# Patient Record
Sex: MALE | Race: WHITE | NOT HISPANIC OR LATINO | Employment: OTHER | URBAN - METROPOLITAN AREA
[De-identification: names, ages, dates, MRNs, and addresses within clinical notes are randomized per-mention and may not be internally consistent; named-entity substitution may affect disease eponyms.]

---

## 2021-08-09 ENCOUNTER — HOSPITAL ENCOUNTER (EMERGENCY)
Facility: HOSPITAL | Age: 61
Discharge: HOME | End: 2021-08-09
Attending: EMERGENCY MEDICINE | Admitting: EMERGENCY MEDICINE

## 2021-08-09 ENCOUNTER — APPOINTMENT (OUTPATIENT)
Dept: GENERAL RADIOLOGY | Facility: HOSPITAL | Age: 61
End: 2021-08-09

## 2021-08-09 VITALS
OXYGEN SATURATION: 95 % | TEMPERATURE: 97.7 F | WEIGHT: 164.9 LBS | SYSTOLIC BLOOD PRESSURE: 104 MMHG | HEIGHT: 69 IN | RESPIRATION RATE: 20 BRPM | BODY MASS INDEX: 24.42 KG/M2 | DIASTOLIC BLOOD PRESSURE: 77 MMHG | HEART RATE: 93 BPM

## 2021-08-09 DIAGNOSIS — E83.42 HYPOMAGNESEMIA: ICD-10-CM

## 2021-08-09 DIAGNOSIS — K70.10 ALCOHOLIC HEPATITIS WITHOUT ASCITES: Primary | ICD-10-CM

## 2021-08-09 LAB
ALBUMIN SERPL-MCNC: 3.9 G/DL (ref 3.5–5.2)
ALBUMIN/GLOB SERPL: 1.7 G/DL
ALP SERPL-CCNC: 182 U/L (ref 39–117)
ALT SERPL W P-5'-P-CCNC: 84 U/L (ref 1–41)
ANION GAP SERPL CALCULATED.3IONS-SCNC: 18.5 MMOL/L (ref 5–15)
AST SERPL-CCNC: 299 U/L (ref 1–40)
BILIRUB SERPL-MCNC: 1.4 MG/DL (ref 0–1.2)
BUN SERPL-MCNC: 13 MG/DL (ref 8–23)
BUN/CREAT SERPL: 26.5 (ref 7–25)
CALCIUM SPEC-SCNC: 8 MG/DL (ref 8.6–10.5)
CHLORIDE SERPL-SCNC: 96 MMOL/L (ref 98–107)
CK MB SERPL-CCNC: <1 NG/ML
CK SERPL-CCNC: 31 U/L (ref 20–200)
CO2 SERPL-SCNC: 20.5 MMOL/L (ref 22–29)
CREAT SERPL-MCNC: 0.49 MG/DL (ref 0.76–1.27)
DEPRECATED RDW RBC AUTO: 42.5 FL (ref 37–54)
ERYTHROCYTE [DISTWIDTH] IN BLOOD BY AUTOMATED COUNT: 12.9 % (ref 12.3–15.4)
GFR SERPL CREATININE-BSD FRML MDRD: >150 ML/MIN/1.73
GLOBULIN UR ELPH-MCNC: 2.3 GM/DL
GLUCOSE SERPL-MCNC: 95 MG/DL (ref 65–99)
HCT VFR BLD AUTO: 35.3 % (ref 37.5–51)
HGB BLD-MCNC: 13.2 G/DL (ref 13–17.7)
HOLD SPECIMEN: NORMAL
LARGE PLATELETS: NORMAL
LIPASE SERPL-CCNC: 88 U/L (ref 13–60)
MAGNESIUM SERPL-MCNC: 1.2 MG/DL (ref 1.6–2.4)
MCH RBC QN AUTO: 33.7 PG (ref 26.6–33)
MCHC RBC AUTO-ENTMCNC: 37.4 G/DL (ref 31.5–35.7)
MCV RBC AUTO: 90.1 FL (ref 79–97)
NRBC BLD AUTO-RTO: 0 /100 WBC (ref 0–0.2)
NT-PROBNP SERPL-MCNC: 51.1 PG/ML (ref 0–900)
PLATELET # BLD AUTO: 68 10*3/MM3 (ref 140–450)
PMV BLD AUTO: 11.7 FL (ref 6–12)
POTASSIUM SERPL-SCNC: 3.4 MMOL/L (ref 3.5–5.2)
PROT SERPL-MCNC: 6.2 G/DL (ref 6–8.5)
RBC # BLD AUTO: 3.92 10*6/MM3 (ref 4.14–5.8)
RBC MORPH BLD: NORMAL
SMALL PLATELETS BLD QL SMEAR: NORMAL
SODIUM SERPL-SCNC: 135 MMOL/L (ref 136–145)
TROPONIN I SERPL-MCNC: 0.01 NG/ML (ref 0–0.6)
TROPONIN I SERPL-MCNC: 0.02 NG/ML (ref 0–0.6)
WBC # BLD AUTO: 5.23 10*3/MM3 (ref 3.4–10.8)
WBC MORPH BLD: NORMAL
WHOLE BLOOD HOLD SPECIMEN: NORMAL

## 2021-08-09 PROCEDURE — 82550 ASSAY OF CK (CPK): CPT | Performed by: EMERGENCY MEDICINE

## 2021-08-09 PROCEDURE — 93005 ELECTROCARDIOGRAM TRACING: CPT | Performed by: EMERGENCY MEDICINE

## 2021-08-09 PROCEDURE — 82553 CREATINE MB FRACTION: CPT | Performed by: EMERGENCY MEDICINE

## 2021-08-09 PROCEDURE — 96375 TX/PRO/DX INJ NEW DRUG ADDON: CPT

## 2021-08-09 PROCEDURE — 83880 ASSAY OF NATRIURETIC PEPTIDE: CPT | Performed by: EMERGENCY MEDICINE

## 2021-08-09 PROCEDURE — 25010000002 MAGNESIUM SULFATE IN D5W 1G/100ML (PREMIX) 1-5 GM/100ML-% SOLUTION: Performed by: EMERGENCY MEDICINE

## 2021-08-09 PROCEDURE — 99284 EMERGENCY DEPT VISIT MOD MDM: CPT

## 2021-08-09 PROCEDURE — 93005 ELECTROCARDIOGRAM TRACING: CPT

## 2021-08-09 PROCEDURE — 96366 THER/PROPH/DIAG IV INF ADDON: CPT

## 2021-08-09 PROCEDURE — 84484 ASSAY OF TROPONIN QUANT: CPT

## 2021-08-09 PROCEDURE — 85025 COMPLETE CBC W/AUTO DIFF WBC: CPT | Performed by: EMERGENCY MEDICINE

## 2021-08-09 PROCEDURE — 83690 ASSAY OF LIPASE: CPT | Performed by: EMERGENCY MEDICINE

## 2021-08-09 PROCEDURE — 80053 COMPREHEN METABOLIC PANEL: CPT | Performed by: EMERGENCY MEDICINE

## 2021-08-09 PROCEDURE — 85007 BL SMEAR W/DIFF WBC COUNT: CPT | Performed by: EMERGENCY MEDICINE

## 2021-08-09 PROCEDURE — 83735 ASSAY OF MAGNESIUM: CPT | Performed by: EMERGENCY MEDICINE

## 2021-08-09 PROCEDURE — 71045 X-RAY EXAM CHEST 1 VIEW: CPT

## 2021-08-09 PROCEDURE — 96365 THER/PROPH/DIAG IV INF INIT: CPT

## 2021-08-09 RX ORDER — PANTOPRAZOLE SODIUM 40 MG/10ML
40 INJECTION, POWDER, LYOPHILIZED, FOR SOLUTION INTRAVENOUS ONCE
Status: COMPLETED | OUTPATIENT
Start: 2021-08-09 | End: 2021-08-09

## 2021-08-09 RX ORDER — MAGNESIUM OXIDE 400 MG/1
400 TABLET ORAL DAILY
Qty: 30 TABLET | Refills: 0 | Status: SHIPPED | OUTPATIENT
Start: 2021-08-09

## 2021-08-09 RX ORDER — MAGNESIUM SULFATE 1 G/100ML
INJECTION INTRAVENOUS
Status: DISCONTINUED
Start: 2021-08-09 | End: 2021-08-09 | Stop reason: HOSPADM

## 2021-08-09 RX ORDER — SODIUM CHLORIDE 0.9 % (FLUSH) 0.9 %
10 SYRINGE (ML) INJECTION AS NEEDED
Status: DISCONTINUED | OUTPATIENT
Start: 2021-08-09 | End: 2021-08-09 | Stop reason: HOSPADM

## 2021-08-09 RX ORDER — MULTIVITAMIN
1 CAPSULE ORAL DAILY
Qty: 30 CAPSULE | Refills: 0 | Status: SHIPPED | OUTPATIENT
Start: 2021-08-09 | End: 2022-08-09

## 2021-08-09 RX ORDER — POTASSIUM CHLORIDE 750 MG/1
40 CAPSULE, EXTENDED RELEASE ORAL ONCE
Status: COMPLETED | OUTPATIENT
Start: 2021-08-09 | End: 2021-08-09

## 2021-08-09 RX ORDER — MAGNESIUM SULFATE 1 G/100ML
1 INJECTION INTRAVENOUS
Status: COMPLETED | OUTPATIENT
Start: 2021-08-09 | End: 2021-08-09

## 2021-08-09 RX ORDER — ASPIRIN 81 MG/1
324 TABLET, CHEWABLE ORAL ONCE
Status: DISCONTINUED | OUTPATIENT
Start: 2021-08-09 | End: 2021-08-09 | Stop reason: HOSPADM

## 2021-08-09 RX ADMIN — POTASSIUM CHLORIDE 40 MEQ: 10 CAPSULE, COATED, EXTENDED RELEASE ORAL at 10:41

## 2021-08-09 RX ADMIN — MAGNESIUM SULFATE HEPTAHYDRATE 1 G: 1 INJECTION, SOLUTION INTRAVENOUS at 13:45

## 2021-08-09 RX ADMIN — MAGNESIUM SULFATE HEPTAHYDRATE 1 G: 1 INJECTION, SOLUTION INTRAVENOUS at 10:45

## 2021-08-09 RX ADMIN — PANTOPRAZOLE SODIUM 40 MG: 40 INJECTION, POWDER, FOR SOLUTION INTRAVENOUS at 10:43

## 2021-08-09 NOTE — ED PROVIDER NOTES
Subjective   Pain.  Chest.  Retrosternal.  Aching.  Mild.  Woke up feeling that way this morning.  Has had these issues on and off over the past several months.  Nothing seems to make better or worse.  Denies fevers or chills.  Denies cough.  Denies vomiting or diarrhea.  Denies abdominal pain.  Denies alcohol consumption today.          Review of Systems   All other systems reviewed and are negative.      History reviewed. No pertinent past medical history.    No Known Allergies    Past Surgical History:   Procedure Laterality Date   • ABDOMINAL SURGERY         History reviewed. No pertinent family history.    Social History     Socioeconomic History   • Marital status:      Spouse name: Not on file   • Number of children: Not on file   • Years of education: Not on file   • Highest education level: Not on file   Tobacco Use   • Smoking status: Current Every Day Smoker     Packs/day: 1.00   Vaping Use   • Vaping Use: Never used   Substance and Sexual Activity   • Alcohol use: Yes   • Drug use: Yes     Types: Marijuana   • Sexual activity: Defer           Objective   Physical Exam  Vitals and nursing note reviewed.   Constitutional:       Comments: Chronically ill-appearing   HENT:      Head: Normocephalic and atraumatic.   Eyes:      Comments: No scleral icterus   Neck:      Vascular: No JVD.   Cardiovascular:      Rate and Rhythm: Normal rate and regular rhythm.      Pulses:           Radial pulses are 2+ on the right side and 2+ on the left side.   Pulmonary:      Effort: Pulmonary effort is normal.      Breath sounds: Normal breath sounds.   Chest:      Chest wall: No tenderness.   Abdominal:      Palpations: Abdomen is soft.      Tenderness: There is abdominal tenderness.   Musculoskeletal:      Right lower leg: No tenderness.      Left lower leg: No tenderness.   Skin:     General: Skin is warm and dry.   Neurological:      Mental Status: He is alert and oriented to person, place, and time.          Procedures           ED Course                                           MDM    61-year-old male presents for evaluation of chest discomfort.  He is fairly vague and nondescript about his symptoms.  His work-up indicates a mild transaminitis with an AST predominance.  Given his overall appearance I would suspect a history of alcoholism though he is not forthcoming about this when asked.  For unknown reasons the patient removed his catheter from his left forearm resulting in some minor bleeding and a bandage was placed by myself on this area.  Will obtain screening work-up for cardiopulmonary causes of his chest discomfort however I suspect alcoholic hepatitis as a likely issue for him.  The patient is also hypokalemic and hypomagnesemic.  Replacement has been ordered in the emergency department.  Additionally the differential diagnosis includes acute coronary syndrome, pulmonary embolism, pancreatitis, and pneumonia among others.      EKG interpretation: Interpreted by myself.  Sinus tachycardia.  Rate 103.  Normal P waves and NV interval.  Normal QRS and axis.  Normal QTC.  ST segments are nonspecific.  No significant changes from old EKG.  Final diagnoses:   Alcoholic hepatitis without ascites   Hypomagnesemia       ED Disposition  ED Disposition     ED Disposition Condition Comment    Discharge Stable           [facility] EMERGENCY ROOM  [address] [place] [address] [address]  [facility] [id number] [phone]    As needed, If symptoms worsen    Sudhir Webber MD  [address] [facility] [id number]  [phone]    Call today  for hepatitis evaluation         Medication List      New Prescriptions    magnesium oxide 400 MG tablet  Commonly known as: MAG-OX  Take 1 tablet by mouth Daily.     multivitamin capsule  Take 1 capsule by mouth Daily.           Where to Get Your Medications      These medications were sent to [facility] - [address] [place] [address]     Hours: Mon-Fri 9:00AM-7:30PM Saturday 9:00AM-2:00PM Phone: [phone]   · magnesium oxide 400 MG tablet  · multivitamin capsule          Conrado Mazariegos DO  08/09/21 1442

## 2021-08-10 LAB — QT INTERVAL: 340 MS

## 2021-09-04 ENCOUNTER — APPOINTMENT (OUTPATIENT)
Dept: GENERAL RADIOLOGY | Facility: HOSPITAL | Age: 61
End: 2021-09-04

## 2021-09-04 ENCOUNTER — HOSPITAL ENCOUNTER (INPATIENT)
Facility: HOSPITAL | Age: 61
LOS: 11 days | Discharge: HOME | End: 2021-09-16
Attending: EMERGENCY MEDICINE | Admitting: INTERNAL MEDICINE

## 2021-09-04 DIAGNOSIS — E83.42 HYPOMAGNESEMIA: ICD-10-CM

## 2021-09-04 DIAGNOSIS — F10.29 ALCOHOL DEPENDENCE WITH UNSPECIFIED ALCOHOL-INDUCED DISORDER (HCC): ICD-10-CM

## 2021-09-04 DIAGNOSIS — J18.9 PNEUMONIA DUE TO INFECTIOUS ORGANISM, UNSPECIFIED LATERALITY, UNSPECIFIED PART OF LUNG: ICD-10-CM

## 2021-09-04 DIAGNOSIS — J96.01 ACUTE RESPIRATORY FAILURE WITH HYPOXIA (HCC): ICD-10-CM

## 2021-09-04 DIAGNOSIS — Z78.9 DECREASED ACTIVITIES OF DAILY LIVING (ADL): ICD-10-CM

## 2021-09-04 DIAGNOSIS — R26.2 DIFFICULTY IN WALKING: ICD-10-CM

## 2021-09-04 DIAGNOSIS — Z20.822 SUSPECTED COVID-19 VIRUS INFECTION: Primary | ICD-10-CM

## 2021-09-04 LAB
ALBUMIN SERPL-MCNC: 3.2 G/DL (ref 3.5–5.2)
ALBUMIN/GLOB SERPL: 1.2 G/DL
ALP SERPL-CCNC: 197 U/L (ref 39–117)
ALT SERPL W P-5'-P-CCNC: 51 U/L (ref 1–41)
ANION GAP SERPL CALCULATED.3IONS-SCNC: 16.2 MMOL/L (ref 5–15)
AST SERPL-CCNC: 180 U/L (ref 1–40)
BILIRUB SERPL-MCNC: 1.5 MG/DL (ref 0–1.2)
BUN SERPL-MCNC: 29 MG/DL (ref 8–23)
BUN/CREAT SERPL: 30.9 (ref 7–25)
CALCIUM SPEC-SCNC: 8.1 MG/DL (ref 8.6–10.5)
CHLORIDE SERPL-SCNC: 92 MMOL/L (ref 98–107)
CO2 SERPL-SCNC: 22.8 MMOL/L (ref 22–29)
CREAT SERPL-MCNC: 0.94 MG/DL (ref 0.76–1.27)
D-LACTATE SERPL-SCNC: 3.6 MMOL/L (ref 0.5–2)
DEPRECATED RDW RBC AUTO: 48.2 FL (ref 37–54)
ERYTHROCYTE [DISTWIDTH] IN BLOOD BY AUTOMATED COUNT: 14.3 % (ref 12.3–15.4)
ETHANOL BLD-MCNC: 30 MG/DL (ref 0–10)
ETHANOL UR QL: 0.03 %
GFR SERPL CREATININE-BSD FRML MDRD: 82 ML/MIN/1.73
GLOBULIN UR ELPH-MCNC: 2.7 GM/DL
GLUCOSE SERPL-MCNC: 147 MG/DL (ref 65–99)
HCT VFR BLD AUTO: 32.6 % (ref 37.5–51)
HGB BLD-MCNC: 11.9 G/DL (ref 13–17.7)
HOLD SPECIMEN: NORMAL
HOLD SPECIMEN: NORMAL
LYMPHOCYTES # BLD MANUAL: 0.44 10*3/MM3 (ref 0.7–3.1)
LYMPHOCYTES NFR BLD MANUAL: 10 % (ref 5–12)
LYMPHOCYTES NFR BLD MANUAL: 5 % (ref 19.6–45.3)
MAGNESIUM SERPL-MCNC: 0.7 MG/DL (ref 1.6–2.4)
MCH RBC QN AUTO: 33.7 PG (ref 26.6–33)
MCHC RBC AUTO-ENTMCNC: 36.5 G/DL (ref 31.5–35.7)
MCV RBC AUTO: 92.4 FL (ref 79–97)
MONOCYTES # BLD AUTO: 0.89 10*3/MM3 (ref 0.1–0.9)
NEUTROPHILS # BLD AUTO: 7.55 10*3/MM3 (ref 1.7–7)
NEUTROPHILS NFR BLD MANUAL: 77 % (ref 42.7–76)
NEUTS BAND NFR BLD MANUAL: 8 % (ref 0–5)
NRBC SPEC MANUAL: 1 /100 WBC (ref 0–0.2)
PLATELET # BLD AUTO: 68 10*3/MM3 (ref 140–450)
PMV BLD AUTO: 13.4 FL (ref 6–12)
POTASSIUM SERPL-SCNC: 3 MMOL/L (ref 3.5–5.2)
PROT SERPL-MCNC: 5.9 G/DL (ref 6–8.5)
RBC # BLD AUTO: 3.53 10*6/MM3 (ref 4.14–5.8)
RBC MORPH BLD: NORMAL
SCAN SLIDE: NORMAL
SMALL PLATELETS BLD QL SMEAR: ABNORMAL
SODIUM SERPL-SCNC: 131 MMOL/L (ref 136–145)
TROPONIN T SERPL-MCNC: <0.01 NG/ML (ref 0–0.03)
WBC # BLD AUTO: 8.88 10*3/MM3 (ref 3.4–10.8)
WBC MORPH BLD: NORMAL
WHOLE BLOOD HOLD SPECIMEN: NORMAL
WHOLE BLOOD HOLD SPECIMEN: NORMAL

## 2021-09-04 PROCEDURE — U0003 INFECTIOUS AGENT DETECTION BY NUCLEIC ACID (DNA OR RNA); SEVERE ACUTE RESPIRATORY SYNDROME CORONAVIRUS 2 (SARS-COV-2) (CORONAVIRUS DISEASE [COVID-19]), AMPLIFIED PROBE TECHNIQUE, MAKING USE OF HIGH THROUGHPUT TECHNOLOGIES AS DESCRIBED BY CMS-2020-01-R: HCPCS | Performed by: EMERGENCY MEDICINE

## 2021-09-04 PROCEDURE — 80053 COMPREHEN METABOLIC PANEL: CPT

## 2021-09-04 PROCEDURE — 87040 BLOOD CULTURE FOR BACTERIA: CPT

## 2021-09-04 PROCEDURE — 82077 ASSAY SPEC XCP UR&BREATH IA: CPT | Performed by: NURSE PRACTITIONER

## 2021-09-04 PROCEDURE — 93005 ELECTROCARDIOGRAM TRACING: CPT

## 2021-09-04 PROCEDURE — 99291 CRITICAL CARE FIRST HOUR: CPT

## 2021-09-04 PROCEDURE — 84484 ASSAY OF TROPONIN QUANT: CPT

## 2021-09-04 PROCEDURE — 85007 BL SMEAR W/DIFF WBC COUNT: CPT

## 2021-09-04 PROCEDURE — 85025 COMPLETE CBC W/AUTO DIFF WBC: CPT

## 2021-09-04 PROCEDURE — 93005 ELECTROCARDIOGRAM TRACING: CPT | Performed by: EMERGENCY MEDICINE

## 2021-09-04 PROCEDURE — 93010 ELECTROCARDIOGRAM REPORT: CPT | Performed by: INTERNAL MEDICINE

## 2021-09-04 PROCEDURE — 25010000002 MAGNESIUM SULFATE IN D5W 1G/100ML (PREMIX) 1-5 GM/100ML-% SOLUTION: Performed by: EMERGENCY MEDICINE

## 2021-09-04 PROCEDURE — 83735 ASSAY OF MAGNESIUM: CPT

## 2021-09-04 PROCEDURE — 83605 ASSAY OF LACTIC ACID: CPT | Performed by: EMERGENCY MEDICINE

## 2021-09-04 PROCEDURE — 71045 X-RAY EXAM CHEST 1 VIEW: CPT

## 2021-09-04 RX ORDER — MAGNESIUM SULFATE 1 G/100ML
1 INJECTION INTRAVENOUS
Status: COMPLETED | OUTPATIENT
Start: 2021-09-04 | End: 2021-09-05

## 2021-09-04 RX ORDER — SODIUM CHLORIDE 0.9 % (FLUSH) 0.9 %
10 SYRINGE (ML) INJECTION AS NEEDED
Status: DISCONTINUED | OUTPATIENT
Start: 2021-09-04 | End: 2021-09-16 | Stop reason: HOSPADM

## 2021-09-04 RX ADMIN — MAGNESIUM SULFATE 1 G: 1 INJECTION INTRAVENOUS at 23:20

## 2021-09-04 RX ADMIN — SODIUM CHLORIDE, POTASSIUM CHLORIDE, SODIUM LACTATE AND CALCIUM CHLORIDE 2253 ML: 600; 310; 30; 20 INJECTION, SOLUTION INTRAVENOUS at 23:48

## 2021-09-05 ENCOUNTER — APPOINTMENT (OUTPATIENT)
Dept: GENERAL RADIOLOGY | Facility: HOSPITAL | Age: 61
End: 2021-09-05

## 2021-09-05 ENCOUNTER — APPOINTMENT (OUTPATIENT)
Dept: CT IMAGING | Facility: HOSPITAL | Age: 61
End: 2021-09-05

## 2021-09-05 PROBLEM — Z20.822 SUSPECTED COVID-19 VIRUS INFECTION: Status: ACTIVE | Noted: 2021-09-05

## 2021-09-05 LAB
ALBUMIN SERPL-MCNC: 2.9 G/DL (ref 3.5–5.2)
ALBUMIN/GLOB SERPL: 0.9 G/DL
ALP SERPL-CCNC: 210 U/L (ref 39–117)
ALT SERPL W P-5'-P-CCNC: 51 U/L (ref 1–41)
ANION GAP SERPL CALCULATED.3IONS-SCNC: 10 MMOL/L (ref 5–15)
ARTERIAL PATENCY WRIST A: POSITIVE
AST SERPL-CCNC: 173 U/L (ref 1–40)
BASE EXCESS BLDA CALC-SCNC: -1.3 MMOL/L (ref -2–2)
BASOPHILS # BLD MANUAL: 0.13 10*3/MM3 (ref 0–0.2)
BASOPHILS NFR BLD AUTO: 1 % (ref 0–1.5)
BDY SITE: ABNORMAL
BILIRUB SERPL-MCNC: 1.8 MG/DL (ref 0–1.2)
BUN SERPL-MCNC: 25 MG/DL (ref 8–23)
BUN/CREAT SERPL: 28.7 (ref 7–25)
CALCIUM SPEC-SCNC: 8.3 MG/DL (ref 8.6–10.5)
CHLORIDE SERPL-SCNC: 100 MMOL/L (ref 98–107)
CO2 SERPL-SCNC: 14 MMOL/L (ref 22–29)
COHGB MFR BLD: 0.4 % (ref 0–1.5)
CREAT SERPL-MCNC: 0.87 MG/DL (ref 0.76–1.27)
D-LACTATE SERPL-SCNC: 2 MMOL/L (ref 0.5–2)
DEPRECATED RDW RBC AUTO: 51.5 FL (ref 37–54)
ERYTHROCYTE [DISTWIDTH] IN BLOOD BY AUTOMATED COUNT: 14.7 % (ref 12.3–15.4)
FHHB: 6.7 % (ref 0–5)
GFR SERPL CREATININE-BSD FRML MDRD: 89 ML/MIN/1.73
GLOBULIN UR ELPH-MCNC: 3.4 GM/DL
GLUCOSE BLDC GLUCOMTR-MCNC: 152 MG/DL (ref 70–99)
GLUCOSE SERPL-MCNC: 197 MG/DL (ref 65–99)
HCO3 BLDA-SCNC: 24.6 MMOL/L (ref 22–26)
HCT VFR BLD AUTO: 37 % (ref 37.5–51)
HGB BLD-MCNC: 12.9 G/DL (ref 13–17.7)
HGB BLDA-MCNC: 13.4 G/DL (ref 13.8–16.4)
INHALED O2 CONCENTRATION: 100 %
LYMPHOCYTES # BLD MANUAL: 0.89 10*3/MM3 (ref 0.7–3.1)
LYMPHOCYTES NFR BLD MANUAL: 5 % (ref 5–12)
LYMPHOCYTES NFR BLD MANUAL: 6 % (ref 19.6–45.3)
MACROCYTES BLD QL SMEAR: ABNORMAL
MAGNESIUM SERPL-MCNC: 1.6 MG/DL (ref 1.6–2.4)
MCH RBC QN AUTO: 33.4 PG (ref 26.6–33)
MCHC RBC AUTO-ENTMCNC: 34.9 G/DL (ref 31.5–35.7)
MCV RBC AUTO: 95.9 FL (ref 79–97)
METHGB BLD QL: 0.3 % (ref 0–1.5)
MODALITY: ABNORMAL
MONOCYTES # BLD AUTO: 0.64 10*3/MM3 (ref 0.1–0.9)
MRSA DNA SPEC QL NAA+PROBE: NORMAL
NEUTROPHILS # BLD AUTO: 11.1 10*3/MM3 (ref 1.7–7)
NEUTROPHILS NFR BLD MANUAL: 84 % (ref 42.7–76)
NEUTS BAND NFR BLD MANUAL: 3 % (ref 0–5)
NOTE: ABNORMAL
NRBC SPEC MANUAL: 1 /100 WBC (ref 0–0.2)
NT-PROBNP SERPL-MCNC: 3971 PG/ML (ref 0–900)
OXYHGB MFR BLDV: 92.6 % (ref 94–99)
PCO2 BLDA: 45.4 MM HG (ref 35–45)
PH BLDA: 7.35 PH UNITS (ref 7.35–7.45)
PHOSPHATE SERPL-MCNC: 3 MG/DL (ref 2.5–4.5)
PLATELET # BLD AUTO: 83 10*3/MM3 (ref 140–450)
PMV BLD AUTO: 14.2 FL (ref 6–12)
PO2 BLD: 73 MM[HG] (ref 0–500)
PO2 BLDA: 73.2 MM HG (ref 80–100)
POLYCHROMASIA BLD QL SMEAR: ABNORMAL
POTASSIUM SERPL-SCNC: 3.5 MMOL/L (ref 3.5–5.2)
PROCALCITONIN SERPL-MCNC: 1.37 NG/ML (ref 0–0.25)
PROT SERPL-MCNC: 6.3 G/DL (ref 6–8.5)
QT INTERVAL: 299 MS
RBC # BLD AUTO: 3.86 10*6/MM3 (ref 4.14–5.8)
SAO2 % BLDCOA: 93.3 % (ref 95–99)
SARS-COV-2 RNA RESP QL NAA+PROBE: DETECTED
SCAN SLIDE: NORMAL
SMALL PLATELETS BLD QL SMEAR: ABNORMAL
SODIUM SERPL-SCNC: 124 MMOL/L (ref 136–145)
VARIANT LYMPHS NFR BLD MANUAL: 1 % (ref 0–5)
WBC # BLD AUTO: 12.76 10*3/MM3 (ref 3.4–10.8)
WBC MORPH BLD: NORMAL

## 2021-09-05 PROCEDURE — 94799 UNLISTED PULMONARY SVC/PX: CPT

## 2021-09-05 PROCEDURE — 94660 CPAP INITIATION&MGMT: CPT

## 2021-09-05 PROCEDURE — 82805 BLOOD GASES W/O2 SATURATION: CPT | Performed by: INTERNAL MEDICINE

## 2021-09-05 PROCEDURE — 25010000002 DIAZEPAM PER 5 MG: Performed by: EMERGENCY MEDICINE

## 2021-09-05 PROCEDURE — 85025 COMPLETE CBC W/AUTO DIFF WBC: CPT | Performed by: PHYSICIAN ASSISTANT

## 2021-09-05 PROCEDURE — 99223 1ST HOSP IP/OBS HIGH 75: CPT | Performed by: INTERNAL MEDICINE

## 2021-09-05 PROCEDURE — 25010000002 THIAMINE PER 100 MG: Performed by: GENERAL PRACTICE

## 2021-09-05 PROCEDURE — 25010000003 AMPICILLIN-SULBACTAM PER 1.5 G: Performed by: EMERGENCY MEDICINE

## 2021-09-05 PROCEDURE — 84100 ASSAY OF PHOSPHORUS: CPT | Performed by: INTERNAL MEDICINE

## 2021-09-05 PROCEDURE — 25010000002 CEFEPIME PER 500 MG: Performed by: INTERNAL MEDICINE

## 2021-09-05 PROCEDURE — 25010000002 MAGNESIUM SULFATE IN D5W 1G/100ML (PREMIX) 1-5 GM/100ML-% SOLUTION: Performed by: PHYSICIAN ASSISTANT

## 2021-09-05 PROCEDURE — 85007 BL SMEAR W/DIFF WBC COUNT: CPT | Performed by: PHYSICIAN ASSISTANT

## 2021-09-05 PROCEDURE — 5A09457 ASSISTANCE WITH RESPIRATORY VENTILATION, 24-96 CONSECUTIVE HOURS, CONTINUOUS POSITIVE AIRWAY PRESSURE: ICD-10-PCS | Performed by: INTERNAL MEDICINE

## 2021-09-05 PROCEDURE — 25010000003 POTASSIUM CHLORIDE 10 MEQ/100ML SOLUTION: Performed by: PHYSICIAN ASSISTANT

## 2021-09-05 PROCEDURE — 25010000002 ENOXAPARIN PER 10 MG: Performed by: INTERNAL MEDICINE

## 2021-09-05 PROCEDURE — XW033E5 INTRODUCTION OF REMDESIVIR ANTI-INFECTIVE INTO PERIPHERAL VEIN, PERCUTANEOUS APPROACH, NEW TECHNOLOGY GROUP 5: ICD-10-PCS | Performed by: INTERNAL MEDICINE

## 2021-09-05 PROCEDURE — 83605 ASSAY OF LACTIC ACID: CPT | Performed by: EMERGENCY MEDICINE

## 2021-09-05 PROCEDURE — 36415 COLL VENOUS BLD VENIPUNCTURE: CPT | Performed by: PHYSICIAN ASSISTANT

## 2021-09-05 PROCEDURE — 83735 ASSAY OF MAGNESIUM: CPT | Performed by: INTERNAL MEDICINE

## 2021-09-05 PROCEDURE — 25010000002 VANCOMYCIN 5 G RECONSTITUTED SOLUTION: Performed by: INTERNAL MEDICINE

## 2021-09-05 PROCEDURE — 84145 PROCALCITONIN (PCT): CPT | Performed by: PHYSICIAN ASSISTANT

## 2021-09-05 PROCEDURE — 82375 ASSAY CARBOXYHB QUANT: CPT | Performed by: INTERNAL MEDICINE

## 2021-09-05 PROCEDURE — 83050 HGB METHEMOGLOBIN QUAN: CPT | Performed by: INTERNAL MEDICINE

## 2021-09-05 PROCEDURE — 87641 MR-STAPH DNA AMP PROBE: CPT | Performed by: PHYSICIAN ASSISTANT

## 2021-09-05 PROCEDURE — 83880 ASSAY OF NATRIURETIC PEPTIDE: CPT | Performed by: INTERNAL MEDICINE

## 2021-09-05 PROCEDURE — 25010000002 ONDANSETRON PER 1 MG: Performed by: GENERAL PRACTICE

## 2021-09-05 PROCEDURE — 25010000002 DEXAMETHASONE PER 1 MG: Performed by: INTERNAL MEDICINE

## 2021-09-05 PROCEDURE — 36600 WITHDRAWAL OF ARTERIAL BLOOD: CPT | Performed by: INTERNAL MEDICINE

## 2021-09-05 PROCEDURE — 80053 COMPREHEN METABOLIC PANEL: CPT | Performed by: INTERNAL MEDICINE

## 2021-09-05 PROCEDURE — 87040 BLOOD CULTURE FOR BACTERIA: CPT | Performed by: PHYSICIAN ASSISTANT

## 2021-09-05 PROCEDURE — 94640 AIRWAY INHALATION TREATMENT: CPT

## 2021-09-05 PROCEDURE — 25010000002 MAGNESIUM SULFATE IN D5W 1G/100ML (PREMIX) 1-5 GM/100ML-% SOLUTION: Performed by: EMERGENCY MEDICINE

## 2021-09-05 PROCEDURE — 82962 GLUCOSE BLOOD TEST: CPT

## 2021-09-05 PROCEDURE — 70450 CT HEAD/BRAIN W/O DYE: CPT

## 2021-09-05 PROCEDURE — 71045 X-RAY EXAM CHEST 1 VIEW: CPT

## 2021-09-05 PROCEDURE — 25010000002 LORAZEPAM PER 2 MG: Performed by: GENERAL PRACTICE

## 2021-09-05 PROCEDURE — 25010000002 MAGNESIUM SULFATE 2 GM/50ML SOLUTION: Performed by: INTERNAL MEDICINE

## 2021-09-05 RX ORDER — LORAZEPAM 2 MG/ML
2 INJECTION INTRAMUSCULAR
Status: DISPENSED | OUTPATIENT
Start: 2021-09-05 | End: 2021-09-12

## 2021-09-05 RX ORDER — METHION/INOS/CHOL BT/B COM/LIV 110MG-86MG
100 CAPSULE ORAL DAILY
Status: COMPLETED | OUTPATIENT
Start: 2021-09-06 | End: 2021-09-08

## 2021-09-05 RX ORDER — MAGNESIUM SULFATE HEPTAHYDRATE 40 MG/ML
2 INJECTION, SOLUTION INTRAVENOUS ONCE
Status: COMPLETED | OUTPATIENT
Start: 2021-09-05 | End: 2021-09-05

## 2021-09-05 RX ORDER — THIAMINE HYDROCHLORIDE 100 MG/ML
100 INJECTION, SOLUTION INTRAMUSCULAR; INTRAVENOUS ONCE
Status: COMPLETED | OUTPATIENT
Start: 2021-09-05 | End: 2021-09-05

## 2021-09-05 RX ORDER — FOLIC ACID 1 MG/1
1 TABLET ORAL DAILY
Status: COMPLETED | OUTPATIENT
Start: 2021-09-06 | End: 2021-09-08

## 2021-09-05 RX ORDER — LORAZEPAM 2 MG/ML
2 INJECTION INTRAMUSCULAR
Status: ACTIVE | OUTPATIENT
Start: 2021-09-05 | End: 2021-09-12

## 2021-09-05 RX ORDER — DIAZEPAM 5 MG/ML
5 INJECTION, SOLUTION INTRAMUSCULAR; INTRAVENOUS ONCE
Status: COMPLETED | OUTPATIENT
Start: 2021-09-05 | End: 2021-09-05

## 2021-09-05 RX ORDER — LORAZEPAM 2 MG/ML
1 INJECTION INTRAMUSCULAR EVERY 8 HOURS
Status: DISCONTINUED | OUTPATIENT
Start: 2021-09-06 | End: 2021-09-05

## 2021-09-05 RX ORDER — BUDESONIDE 0.5 MG/2ML
0.5 INHALANT ORAL
Status: DISCONTINUED | OUTPATIENT
Start: 2021-09-05 | End: 2021-09-16 | Stop reason: HOSPADM

## 2021-09-05 RX ORDER — ONDANSETRON 4 MG/1
4 TABLET, FILM COATED ORAL EVERY 6 HOURS PRN
Status: DISCONTINUED | OUTPATIENT
Start: 2021-09-05 | End: 2021-09-16 | Stop reason: HOSPADM

## 2021-09-05 RX ORDER — ALBUTEROL SULFATE 2.5 MG/3ML
2.5 SOLUTION RESPIRATORY (INHALATION) EVERY 4 HOURS PRN
Status: DISCONTINUED | OUTPATIENT
Start: 2021-09-05 | End: 2021-09-16 | Stop reason: HOSPADM

## 2021-09-05 RX ORDER — POTASSIUM CHLORIDE 7.45 MG/ML
10 INJECTION INTRAVENOUS
Status: COMPLETED | OUTPATIENT
Start: 2021-09-05 | End: 2021-09-05

## 2021-09-05 RX ORDER — LORAZEPAM 2 MG/ML
1 INJECTION INTRAMUSCULAR EVERY 6 HOURS
Status: DISCONTINUED | OUTPATIENT
Start: 2021-09-05 | End: 2021-09-05

## 2021-09-05 RX ORDER — DEXAMETHASONE SODIUM PHOSPHATE 10 MG/ML
10 INJECTION INTRAMUSCULAR; INTRAVENOUS 2 TIMES DAILY
Status: DISCONTINUED | OUTPATIENT
Start: 2021-09-05 | End: 2021-09-07

## 2021-09-05 RX ORDER — POTASSIUM CHLORIDE 750 MG/1
40 CAPSULE, EXTENDED RELEASE ORAL ONCE
Status: DISCONTINUED | OUTPATIENT
Start: 2021-09-05 | End: 2021-09-08

## 2021-09-05 RX ORDER — MULTIPLE VITAMINS W/ MINERALS TAB 9MG-400MCG
1 TAB ORAL DAILY
Status: DISPENSED | OUTPATIENT
Start: 2021-09-06 | End: 2021-09-09

## 2021-09-05 RX ORDER — LORAZEPAM 2 MG/ML
1 INJECTION INTRAMUSCULAR
Status: ACTIVE | OUTPATIENT
Start: 2021-09-05 | End: 2021-09-12

## 2021-09-05 RX ORDER — ONDANSETRON 2 MG/ML
4 INJECTION INTRAMUSCULAR; INTRAVENOUS EVERY 6 HOURS PRN
Status: DISCONTINUED | OUTPATIENT
Start: 2021-09-05 | End: 2021-09-16 | Stop reason: HOSPADM

## 2021-09-05 RX ORDER — IPRATROPIUM BROMIDE AND ALBUTEROL SULFATE 2.5; .5 MG/3ML; MG/3ML
3 SOLUTION RESPIRATORY (INHALATION)
Status: DISCONTINUED | OUTPATIENT
Start: 2021-09-05 | End: 2021-09-08

## 2021-09-05 RX ORDER — MAGNESIUM SULFATE 1 G/100ML
1 INJECTION INTRAVENOUS
Status: COMPLETED | OUTPATIENT
Start: 2021-09-05 | End: 2021-09-05

## 2021-09-05 RX ORDER — POTASSIUM CHLORIDE 7.45 MG/ML
10 INJECTION INTRAVENOUS
Status: DISCONTINUED | OUTPATIENT
Start: 2021-09-05 | End: 2021-09-05 | Stop reason: SDUPTHER

## 2021-09-05 RX ORDER — LORAZEPAM 0.5 MG/1
1 TABLET ORAL
Status: DISPENSED | OUTPATIENT
Start: 2021-09-05 | End: 2021-09-12

## 2021-09-05 RX ORDER — ARFORMOTEROL TARTRATE 15 UG/2ML
15 SOLUTION RESPIRATORY (INHALATION)
Status: DISCONTINUED | OUTPATIENT
Start: 2021-09-05 | End: 2021-09-16 | Stop reason: HOSPADM

## 2021-09-05 RX ORDER — MAGNESIUM SULFATE HEPTAHYDRATE 40 MG/ML
4 INJECTION, SOLUTION INTRAVENOUS ONCE
Status: DISCONTINUED | OUTPATIENT
Start: 2021-09-05 | End: 2021-09-05

## 2021-09-05 RX ORDER — LORAZEPAM 0.5 MG/1
2 TABLET ORAL
Status: DISPENSED | OUTPATIENT
Start: 2021-09-05 | End: 2021-09-12

## 2021-09-05 RX ADMIN — IPRATROPIUM BROMIDE AND ALBUTEROL SULFATE 3 ML: .5; 2.5 SOLUTION RESPIRATORY (INHALATION) at 19:43

## 2021-09-05 RX ADMIN — BUDESONIDE 0.5 MG: 0.5 INHALANT ORAL at 09:26

## 2021-09-05 RX ADMIN — AMPICILLIN SODIUM AND SULBACTAM SODIUM 3 G: 2; 1 INJECTION, POWDER, FOR SOLUTION INTRAMUSCULAR; INTRAVENOUS at 02:43

## 2021-09-05 RX ADMIN — CEFEPIME HYDROCHLORIDE 2 G: 2 INJECTION, POWDER, FOR SOLUTION INTRAVENOUS at 18:08

## 2021-09-05 RX ADMIN — POTASSIUM CHLORIDE 10 MEQ: 7.46 INJECTION, SOLUTION INTRAVENOUS at 06:09

## 2021-09-05 RX ADMIN — MAGNESIUM SULFATE 1 G: 1 INJECTION INTRAVENOUS at 04:16

## 2021-09-05 RX ADMIN — VANCOMYCIN HYDROCHLORIDE 1500 MG: 5 INJECTION, POWDER, LYOPHILIZED, FOR SOLUTION INTRAVENOUS at 10:41

## 2021-09-05 RX ADMIN — POTASSIUM CHLORIDE 10 MEQ: 7.46 INJECTION, SOLUTION INTRAVENOUS at 08:45

## 2021-09-05 RX ADMIN — MAGNESIUM SULFATE 2 G: 2 INJECTION INTRAVENOUS at 13:11

## 2021-09-05 RX ADMIN — POTASSIUM CHLORIDE 10 MEQ: 7.46 INJECTION, SOLUTION INTRAVENOUS at 07:40

## 2021-09-05 RX ADMIN — LORAZEPAM 1 MG: 2 INJECTION INTRAMUSCULAR; INTRAVENOUS at 03:18

## 2021-09-05 RX ADMIN — IPRATROPIUM BROMIDE AND ALBUTEROL SULFATE 3 ML: .5; 2.5 SOLUTION RESPIRATORY (INHALATION) at 12:44

## 2021-09-05 RX ADMIN — MAGNESIUM SULFATE 1 G: 1 INJECTION INTRAVENOUS at 04:09

## 2021-09-05 RX ADMIN — BUDESONIDE 0.5 MG: 0.5 INHALANT ORAL at 19:43

## 2021-09-05 RX ADMIN — MAGNESIUM SULFATE 1 G: 1 INJECTION INTRAVENOUS at 00:18

## 2021-09-05 RX ADMIN — ENOXAPARIN SODIUM 40 MG: 40 INJECTION SUBCUTANEOUS at 10:41

## 2021-09-05 RX ADMIN — DEXAMETHASONE SODIUM PHOSPHATE 10 MG: 10 INJECTION INTRAMUSCULAR; INTRAVENOUS at 10:41

## 2021-09-05 RX ADMIN — ONDANSETRON 4 MG: 2 INJECTION INTRAMUSCULAR; INTRAVENOUS at 04:09

## 2021-09-05 RX ADMIN — IPRATROPIUM BROMIDE AND ALBUTEROL SULFATE 3 ML: .5; 2.5 SOLUTION RESPIRATORY (INHALATION) at 09:29

## 2021-09-05 RX ADMIN — ARFORMOTEROL TARTRATE 15 MCG: 15 SOLUTION RESPIRATORY (INHALATION) at 09:26

## 2021-09-05 RX ADMIN — ARFORMOTEROL TARTRATE 15 MCG: 15 SOLUTION RESPIRATORY (INHALATION) at 19:43

## 2021-09-05 RX ADMIN — DEXAMETHASONE SODIUM PHOSPHATE 10 MG: 10 INJECTION INTRAMUSCULAR; INTRAVENOUS at 21:41

## 2021-09-05 RX ADMIN — REMDESIVIR 200 MG: 100 INJECTION, POWDER, LYOPHILIZED, FOR SOLUTION INTRAVENOUS at 16:27

## 2021-09-05 RX ADMIN — DIAZEPAM 5 MG: 5 INJECTION, SOLUTION INTRAMUSCULAR; INTRAVENOUS at 00:14

## 2021-09-05 RX ADMIN — Medication 2 G: at 12:32

## 2021-09-05 RX ADMIN — POTASSIUM CHLORIDE 10 MEQ: 7.46 INJECTION, SOLUTION INTRAVENOUS at 05:12

## 2021-09-05 RX ADMIN — THIAMINE HYDROCHLORIDE 100 MG: 100 INJECTION, SOLUTION INTRAMUSCULAR; INTRAVENOUS at 03:22

## 2021-09-05 NOTE — PLAN OF CARE
Goal Outcome Evaluation:  Plan of Care Reviewed With: patient        Progress: no change  Outcome Summary: admitted for PNA, covid swab pending, mag and potassium replaced, VSS, will continue to monitor

## 2021-09-05 NOTE — H&P
[facility]   HOSPITALIST HISTORY AND PHYSICAL  Date: 2021   Patient Name: Conrad Guerrier  : 1960  MRN: [id number]  Primary Care Physician:  Provider, No Known  Date of admission: 2021    Subjective   Subjective     Chief Complaint: Altered mental status    HPI:    Conrad Guerrier is a 61 y.o. male with PMH of abuse who presents to the ED with altered mental status.  He is a poor historian.  States his brother called EMS because he was having trouble falls due to his neuropathy.  States he fell and hit his nose and had nosebleeding.  Denies loss of consciousness.  Patient drinks vodka and beer daily.  Patient has been coughing but unable to determine if this is different from his chronic cough secondary to his smoking.  He was requiring 2 L of oxygen in the emergency department to maintain oxygen saturations greater than 90%.  Chest x-ray obtained in the ED showed bilateral infiltrates consistent with multifocal pneumonia.  He states that he has had one of his Covid vaccinations.  He was swabbed for COVID-19 in the ED.  Additionally, he was found to have a magnesium of 0.7 and a potassium of 3.0.  He was given 2 g of magnesium in the ED along with Unasyn for suspected aspiration pneumonia.  Head CT was  obtained and did not show any acute findings.  The hospitalist service was contacted for admission.      Past Medical History:  History reviewed. No pertinent past medical history.    Past Surgical History:  Past Surgical History:   Procedure Laterality Date   • ABDOMINAL SURGERY         Family History:   History reviewed. No pertinent family history.      Social History:   Social History     Tobacco Use   • Smoking status: Current Every Day Smoker     Packs/day: 1.00   Vaping Use   • Vaping Use: Never used   Substance Use Topics   • Alcohol use: Yes   • Drug use: Yes     Types: Marijuana       Home Medications:  magnesium oxide and multivitamin    Allergies:  No Known Allergies    Review of  Systems  Constitutional: No fever, unintentional weight loss, malaise  HEENT: No vision changes, loss of vision, double vision, difficulty swallowing, painful swallowing, or tinnitus  Respiratory: No cough, shortness of breath, sputum production, or hemoptysis  Cardiovascular: No chest pain, palpitations, orthopnea, or paroxysmal nocturnal dyspnea  Gastrointestinal: No nausea, vomiting, diarrhea, hematochezia, bright red blood per rectum, dark tarry stools, bowel incontinence or constipation  Genitourinary: No dysuria, hematuria, bladder incontinence, frequency, nocturia, or hesitancy  Musculoskeletal: No arthritis, joint swelling, deformities or joint pain  Endocrine: No fatigue, heat or cold intolerance  Hematologic: No excessive bruising or bleeding  Psychiatric: No Anxiety or depression  Neurologic: No Confusion, numbness, or weakness  Skin: No rash or open wounds    Objective   Objective     Vitals:   Temp:  [99.1 °F (37.3 °C)-99.7 °F (37.6 °C)] 99.1 °F (37.3 °C)  Heart Rate:  [124-135] 129  Resp:  [22] 22  BP: ()/() 145/80    Physical Exam    Constitutional: Awake, alert, no acute distress   Eyes: Ecchymosis surrounding left eye pupils equal, sclerae anicteric, no conjunctival injection   HENT: NCAT, mucous membranes moist   Neck: Supple, no thyromegaly, no lymphadenopathy, trachea midline   Respiratory: Clear to auscultation bilaterally, nonlabored respirations    Cardiovascular: tachycardiac, no murmurs, rubs, or gallops, palpable pedal pulses bilaterally   Gastrointestinal:soft, nontender, nondistended   Musculoskeletal: No bilateral ankle edema, no clubbing or cyanosis to extremities   Psychiatric: Appropriate affect, cooperative   Neurologic: Oriented x 3, strength symmetric in all extremities,speech clear   Skin: No rashes           Assessment/Plan   Assessment / Plan     Assessment:  Multifocal pneumonia  Rule out COVID-19  Hypomagnesemia  Hypokalemia  Hypoxemia  Alcohol withdrawal  Alcohol  abuse  Mechanical fall      Plan:   -Admit patient to the hospitalist service  -Patient was swabbed for COVID-19 in the ED, will remain in enhanced airborne isolation  -Pending results of COVID-19 test will start dexamethasone/remdesivir  -Continue IV antibiotics azithromycin/ceftriaxone  -[facility] protocol  -Replace potassium and magnesium, continue to monitor replete as necessary   -Obtain procalcitonin, strep pneumo and Legionella urine antigens,sputum/blood cultures  -Continue supplemental oxygen to maintain oxygen saturations greater than 90%        DVT prophylaxis:  No DVT prophylaxis order currently exists.    CODE STATUS:         Admission Status:  I believe this patient meets inpatient status.    Electronically signed by ROMEO Alvarez, 09/05/21, 4:25 AM EDT.        Attending note:  61-year-old male PMH alcohol abuse who presents to the ED with altered mental status.  On arrival to the ED he was hypoxic requiring nasal cannula.  Concern for Covid on chest x-ray.  He was admitted for further care.  Found to be COVID-19+.  Had worsening hypoxia since admission, necessitating NIPPV.  Patient remains confused unable to answer questions appropriately.  He is admitted for further care.  Surgical, family, and social history were attempted to be obtained but cannot as the patient was acutely altered    Vital signs reviewed  Gen: Respiratory distress, BiPAP in place,   ENT: PERRL, EOMI, orbital ecchymosis noted  CV: Tachycardic but regular no MRG  Pulm: Rhonchorous breath sounds noted throughout, BiPAP in place, tachypneic  GI: Abd soft, NTND, +bs  Neuro: Moving all extremities spontaneously, CN II-XII grossly intact   Psych: A&O*0, unable to assess mood and affect  Skin: No lesions or rashes noted   Telemetry reviewed showing sinus tachycardia  Chest x-ray personally reviewed    A/P:  Acute hypoxic respiratory failure requiring NIPPV  COVID-19 pneumonia  Viral pneumonia  Sinus tachycardia  Alcohol  abuse  Hypomagnesemia  Hypokalemia  Alcohol withdrawal  Alcohol abuse  Mechanical fall    Admit to the hospital on telemetry for management of the above  Consult pulmonology, appreciate assistance  Have worsening hypoxia, will start BiPAP 18/6, wean O2 to keep sats greater than 90%  Pharmacy to dose remdesivir  Increase dexamethasone to 10 mg twice daily  Start broad-spectrum vancomycin and cefepime, wean based on cultures  Start scheduled breathing treatments, Pulmicort and Brovana twice daily, albuterol as needed  Lovenox for DVT prophylaxis  Replace magnesium and potassium  Continue thiamine, multivitamin, folic acid  Trend renal function and electrolytes with a.m. BMP  Trend Hgb and WBC with a.m. CBC    Discussed case with: ED physician, bedside RN, pulmonology

## 2021-09-05 NOTE — ED PROVIDER NOTES
"Time: 11:08 PM EDT  Arrived by: ambulance  Chief Complaint: altered mental status  History provided by: patient  History is limited by: N/A    History of Present Illness:  Patient is a 61 y.o. year old male that presents to the emergency department with altered mental status. Pt drank vodka and beer today. Pt's brother called EMS. Pt states he was \"feeling bad\" and hasn't eaten for a couple days. Pt has been dry heaving, but he denies vomiting. Pt has a chronic cough that is unchanged secondary to smoking history.     Pt got his first COVID-19 shot.      Altered Mental Status  Severity:  Mild  Most recent episode:  Today  Episode history:  Continuous  Timing:  Unable to specify  Progression:  Unable to specify  Chronicity:  New  Context: alcohol use    Associated symptoms: nausea    Associated symptoms: no fever, no rash, no seizures and no vomiting            Similar Symptoms Previously: no  Recently seen: yes      Patient Care Team  Primary Care Provider: Provider, No Known      Past Medical History:     No Known Allergies  History reviewed. No pertinent past medical history.  Past Surgical History:   Procedure Laterality Date   • ABDOMINAL SURGERY       History reviewed. No pertinent family history.    Home Medications:  Prior to Admission medications    Medication Sig Start Date End Date Taking? Authorizing Provider   magnesium oxide (MAG-OX) 400 MG tablet Take 1 tablet by mouth Daily. 8/9/21   Conrado Mazariegos DO   Multiple Vitamin (multivitamin) capsule Take 1 capsule by mouth Daily. 8/9/21 8/9/22  Conrado Mazariegos DO        Social History:   PT  reports that he has been smoking. He has been smoking about 1.00 pack per day. He does not have any smokeless tobacco history on file. He reports current alcohol use. He reports current drug use. Drug: Marijuana.    Record Review:  I have reviewed the patient's records in [employer].     Review of Systems  Review of Systems   Constitutional: Negative for chills and fever.   HENT: " "Negative for nosebleeds.    Eyes: Negative for redness.   Respiratory: Positive for cough (chronic) and shortness of breath.    Cardiovascular: Negative for chest pain.   Gastrointestinal: Positive for nausea. Negative for diarrhea and vomiting.   Genitourinary: Negative for dysuria and frequency.   Musculoskeletal: Negative for back pain and neck pain.   Skin: Negative for rash.   Neurological: Negative for seizures.        Physical Exam  /80 (BP Location: Right arm, Patient Position: Lying)   Pulse (!) 129   Temp 99.1 °F (37.3 °C) (Oral)   Resp 22   Ht 177.8 cm (70\")   Wt 75.1 kg (165 lb 9.1 oz)   SpO2 97%   BMI 23.76 kg/m²     Physical Exam  Vitals and nursing note reviewed.   Constitutional:       General: He is not in acute distress.     Appearance: Normal appearance. He is not toxic-appearing.      Comments: Frequent cough.    HENT:      Head: Normocephalic and atraumatic.      Nose: Nose normal.      Mouth/Throat:      Mouth: Mucous membranes are moist.   Eyes:      General: Lids are normal. No scleral icterus.     Conjunctiva/sclera: Conjunctivae normal.      Comments: Subacute periorbital ecchymosis to left eye.    Cardiovascular:      Rate and Rhythm: Regular rhythm. Tachycardia present.      Pulses: Normal pulses.      Heart sounds: Normal heart sounds. No murmur heard.     Pulmonary:      Effort: Pulmonary effort is normal. No respiratory distress.      Breath sounds: Normal air entry. Examination of the right-upper field reveals rales. Examination of the left-upper field reveals rales. Examination of the right-middle field reveals rales. Examination of the left-middle field reveals rales. Examination of the right-lower field reveals rales. Examination of the left-lower field reveals rales. Rales present.   Chest:      Chest wall: No tenderness.   Abdominal:      Palpations: Abdomen is soft.      Tenderness: There is no abdominal tenderness. There is no guarding or rebound. " "  Musculoskeletal:         General: No tenderness. Normal range of motion.      Cervical back: Normal range of motion and neck supple. No tenderness.      Right lower leg: No edema.      Left lower leg: No edema.   Skin:     General: Skin is warm and dry.      Findings: No rash.   Neurological:      General: No focal deficit present.      Mental Status: He is alert and oriented to person, place, and time.      Sensory: Sensation is intact. No sensory deficit.      Motor: Motor function is intact. No weakness.   Psychiatric:         Behavior: Behavior normal.                  ED Course  /80 (BP Location: Right arm, Patient Position: Lying)   Pulse (!) 129   Temp 99.1 °F (37.3 °C) (Oral)   Resp 22   Ht 177.8 cm (70\")   Wt 75.1 kg (165 lb 9.1 oz)   SpO2 97%   BMI 23.76 kg/m²   Results for orders placed or performed during the hospital encounter of 09/04/21   Comprehensive Metabolic Panel    Specimen: Blood   Result Value Ref Range    Glucose 147 (H) 65 - 99 mg/dL    BUN 29 (H) 8 - 23 mg/dL    Creatinine 0.94 0.76 - 1.27 mg/dL    Sodium 131 (L) 136 - 145 mmol/L    Potassium 3.0 (L) 3.5 - 5.2 mmol/L    Chloride 92 (L) 98 - 107 mmol/L    CO2 22.8 22.0 - 29.0 mmol/L    Calcium 8.1 (L) 8.6 - 10.5 mg/dL    Total Protein 5.9 (L) 6.0 - 8.5 g/dL    Albumin 3.20 (L) 3.50 - 5.20 g/dL    ALT (SGPT) 51 (H) 1 - 41 U/L    AST (SGOT) 180 (H) 1 - 40 U/L    Alkaline Phosphatase 197 (H) 39 - 117 U/L    Total Bilirubin 1.5 (H) 0.0 - 1.2 mg/dL    eGFR Non African Amer 82 >60 mL/min/1.73    Globulin 2.7 gm/dL    A/G Ratio 1.2 g/dL    BUN/Creatinine Ratio 30.9 (H) 7.0 - 25.0    Anion Gap 16.2 (H) 5.0 - 15.0 mmol/L   Magnesium    Specimen: Blood   Result Value Ref Range    Magnesium 0.7 (C) 1.6 - 2.4 mg/dL   Troponin    Specimen: Blood   Result Value Ref Range    Troponin T <0.010 0.000 - 0.030 ng/mL   CBC Auto Differential    Specimen: Blood   Result Value Ref Range    WBC 8.88 3.40 - 10.80 10*3/mm3    RBC 3.53 (L) 4.14 - 5.80 " 10*6/mm3    Hemoglobin 11.9 (L) 13.0 - 17.7 g/dL    Hematocrit 32.6 (L) 37.5 - 51.0 %    MCV 92.4 79.0 - 97.0 fL    MCH 33.7 (H) 26.6 - 33.0 pg    MCHC 36.5 (H) 31.5 - 35.7 g/dL    RDW 14.3 12.3 - 15.4 %    RDW-SD 48.2 37.0 - 54.0 fl    MPV 13.4 (H) 6.0 - 12.0 fL    Platelets 68 (L) 140 - 450 10*3/mm3   Ethanol    Specimen: Blood   Result Value Ref Range    Ethanol 30 (H) 0 - 10 mg/dL    Ethanol % 0.030 %   Lactic Acid, Plasma    Specimen: Blood   Result Value Ref Range    Lactate 3.6 (C) 0.5 - 2.0 mmol/L   Scan Slide    Specimen: Blood   Result Value Ref Range    Scan Slide     Manual Differential    Specimen: Blood   Result Value Ref Range    Neutrophil % 77.0 (H) 42.7 - 76.0 %    Lymphocyte % 5.0 (L) 19.6 - 45.3 %    Monocyte % 10.0 5.0 - 12.0 %    Bands %  8.0 (H) 0.0 - 5.0 %    Neutrophils Absolute 7.55 (H) 1.70 - 7.00 10*3/mm3    Lymphocytes Absolute 0.44 (L) 0.70 - 3.10 10*3/mm3    Monocytes Absolute 0.89 0.10 - 0.90 10*3/mm3    nRBC 1.0 (H) 0.0 - 0.2 /100 WBC    RBC Morphology Normal Normal    WBC Morphology Normal Normal    Platelet Estimate Decreased Normal   STAT Lactic Acid, Reflex    Specimen: Blood   Result Value Ref Range    Lactate 2.0 0.5 - 2.0 mmol/L   CBC Auto Differential    Specimen: Blood   Result Value Ref Range    WBC 12.76 (H) 3.40 - 10.80 10*3/mm3    RBC 3.86 (L) 4.14 - 5.80 10*6/mm3    Hemoglobin 12.9 (L) 13.0 - 17.7 g/dL    Hematocrit 37.0 (L) 37.5 - 51.0 %    MCV 95.9 79.0 - 97.0 fL    MCH 33.4 (H) 26.6 - 33.0 pg    MCHC 34.9 31.5 - 35.7 g/dL    RDW 14.7 12.3 - 15.4 %    RDW-SD 51.5 37.0 - 54.0 fl    MPV 14.2 (H) 6.0 - 12.0 fL    Platelets 83 (L) 140 - 450 10*3/mm3   Procalcitonin    Specimen: Blood   Result Value Ref Range    Procalcitonin 1.37 (H) 0.00 - 0.25 ng/mL   Scan Slide    Specimen: Blood   Result Value Ref Range    Scan Slide     Manual Differential    Specimen: Blood   Result Value Ref Range    Neutrophil % 84.0 (H) 42.7 - 76.0 %    Lymphocyte % 6.0 (L) 19.6 - 45.3 %     Monocyte % 5.0 5.0 - 12.0 %    Basophil % 1.0 0.0 - 1.5 %    Bands %  3.0 0.0 - 5.0 %    Atypical Lymphocyte % 1.0 0.0 - 5.0 %    Neutrophils Absolute 11.10 (H) 1.70 - 7.00 10*3/mm3    Lymphocytes Absolute 0.89 0.70 - 3.10 10*3/mm3    Monocytes Absolute 0.64 0.10 - 0.90 10*3/mm3    Basophils Absolute 0.13 0.00 - 0.20 10*3/mm3    nRBC 1.0 (H) 0.0 - 0.2 /100 WBC    Macrocytes Slight/1+ None Seen    Polychromasia Slight/1+ None Seen    WBC Morphology Normal Normal    Platelet Estimate Decreased Normal   ECG 12 Lead   Result Value Ref Range    QT Interval 299 ms   Green Top (Gel)   Result Value Ref Range    Extra Tube Hold for add-ons.    Lavender Top   Result Value Ref Range    Extra Tube hold for add-on    Gold Top - SST   Result Value Ref Range    Extra Tube Hold for add-ons.    Light Blue Top   Result Value Ref Range    Extra Tube hold for add-on      Medications   sodium chloride 0.9 % flush 10 mL (has no administration in time range)   LORazepam (ATIVAN) injection 1 mg (0 mg Intravenous Hold 9/5/21 0635)     Followed by   LORazepam (ATIVAN) injection 1 mg (has no administration in time range)   ondansetron (ZOFRAN) tablet 4 mg ( Oral Not Given:  See Alt 9/5/21 0409)     Or   ondansetron (ZOFRAN) injection 4 mg (4 mg Intravenous Given 9/5/21 0409)   thiamine (VITAMIN B-1) tablet 100 mg (100 mg Oral Not Given 9/5/21 0322)   thiamine (VITAMIN B-1) tablet 100 mg (has no administration in time range)     And   multivitamin with minerals 1 tablet (has no administration in time range)     And   folic acid (FOLVITE) tablet 1 mg (has no administration in time range)   cefTRIAXone (ROCEPHIN) 1 g/100 mL 0.9% NS (MBP) (has no administration in time range)     And   azithromycin (ZITHROMAX) 500 mg/250 mL NS (has no administration in time range)   potassium chloride (MICRO-K) CR capsule 40 mEq (40 mEq Oral Not Given 9/5/21 2017)   potassium chloride 10 mEq in 100 mL IVPB (10 mEq Intravenous New Bag 9/5/21 0428)   magnesium  sulfate in D5W 1g/100mL (PREMIX) (1 g Intravenous New Bag 9/5/21 0018)   lactated ringers bolus 2,253 mL (2,253 mL Intravenous New Bag 9/4/21 0528)   ampicillin-sulbactam 3 g/100 mL 0.9% NS (MBP) (3 g Intravenous New Bag 9/5/21 0243)   diazePAM (VALIUM) injection 5 mg (5 mg Intravenous Given 9/5/21 0014)   thiamine (B-1) injection 100 mg (100 mg Intravenous Given 9/5/21 0322)   magnesium sulfate in D5W 1g/100mL (PREMIX) (1 g Intravenous New Bag 9/5/21 0416)     CT Head Without Contrast    Result Date: 9/5/2021  Narrative: PROCEDURE: CT HEAD WO CONTRAST  COMPARISON: 12/20/2019.  INDICATIONS: BRUISING TO LEFT EYE AFTER FALL TODAY.  PROTOCOL:   Standard imaging protocol performed    RADIATION:   MA and/or KV was adjusted to minimize radiation dose.    TECHNIQUE: After obtaining the patient's consent, 218 CT images were obtained without non-ionic intravenous contrast material.  FINDINGS: Considerable motion artifact obscures detail, despite repeating several images.  There are acute bilateral nasal bone fractures.  These fractures are comminuted and displaced with overlying acute contusion and subcutaneous emphysema.  An acute displaced fracture involves the anterior bony nasal septum.  Acute maxillary (antral) fractures may be present.  Air-fluid levels are seen in the bilateral maxillary antra.  In the differential diagnosis would be acute sinusitis.  There are opacified bilateral ethmoid air cells.  Air-fluid levels and mucosal thickening involve the bilateral sphenoid paranasal sinuses.  An air-fluid level involves the left frontal paranasal sinus.  There is opacification of the left middle ear cleft including the left middle ear cavity in the left mastoid air cells, which may represent age-indeterminate congestive and/or inflammatory change.  No gross nonenhanced CT evidence of an acute fracture in this region.  There is an incidental 5 mm osteoma in the anterior left ethmoid air cells, seen previously, and  unchanged.  The optic globes appear intact.  No acute retrobulbar hemorrhage is seen.   Grossly, no acute intracranial hemorrhage is suggested.  Grossly, no acute infarct is seen.  No definite midline shift or acute intracranial herniation syndrome.  The ventricular size and configuration are probably within normal limits for the patient's age, similar to the prior study.  Mild diffuse prominence of the extra-axial spaces is seen and suggests central atrophy.  There may be mild chronic small vessel ischemia/infarction.  Arterial calcifications are present.  Acute left-sided periorbital contusion is suggested.   CONCLUSION:  1. The study is very limited due to patient motion artifact.  2. Grossly, no acute brain abnormality is appreciated.  No definite acute intracranial hemorrhage.  No definite acute infarct.  No definite acute skull fracture.  3. There are acute bilateral displaced nasal bone fractures.  There is an acute anterior nasal septal fracture.  4. Air-fluid levels involve the paranasal sinuses without a definite acute fracture appreciated. 5. There is acute left periorbital contusion.  6. Consider repeating this head CT study and performing a maxillofacial CT examination when the patient is able to remain motionless for the study.  Consider conscious sedation if clinically indicated.  7. Please see above comments for further detail.     FELICIA GHOSH JR, MD       Electronically Signed and Approved By: FELICIA GHOSH JR, MD on 9/05/2021 at 2:47             XR Chest 1 View    Result Date: 9/4/2021  Narrative: PROCEDURE: XR CHEST 1 VW  COMPARISON: [facility], , XR CHEST 1 VW, 8/09/2021, 8:44.  INDICATIONS: HYPOXIA.  FINDINGS: A single AP upright portable chest radiograph is provided for review.  New bilateral infiltrates are seen, greater on the left.  The findings may represent infectious multifocal pneumonia.  The patient is rotated to the left.  There is suspected chronic asymmetric  elevation of the right diaphragm.  Probably no cardiac enlargement is present.  The thoracic aorta is atherosclerotic and ectatic.  There is an old healed proximal left humeral fracture.  No pneumothorax or pneumomediastinum.  There are postoperative changes of the upper to mid left thorax, as before.  CONCLUSION: New bilateral infiltrates are seen.  The findings suggest infectious multifocal pneumonia.      FELICIA GHOSH JR, MD       Electronically Signed and Approved By: FELICIA GHOSH JR, MD on 9/04/2021 at 23:44             XR Chest 1 View    Result Date: 8/9/2021  Narrative: PROCEDURE: XR CHEST 1 VW  COMPARISON: [facility], , CHEST AP/PA 1 VIEW, 12/16/2019, 12:43.  INDICATIONS: CHEST PAIN  FINDINGS:  Heart size within normal limits.  Elevated right hemidiaphragm is similar.  Emphysema.  Postoperative change the left apex.  No dense consolidation, visible pleural fluid or pneumothorax.  CONCLUSION: No acute change from 12/16/2019       ALBA NUNEZ MD       Electronically Signed and Approved By: ALBA NUNEZ MD on 8/09/2021 at 8:53               Procedures/EKGs:  Procedures            Medical Decision Making:                     MDM   Sepsis criteria was met in the emergency department and the Sepsis protocol (including antibiotic administration) was initiated.      SIRS criteria considered:   1.  Temperature > 100.4 or <98.6    2.  Heart Rate > 90    3.  Respiratory Rate > 22    4.  WBC > 12K or <4K.             Severe Sepsis:     Respiratory: Mechanical Ventilation or Bipap  Hypotension: SBP > 90 or MAP < 65  Renal: Creatinine > 2  Metabolic: Lactic Acid > 2  Hematologic: Platelets < 100K or INR > 1.5  Hepatic: BILI  >  2  CNS: Sudden AMS     Septic Shock:     Severe Sepsis + Persistent hypotension or Lactic Acid > 4     Normal saline bolus, Antibiotics, and final disposition was based on these definitions.        Sepsis was recognized at 2337.    Antibiotics were ordered.       30 cc/kg bolus  was indicated.       Total Critical Care time of 35 minutes. Total critical care time documented does not include time spent on separately billed procedures for services of nurses or physician assistants. I personally saw and examined the patient. I have reviewed all diagnostic interpretations and treatment plans as written. I was present for the key portions of any procedures performed and the inclusive time noted in any critical care statement. Critical care time includes patient management by me, time spent at the patients bedside,  time to review lab and imaging results, discussing patient care, documentation in the medical record, and time spent with family or caregiver.    Final diagnoses:   Suspected COVID-19 virus infection   Pneumonia due to infectious organism, unspecified laterality, unspecified part of lung   Acute respiratory failure with hypoxia (CMS/HCC)   Alcohol dependence with unspecified alcohol-induced disorder (CMS/HCC)   Hypomagnesemia        Disposition:  ED Disposition     ED Disposition Condition Comment    Decision to Admit  Level of Care: Telemetry [5]   Diagnosis: Suspected COVID-19 virus infection [[id number]]   Admitting Physician: ANNABELLE MONROY [[id number]]   Attending Physician: ANNABELLE MONROY [[id number]]   Isolate for COVID?: Yes [1]   Certification: I Certify That Inpatient Hospital Services Are Medically Necessary For Greater Than 2 Midnights            Documentation assistance provided by Jadyn Hollingsworth acting as scribe for Connor Hernandez MD. Information recorded by the scribe was done at my direction and has been verified and validated by me.        Jadyn Hollingsworth  09/04/21 3561       Jadyn Hollingsworth  09/04/21 2331       Connor Hernandez MD  09/05/21 0828

## 2021-09-05 NOTE — PLAN OF CARE
Goal Outcome Evaluation:   Pt found tachypneic and hypoxic this morning. RRT was called. Was placed on bipap where O2 saturations became stable in the 90s. Still on bipap and very lethargic, responding only to physical stimulation. COVID swab resulted positive. Has not been pulling/trying to remove bipap. Currently no sitter at bedside.

## 2021-09-05 NOTE — CONSULTS
Pulmonary / Critical Care Consult Note      Patient Name: Conrad Guerrier  : 1960  MRN: [id number]  Primary Care Physician:  Provider, No Known  Referring Physician: Niyah Francis MD  Date of admission: 2021    Subjective   Subjective     Reason for Consult/ Chief Complaint: acute hypoxemic respiratory failure    HPI:  Conrad Guerrier is a 61 y.o. male with medical history notable for alcohol use, active tobacco use and neuropathy.  He presented to the ER via EMS after patient's brother called 911 due to AMS and fall.   Patient per ER report was a poor historian.  By ER documentation, patient fell at home and hit his nose and was with nose bleed afterwards.  Patient was also altered.  Patient's brother then called 911 for assistance.  When patient arrived to the ER chest x-ray was obtained showing bilateral infiltrates concerning for multifocal pneumonia.  He by report had had 1 of 2 Covid vaccinations.  He was swabbed for Covid-19 in the ER and was positive.  Lab work revealed a potassium of 3.0 and a magnesium of 0.7.  Patient also required O2 in the ER secondary to being hypoxic.  Patient was placed on 2 LNC to maintain SPO2 90% or greater.  Patient was admitted to the hospital secondary to altered mental status with multiple electrolyte abnormalities and recent fall as well as hypoxic respiratory failure for further evaluation and treatment.    This morning patient was found to be hypoxic on nasal cannula and was placed on BiPAP to maintain SPO2 greater than 90%.  Because of worsening in respiratory status critical care was consulted for further evaluation and treatment.  At this time evaluation patient was already on BiPAP, did grimace with response to pain with trap squeeze, otherwise was not verbally responsive.     Review of Systems  Unable to obtain from patient as he is with AMS and on BiPAP      Personal History     History reviewed. No pertinent past medical history.    Past Surgical History:    Procedure Laterality Date   • ABDOMINAL SURGERY         Family History: family history is not on file. Otherwise pertinent FHx was reviewed and not pertinent to current issue.    Social History:  reports that he has been smoking. He has been smoking about 1.00 pack per day. He does not have any smokeless tobacco history on file. He reports current alcohol use. He reports current drug use. Drug: Marijuana.    Home Medications:  magnesium oxide and multivitamin    Allergies:  No Known Allergies    Objective    Objective     Vitals:   Temp:  [99.1 °F (37.3 °C)-100.2 °F (37.9 °C)] 100.2 °F (37.9 °C)  Heart Rate:  [124-140] 127  Resp:  [22-42] 29  BP: ()/() 87/64  Flow (L/min):  [4-6] 5    Physical Exam:  Vital Signs Reviewed   WDWN, NAD, BiPAP mask in place, not verbally responding, slight grimace to trap squeeze   HEENT:  PERRL, EOMI.  OP, nares clear, MMM; BiPAP mask in place  Neck:  Supple, no JVD, no thyromegaly  Lymph: no axillary, cervical, supraclavicular lymphadenopathy noted bilaterally  Chest:  good aeration, scattered crackles throughout, tachpneic on BiPAP, tympanic to percussion bilaterally, improvement with work of breathing noted  CV: Tachycardic, no MGR, pulses 2+, equal.  Abd:  Soft, NT, ND, + BS, no HSM  EXT:  no clubbing, no cyanosis, no edema, no joint tenderness  Neuro:  A&Ox1, with slight grimace to pain, not verbally responsive at time of evaluation   Skin: Multiple ecchymosis on face, arms    Result Review    Result Review:  I have personally reviewed the results from the time of this admission to 9/5/2021 14:23 EDT and agree with these findings:  [x]  Laboratory  [x]  Microbiology  [x]  Radiology  []  EKG/Telemetry   []  Cardiology/Vascular   []  Pathology  []  Old records  []  Other:  Most notable findings include: Covid-19 detected  9/4 9/5: Chest x-ray obtained with multifocal bilateral infiltrates consistent with Covid-19 pneumonia  CT on admission limited secondary to  patient's motion but no acute abnormality noted    BNP: 3971      CBC: 12.7  Procal: 1.37      COVID LABS:  Results From Last 14 Days   Lab Units 09/05/21  0530 09/05/21  0136 09/04/21  2221 09/04/21  2214   LACTATE mmol/L  --  2.0 3.6*  --    PROCALCITONIN ng/mL 1.37*  --   --   --    TROPONIN T ng/mL  --   --   --  <0.010       Assessment/Plan   Assessment / Plan     Active Hospital Problems:  Active Hospital Problems    Diagnosis    • Suspected COVID-19 virus infection          Impression:  Acute hypoxemic respiratory failure  Covid-19 pneumonia  Altered mental status  Alcohol use with concern for alcohol withdrawal  Fall outside of hospital        Plan:  Obtain chest xray in a.m.  Continue BiPAP current settings  Titrate O2 to maintain SPO2 greater than 88%  Given AMS and needing BiPAP will need safety sitter in room  Continuous pulse oximeter in room  Continue Brovana, Pulmicort and DuoNeb  Continue Decadron 10 mg IV daily for 10 days  Continue Remdesivir daily for 5 days  Trend inflammatory markers  Bronchopulmonary hygiene protocol in place  Bronchodilator protocol in place  Once mental status has improved encourage incentive spirometry throughout the day  On empiric coverage with Cefepime and Vancomycin.  MRSA PCR negative.  D/C Vancomycin.   If cultures negative can d/c Cefepime.  Continue CIWA protocol  Trend renal function and electrolytes.  Replaced today by primary      DVT prophylaxis:  Medical DVT prophylaxis orders are present.       Labs, radiology, microbiology and provider notes were personally reviewed  Patient's case was discussed with the primary service as well as the bedside RN  Thank you for allowing us to participate in the care of your patient, we will follow him closely with you      I, Sharonda Ferguson PA-C, am scribing for & in the presence of Dr. Dye on date 09/05/2021  Portions of this note were scribed in my presence today by my physician assistant,  DUNIA Ferguson PA-C, who was present on  rounds with me. I personally reviewed the entirety of the documentation & made changes where appropriate. The documentation, as is signed by myself, accurately reflects my involvement in the patients care today.       Part of this note may be an electronic transcription/translation of spoken language to printed  text using the Dragon Dictation System.       Electronically signed by Andres Dye MD, 9/5/2021, 14:23 EDT.

## 2021-09-06 ENCOUNTER — APPOINTMENT (OUTPATIENT)
Dept: GENERAL RADIOLOGY | Facility: HOSPITAL | Age: 61
End: 2021-09-06

## 2021-09-06 LAB
ALBUMIN SERPL-MCNC: 3 G/DL (ref 3.5–5.2)
ALBUMIN/GLOB SERPL: 1.1 G/DL
ALP SERPL-CCNC: 180 U/L (ref 39–117)
ALT SERPL W P-5'-P-CCNC: 42 U/L (ref 1–41)
ANION GAP SERPL CALCULATED.3IONS-SCNC: 12.6 MMOL/L (ref 5–15)
ANION GAP SERPL CALCULATED.3IONS-SCNC: 16.3 MMOL/L (ref 5–15)
AST SERPL-CCNC: 139 U/L (ref 1–40)
BILIRUB SERPL-MCNC: 1.3 MG/DL (ref 0–1.2)
BUN SERPL-MCNC: 29 MG/DL (ref 8–23)
BUN SERPL-MCNC: 30 MG/DL (ref 8–23)
BUN/CREAT SERPL: 35.8 (ref 7–25)
BUN/CREAT SERPL: 42.3 (ref 7–25)
CALCIUM SPEC-SCNC: 7.6 MG/DL (ref 8.6–10.5)
CALCIUM SPEC-SCNC: 8.1 MG/DL (ref 8.6–10.5)
CHLORIDE SERPL-SCNC: 95 MMOL/L (ref 98–107)
CHLORIDE SERPL-SCNC: 97 MMOL/L (ref 98–107)
CO2 SERPL-SCNC: 21.7 MMOL/L (ref 22–29)
CO2 SERPL-SCNC: 23.4 MMOL/L (ref 22–29)
CREAT SERPL-MCNC: 0.71 MG/DL (ref 0.76–1.27)
CREAT SERPL-MCNC: 0.81 MG/DL (ref 0.76–1.27)
CRP SERPL-MCNC: 16.5 MG/DL (ref 0–0.5)
D DIMER PPP FEU-MCNC: 1.36 MG/L (FEU) (ref 0–0.59)
DEPRECATED RDW RBC AUTO: 51.7 FL (ref 37–54)
EOSINOPHIL # BLD MANUAL: 0.37 10*3/MM3 (ref 0–0.4)
EOSINOPHIL NFR BLD MANUAL: 4 % (ref 0.3–6.2)
ERYTHROCYTE [DISTWIDTH] IN BLOOD BY AUTOMATED COUNT: 14.9 % (ref 12.3–15.4)
FERRITIN SERPL-MCNC: 6926 NG/ML (ref 30–400)
GFR SERPL CREATININE-BSD FRML MDRD: 113 ML/MIN/1.73
GFR SERPL CREATININE-BSD FRML MDRD: 97 ML/MIN/1.73
GLOBULIN UR ELPH-MCNC: 2.8 GM/DL
GLUCOSE SERPL-MCNC: 157 MG/DL (ref 65–99)
GLUCOSE SERPL-MCNC: 251 MG/DL (ref 65–99)
HBA1C MFR BLD: 4.8 % (ref 4.8–5.6)
HCT VFR BLD AUTO: 30.8 % (ref 37.5–51)
HGB BLD-MCNC: 11 G/DL (ref 13–17.7)
LYMPHOCYTES # BLD MANUAL: 1.01 10*3/MM3 (ref 0.7–3.1)
LYMPHOCYTES NFR BLD MANUAL: 11 % (ref 19.6–45.3)
LYMPHOCYTES NFR BLD MANUAL: 5 % (ref 5–12)
MACROCYTES BLD QL SMEAR: NORMAL
MAGNESIUM SERPL-MCNC: 1.8 MG/DL (ref 1.6–2.4)
MCH RBC QN AUTO: 33.7 PG (ref 26.6–33)
MCHC RBC AUTO-ENTMCNC: 35.7 G/DL (ref 31.5–35.7)
MCV RBC AUTO: 94.5 FL (ref 79–97)
MONOCYTES # BLD AUTO: 0.46 10*3/MM3 (ref 0.1–0.9)
NEUTROPHILS # BLD AUTO: 7.37 10*3/MM3 (ref 1.7–7)
NEUTROPHILS NFR BLD MANUAL: 62 % (ref 42.7–76)
NEUTS BAND NFR BLD MANUAL: 18 % (ref 0–5)
PHOSPHATE SERPL-MCNC: 1.5 MG/DL (ref 2.5–4.5)
PLAT MORPH BLD: NORMAL
PLATELET # BLD AUTO: 86 10*3/MM3 (ref 140–450)
PMV BLD AUTO: 14.5 FL (ref 6–12)
POTASSIUM SERPL-SCNC: 3.8 MMOL/L (ref 3.5–5.2)
POTASSIUM SERPL-SCNC: 3.9 MMOL/L (ref 3.5–5.2)
PROT SERPL-MCNC: 5.8 G/DL (ref 6–8.5)
RBC # BLD AUTO: 3.26 10*6/MM3 (ref 4.14–5.8)
RBC MORPH BLD: NORMAL
SCAN SLIDE: NORMAL
SMALL PLATELETS BLD QL SMEAR: NORMAL
SODIUM SERPL-SCNC: 131 MMOL/L (ref 136–145)
SODIUM SERPL-SCNC: 135 MMOL/L (ref 136–145)
VANCOMYCIN TROUGH SERPL-MCNC: 13.29 MCG/ML (ref 5–20)
WBC # BLD AUTO: 9.21 10*3/MM3 (ref 3.4–10.8)
WBC MORPH BLD: NORMAL

## 2021-09-06 PROCEDURE — 80202 ASSAY OF VANCOMYCIN: CPT | Performed by: INTERNAL MEDICINE

## 2021-09-06 PROCEDURE — 94799 UNLISTED PULMONARY SVC/PX: CPT

## 2021-09-06 PROCEDURE — 83735 ASSAY OF MAGNESIUM: CPT | Performed by: INTERNAL MEDICINE

## 2021-09-06 PROCEDURE — 99233 SBSQ HOSP IP/OBS HIGH 50: CPT | Performed by: INTERNAL MEDICINE

## 2021-09-06 PROCEDURE — 83036 HEMOGLOBIN GLYCOSYLATED A1C: CPT | Performed by: INTERNAL MEDICINE

## 2021-09-06 PROCEDURE — 85007 BL SMEAR W/DIFF WBC COUNT: CPT | Performed by: INTERNAL MEDICINE

## 2021-09-06 PROCEDURE — 25010000002 ENOXAPARIN PER 10 MG: Performed by: INTERNAL MEDICINE

## 2021-09-06 PROCEDURE — 85025 COMPLETE CBC W/AUTO DIFF WBC: CPT | Performed by: INTERNAL MEDICINE

## 2021-09-06 PROCEDURE — 25010000002 VANCOMYCIN 5 G RECONSTITUTED SOLUTION: Performed by: INTERNAL MEDICINE

## 2021-09-06 PROCEDURE — 80048 BASIC METABOLIC PNL TOTAL CA: CPT | Performed by: INTERNAL MEDICINE

## 2021-09-06 PROCEDURE — 25010000002 CEFEPIME PER 500 MG: Performed by: INTERNAL MEDICINE

## 2021-09-06 PROCEDURE — 82728 ASSAY OF FERRITIN: CPT | Performed by: PHYSICIAN ASSISTANT

## 2021-09-06 PROCEDURE — 86140 C-REACTIVE PROTEIN: CPT | Performed by: PHYSICIAN ASSISTANT

## 2021-09-06 PROCEDURE — 25010000002 DEXAMETHASONE PER 1 MG: Performed by: INTERNAL MEDICINE

## 2021-09-06 PROCEDURE — 71045 X-RAY EXAM CHEST 1 VIEW: CPT

## 2021-09-06 PROCEDURE — 25010000002 FUROSEMIDE PER 20 MG: Performed by: INTERNAL MEDICINE

## 2021-09-06 PROCEDURE — 85379 FIBRIN DEGRADATION QUANT: CPT | Performed by: PHYSICIAN ASSISTANT

## 2021-09-06 PROCEDURE — 84100 ASSAY OF PHOSPHORUS: CPT | Performed by: INTERNAL MEDICINE

## 2021-09-06 PROCEDURE — 94660 CPAP INITIATION&MGMT: CPT

## 2021-09-06 RX ORDER — FENTANYL/ROPIVACAINE/NS/PF 2-625MCG/1
15 PLASTIC BAG, INJECTION (ML) EPIDURAL
Status: COMPLETED | OUTPATIENT
Start: 2021-09-06 | End: 2021-09-06

## 2021-09-06 RX ORDER — FUROSEMIDE 10 MG/ML
40 INJECTION INTRAMUSCULAR; INTRAVENOUS ONCE
Status: COMPLETED | OUTPATIENT
Start: 2021-09-06 | End: 2021-09-06

## 2021-09-06 RX ADMIN — MULTIPLE VITAMINS W/ MINERALS TAB 1 TABLET: TAB at 09:37

## 2021-09-06 RX ADMIN — DEXAMETHASONE SODIUM PHOSPHATE 10 MG: 10 INJECTION INTRAMUSCULAR; INTRAVENOUS at 09:36

## 2021-09-06 RX ADMIN — POTASSIUM PHOSPHATE, MONOBASIC AND POTASSIUM PHOSPHATE, DIBASIC 15 MMOL: 224; 236 INJECTION, SOLUTION, CONCENTRATE INTRAVENOUS at 09:36

## 2021-09-06 RX ADMIN — IPRATROPIUM BROMIDE AND ALBUTEROL SULFATE 3 ML: .5; 2.5 SOLUTION RESPIRATORY (INHALATION) at 06:44

## 2021-09-06 RX ADMIN — CEFEPIME HYDROCHLORIDE 2 G: 2 INJECTION, POWDER, FOR SOLUTION INTRAVENOUS at 02:56

## 2021-09-06 RX ADMIN — REMDESIVIR 100 MG: 100 INJECTION, POWDER, LYOPHILIZED, FOR SOLUTION INTRAVENOUS at 14:02

## 2021-09-06 RX ADMIN — ARFORMOTEROL TARTRATE 15 MCG: 15 SOLUTION RESPIRATORY (INHALATION) at 20:03

## 2021-09-06 RX ADMIN — CEFEPIME HYDROCHLORIDE 2 G: 2 INJECTION, POWDER, FOR SOLUTION INTRAVENOUS at 15:29

## 2021-09-06 RX ADMIN — IPRATROPIUM BROMIDE AND ALBUTEROL SULFATE 3 ML: .5; 2.5 SOLUTION RESPIRATORY (INHALATION) at 14:14

## 2021-09-06 RX ADMIN — POTASSIUM PHOSPHATE, MONOBASIC AND POTASSIUM PHOSPHATE, DIBASIC 15 MMOL: 224; 236 INJECTION, SOLUTION, CONCENTRATE INTRAVENOUS at 12:20

## 2021-09-06 RX ADMIN — IPRATROPIUM BROMIDE AND ALBUTEROL SULFATE 3 ML: .5; 2.5 SOLUTION RESPIRATORY (INHALATION) at 01:19

## 2021-09-06 RX ADMIN — FUROSEMIDE 40 MG: 10 INJECTION, SOLUTION INTRAMUSCULAR; INTRAVENOUS at 09:36

## 2021-09-06 RX ADMIN — ARFORMOTEROL TARTRATE 15 MCG: 15 SOLUTION RESPIRATORY (INHALATION) at 06:44

## 2021-09-06 RX ADMIN — DEXAMETHASONE SODIUM PHOSPHATE 10 MG: 10 INJECTION INTRAMUSCULAR; INTRAVENOUS at 21:04

## 2021-09-06 RX ADMIN — FOLIC ACID 1 MG: 1 TABLET ORAL at 09:37

## 2021-09-06 RX ADMIN — VANCOMYCIN HYDROCHLORIDE 1000 MG: 5 INJECTION, POWDER, LYOPHILIZED, FOR SOLUTION INTRAVENOUS at 00:27

## 2021-09-06 RX ADMIN — Medication 100 MG: at 09:44

## 2021-09-06 RX ADMIN — IPRATROPIUM BROMIDE AND ALBUTEROL SULFATE 3 ML: .5; 2.5 SOLUTION RESPIRATORY (INHALATION) at 20:03

## 2021-09-06 RX ADMIN — MAGNESIUM OXIDE TAB 400 MG (241.3 MG ELEMENTAL MG) 400 MG: 400 (241.3 MG) TAB at 21:04

## 2021-09-06 RX ADMIN — BUDESONIDE 0.5 MG: 0.5 INHALANT ORAL at 06:44

## 2021-09-06 RX ADMIN — MAGNESIUM OXIDE TAB 400 MG (241.3 MG ELEMENTAL MG) 400 MG: 400 (241.3 MG) TAB at 09:37

## 2021-09-06 RX ADMIN — BUDESONIDE 0.5 MG: 0.5 INHALANT ORAL at 20:03

## 2021-09-06 RX ADMIN — ENOXAPARIN SODIUM 40 MG: 40 INJECTION SUBCUTANEOUS at 09:44

## 2021-09-06 NOTE — PROGRESS NOTES
"Pharmacy to Dose Vancomycin Day: 2    Conrad Guerrier is a 61 y.o.male admitted with pneumonia. Pharmacy has been consulted to dose IV Vancomycin     Consulting Provider: Maurice  Clinical Indication: PNA  Pertinent Past Medical History: *No pertinent past medical history per MD note  Goal -600 mg/L.hr  Duration of therapy: TBD    177.8 cm (70\")       09/04/21 2211      Weight: 75.1 kg (165 lb 9.1 oz)         Estimated Creatinine Clearance: 116.1 mL/min (A) (by C-G formula based on SCr of 0.71 mg/dL (L)).  Results from last 7 days  Lab  Units  09/06/21  0629  09/05/21  1454  09/05/21 0530 09/04/21  2214  BUN  mg/dL  30*  29*  25*  29*  CREATININE  mg/dL  0.71*  0.81  0.87  0.94      HD/PD/CRRT?: no    Lab Results   Component Value Date    WBC 9.21 09/06/2021      Temperature    09/05/21 2313 09/06/21 0246 09/06/21 0833   Temp: 97.9 °F (36.6 °C) 97.5 °F (36.4 °C) 98.1 °F (36.7 °C)        Contrast Administered: no    Relevant Micro:   Microbiology Results (last 10 days)       Procedure Component Value - Date/Time    MRSA Screen, PCR (Inpatient) - Swab, Nares [[id number]]  (Normal) Collected: 09/05/21 0608    Lab Status: Final result Specimen: Swab from Nares Updated: 09/05/21 0816     MRSA PCR No MRSA Detected    Blood Culture - Blood, Arm, Left [[id number]] Collected: 09/05/21 0530    Lab Status: Preliminary result Specimen: Blood from Arm, Left Updated: 09/06/21 0547     Blood Culture No growth at 24 hours    COVID-19,CEPHEID/CHIARA/BDMAX,COR/MARJORIE/PAD/MANUEL IN-HOUSE(OR EMERGENT/ADD-ON),NP SWAB IN TRANSPORT MEDIA 3-4 HR TAT, RT-PCR - Swab, Nasopharynx [[id number]]  (Abnormal) Collected: 09/04/21 2352    Lab Status: Final result Specimen: Swab from Nasopharynx Updated: 09/05/21 1449     COVID19 Detected    Narrative:      Fact sheet for providers: https://www.fda.gov/media/520532/download     Fact sheet for patients: https://www.fda.gov/media/997381/download  Fact sheet for providers: " https://www.fda.gov/media/219808/download     Fact sheet for patients: https://www.fda.gov/media/018327/download  Fact sheet for providers: https://www.fda.gov/media/043560/download     Fact sheet for patients: https://www.fda.gov/media/700707/download    Blood Culture - Blood, Arm, Left [[id number]] Collected: 09/04/21 2221    Lab Status: Preliminary result Specimen: Blood from Arm, Left Updated: 09/05/21 2301     Blood Culture No growth at 24 hours    Blood Culture - Blood, Arm, Left [[id number]] Collected: 09/04/21 2221    Lab Status: Preliminary result Specimen: Blood from Arm, Left Updated: 09/05/21 2301     Blood Culture No growth at 24 hours             Relevant Radiology:   9/4 CHEST XRAY: bilateral infiltrates consistent with multifocal pneumonia.    Other Antimicrobial Therapy: Cefepime    Assessment/Plan  Loading dose: vancomycin 1,500 mg IV on 9/5  Regimen: vancomycin 1,000 mg IV every 12 hours.    Exposure target: AUC24 (range)400-600 mg/L.hr     Note: MRSA PCR not detected, pt mildly febrile (tmax 99.7) w/ leukocytosis. COVID positive.      Vancomycin trough on 9/6 at 13.29 mcg/ml, correlates with appropriate AUC:HIRAM and trough at steady state.   Continue current therapy. Continue to follow cultures for deescalation.  Consider follow up level if therapy continued.

## 2021-09-06 NOTE — CONSULTS
"Nutrition Services    Patient Name: Conrad Guerrier  YOB: 1960  MRN: [id number]  Admission date: 9/4/2021      CLINICAL NUTRITION ASSESSMENT      Reason for Assessment  MST score 2+     H&P:    History reviewed. No pertinent past medical history.     Current Problems:   Active Hospital Problems    Diagnosis    • Suspected COVID-19 virus infection         Nutrition/Diet History         Narrative     Reduced oral intake reported x1 month.  Patient in Covid isolation, unable to contact.  75% of breakfast consumed.       Anthropometrics        Current Height, Weight Height: 177.8 cm (70\")  Weight: 75.1 kg (165 lb 9.1 oz)   Current BMI Body mass index is 23.76 kg/m².       Weight Hx  Wt Readings from Last 30 Encounters:   09/04/21 2211 75.1 kg (165 lb 9.1 oz)   08/09/21 0833 74.8 kg (164 lb 14.5 oz)            Wt Change Observation  weight change x1 month     Estimated/Assessed Needs       Energy Requirements    EST Needs (kcal/day) 2250       Protein Requirements    EST Daily Needs (g/day) 75-90g       Fluid Requirements     Estimated Needs (mL/day) 2250ml     Labs/Medications         Pertinent Labs Reviewed.   Results from last 7 days   Lab Units 09/06/21  0629 09/05/21  1454 09/05/21  0530 09/04/21  2214 09/04/21  2214   SODIUM mmol/L 131* 135* 124*   < > 131*   POTASSIUM mmol/L 3.8 3.9 3.5   < > 3.0*   CHLORIDE mmol/L 95* 97* 100   < > 92*   CO2 mmol/L 23.4 21.7* 14.0*   < > 22.8   BUN mg/dL 30* 29* 25*   < > 29*   CREATININE mg/dL 0.71* 0.81 0.87   < > 0.94   CALCIUM mg/dL 7.6* 8.1* 8.3*   < > 8.1*   BILIRUBIN mg/dL  --  1.3* 1.8*  --  1.5*   ALK PHOS U/L  --  180* 210*  --  197*   ALT (SGPT) U/L  --  42* 51*  --  51*   AST (SGOT) U/L  --  139* 173*  --  180*   GLUCOSE mg/dL 251* 157* 197*   < > 147*    < > = values in this interval not displayed.     Results from last 7 days   Lab Units 09/06/21  0629 09/05/21  0530 09/05/21  0530 09/04/21 2214 09/04/21 2214   MAGNESIUM mg/dL 1.8  --  1.6  --  0.7* "   PHOSPHORUS mg/dL 1.5*   < > 3.0  --   --    HEMOGLOBIN g/dL 11.0*   < > 12.9*   < > 11.9*   HEMATOCRIT % 30.8*   < > 37.0*   < > 32.6*    < > = values in this interval not displayed.     COVID19   Date Value Ref Range Status   09/04/2021 Detected (C) Not Detected - Ref. Range Final     No results found for: HGBA1C      Pertinent Medications Reviewed.     Current Nutrition Orders & Evaluation of Intake       Oral Nutrition     Current PO Diet Diet Regular   Supplement Orders Placed This Encounter      Dietary Nutrition Supplements Ensure Enlive       Nutrition Diagnosis         Nutrition Dx Problem 1 Increased nutrient needs related to increased nutrient needs due to catabolic disease/COVID-19 infection EtOH abuse/CIWA protocol as evidenced by patient report decreased appetite, electrolyte derangement.       Nutrition Intervention         Regular diet.  Ensure Enlive (20g protein 350kcal) 3 times daily.  Recommend phosphorus replacement.     Monitor/Evaluation        Monitor  p.o. intake, supplement intake, pertinent labs.       Nutrition Discharge Plan         To be determined       Electronically signed by:  Catherine Garcia RD  09/06/21 12:29 EDT

## 2021-09-06 NOTE — NURSING NOTE
Patient with bruising all over face and body.  Pt does not recall what happened.  Dry crusted abrasions to bilateral buttocks and sacrum.  Recommend use of calazime TID and off loading area.  Cb,rn,wcc

## 2021-09-06 NOTE — PROGRESS NOTES
[facility]   Hospitalist Progress Note  Date: 2021  Patient Name: Conrad Guerrier  : 1960  MRN: [id number]  Date of admission: 2021      Subjective   Subjective     Chief Complaint: Shortness of breath    Summary: 61 y.o.male with PMH neuropathy, alcohol abuse who presented to the ED with altered mental status.   He has been having falls due to his neuropathy. Patient drinks vodka and beer daily.  Patient has been coughing but unable to determine if this is different from his chronic cough secondary to his smoking.  He was requiring 2 L of oxygen in the emergency department to maintain oxygen saturations greater than 90%.  Chest x-ray obtained in the ED showed bilateral infiltrates consistent with multifocal pneumonia.  He states that he has had one of his Covid vaccinations.    He was admitted for further care.  Covid positive.  Had worsening hypoxia on the morning of  requiring NIPPV.  Pulmonology consulted.  Improved significantly and back down to nasal cannula by .  On Decadron, remdesivir, bronchodilators, cefepime due to concern for superimposed bacterial pneumonia or aspiration.    Interval Followup: No events overnight.  Patient's oxygenation has improved significantly and is now down to 3 L nasal cannula.  States he does not remember much from the last 2 days.  Does member falling and states it was due to his neuropathy.  He is unsure of why he has neuropathy, he has never been told.  States he does have some shortness of breath and coughing, but significantly improved.  Otherwise no new complaints.    Review of Systems   Denies nausea, vomiting, abdominal pain, headache    Objective   Objective     Vitals:   Temp:  [97.5 °F (36.4 °C)-98.8 °F (37.1 °C)] 97.7 °F (36.5 °C)  Heart Rate:  [] 70  Resp:  [20-30] 22  BP: (85-97)/(62-79) 88/66  Flow (L/min):  [5] 5  Physical Exam    Constitutional: Awake, alert, no acute distress   Eyes: Left periorbital ecchymosis, pupils equal,  sclerae anicteric, no conjunctival injection    HENT: NCAT, mucous membranes moist   Neck: Supple, no thyromegaly, no lymphadenopathy, trachea midline   Respiratory: Rhonchorous breath sounds in bilateral bases, otherwise clear, nonlabored respirations    Cardiovascular: RRR, no murmurs, rubs, or gallops, palpable pedal pulses bilaterally   Gastrointestinal: Positive bowel sounds, soft, nontender, nondistended   Musculoskeletal: No bilateral ankle edema, no clubbing or cyanosis to extremities   Psychiatric: Appropriate affect, cooperative   Neurologic: Oriented x 3, strength symmetric in all extremities, Cranial Nerves grossly intact to confrontation, speech clear   Skin: No rashes     Result Review    Result Review:  I have personally reviewed the results from the time of this admission to 9/6/2021 13:37 EDT and agree with these findings:  [x]  Laboratory  [x]  Microbiology  [x]  Radiology  [x]  EKG/Telemetry   []  Cardiology/Vascular   []  Pathology  []  Old records  []  Other:  Telemetry reviewed showing sinus tachycardia  Assessment/Plan   Assessment / Plan     Assessment/Plan:  Acute hypoxic respiratory failure requiring NIPPV  COVID-19 pneumonia  Viral pneumonia  Sinus tachycardia  Alcohol abuse  Hypomagnesemia  Hypokalemia  Alcohol withdrawal  Alcohol abuse  Mechanical fall     Continue to monitor on telemetry for management of the above  Pulmonology following, greatly appreciate assistance  Now weaned off BiPAP to nasal cannula, continue to wean oxygen as tolerated to keep sats greater 90%  Decrease dexamethasone to 10 mg daily, continue remdesivir  Monitor CMP daily while on remdesivir  MRSA nares negative, discontinue vancomycin  Continue cefepime for now, wean based on cultures  Continue scheduled breathing treatments, Pulmicort and Brovana twice daily, albuterol as needed  Lovenox for DVT prophylaxis  Continue thiamine, multivitamin, folic acid, CIWA protocol  Trend renal function and electrolytes with  a.m. BMP  Trend Hgb and WBC with a.m. CBC     Discussed case with: Bedside RN, pulmonology     DVT prophylaxis:  Medical DVT prophylaxis orders are present.    CODE STATUS:   Level Of Support Discussed With: Patient  Code Status: CPR  Medical Interventions (Level of Support Prior to Arrest): Full      Electronically signed by Tang Newsome MD, 09/06/21, 1:37 PM EDT.

## 2021-09-06 NOTE — PLAN OF CARE
Goal Outcome Evaluation:patient wore bipap throughout day and night- saturations are better, rr still high

## 2021-09-06 NOTE — PLAN OF CARE
Goal Outcome Evaluation:      Pt is alert and oriented. Pt maintained 98% oxygen level with Airvo. Pt is no longer incontinent.

## 2021-09-06 NOTE — PROGRESS NOTES
Pulmonary / Critical Care Progress Note      Patient Name: Cornad Guerrier  : 1960  MRN: [id number]  Attending:  Tang Richards MD  Date of admission: 2021    Subjective   Subjective   Follow-up for COVID-19    Over past 24 hours:  On 5 L of oxygen  Dyspnea improved  Diuresing well  Scant dry cough improved  No wheezing, chest pain or hemoptysis  No nausea, fevers or chills      Review of Systems  General: Denied complaints  Cardiovascular:  Denied complaints  Respiratory dyspnea, cough, otherwise: Denied complaints  Gastrointestinal: Denied complaints        Objective   Objective     Vitals:   Temp:  [97.5 °F (36.4 °C)-98.8 °F (37.1 °C)] 97.7 °F (36.5 °C)  Heart Rate:  [] 104  Resp:  [20-30] 20  BP: (85-97)/(62-79) 88/66  Flow (L/min):  [4-5] 5    Physical Exam   Vital Signs Reviewed   WDWN, Alert, NAD.    HEENT:  PERRL, EOMI.  OP, nares clear  Chest:  good aeration, crackles and rhonchi bilaterally, tympanic to percussion bilaterally, no work of breathing noted  CV: RRR, no MGR, pulses 2+, equal.  Abd:  Soft, NT, ND, + BS, no HSM  EXT:  no clubbing, no cyanosis, no edema  Neuro:  A&Ox3, CN grossly intact, no focal deficits.  Skin: No rashes or lesions noted      Result Review    Result Review:  I have personally reviewed the results from the time of this admission to 2021 15:33 EDT and agree with these findings:  [x]  Laboratory  [x]  Microbiology  [x]  Radiology  []  EKG/Telemetry   []  Cardiology/Vascular   []  Pathology  []  Old records  []  Other:      Assessment/Plan   Assessment / Plan     Active Hospital Problems:  Active Hospital Problems    Diagnosis    • Suspected COVID-19 virus infection          Impression:  Acute hypoxemic respiratory failure  Covid-19 pneumonia  Altered mental status  Toxic/metabolic encephalopathy  Therapeutic drug monitoring of remdesivir  Alcohol use with concern for alcohol withdrawal  Fall outside of hospital        Plan:  Ob wean O2 to keep SPO2 greater  than 90%   continue nebulizers and bronchopulmonary hygiene  Continue Decadron 10 mg IV daily for 10 days  Continue Remdesivir daily for 5 days.  Daily CMP for therapeutic drug monitoring  Trend inflammatory markers  Encourage prone position.  Otherwise, get patient out of bed and into chair  Continue CIWA protocol, multivitamin, thiamine, folic acid  Give Lasix 40 mg IV x1.  Monitor renal function and electrolytes    DVT prophylaxis:  Medical DVT prophylaxis orders are present.    CODE STATUS:   Level Of Support Discussed With: Patient  Code Status: CPR  Medical Interventions (Level of Support Prior to Arrest): Full      Labs, imaging, notes, microbiology and medications personally reviewed.  Discussed with primary    Electronically signed by Herbert Oliva MD, 09/06/21, 3:35 PM EDT.

## 2021-09-06 NOTE — PLAN OF CARE
Problem: Adult Inpatient Plan of Care  Goal: Plan of Care Review  Outcome: Ongoing, Progressing  Flowsheets (Taken 9/6/2021 1546)  Outcome Summary: pt recieved potassium phosphate and mutliple antibiotics today. no other significant changes. will continue to monitor patient.  Goal: Patient-Specific Goal (Individualized)  Outcome: Ongoing, Progressing  Goal: Absence of Hospital-Acquired Illness or Injury  Outcome: Ongoing, Progressing  Intervention: Identify and Manage Fall Risk  Recent Flowsheet Documentation  Taken 9/6/2021 1414 by Kim Gonzalez RN  Safety Promotion/Fall Prevention:   assistive device/personal items within reach   clutter free environment maintained   safety round/check completed   room organization consistent  Taken 9/6/2021 1220 by Kim Gonzalez RN  Safety Promotion/Fall Prevention:   assistive device/personal items within reach   clutter free environment maintained   safety round/check completed   room organization consistent  Taken 9/6/2021 0745 by Kim Gonzalez RN  Safety Promotion/Fall Prevention:   safety round/check completed   room organization consistent   assistive device/personal items within reach   clutter free environment maintained  Intervention: Prevent Infection  Recent Flowsheet Documentation  Taken 9/6/2021 1414 by Kim Gonzalez RN  Infection Prevention:   visitors restricted/screened   single patient room provided   rest/sleep promoted   environmental surveillance performed   equipment surfaces disinfected  Taken 9/6/2021 1220 by Kim Gonzalez RN  Infection Prevention:   visitors restricted/screened   single patient room provided   rest/sleep promoted   environmental surveillance performed   equipment surfaces disinfected  Taken 9/6/2021 0745 by Kim Gonzalez RN  Infection Prevention:   environmental surveillance performed   equipment surfaces disinfected   rest/sleep promoted   single patient room provided   visitors restricted/screened  Goal: Optimal Comfort and  Wellbeing  Outcome: Ongoing, Progressing  Intervention: Provide Person-Centered Care  Recent Flowsheet Documentation  Taken 9/6/2021 0745 by Kim Gonzalez RN  Trust Relationship/Rapport:   care explained   choices provided   emotional support provided   empathic listening provided   questions answered   questions encouraged   reassurance provided   thoughts/feelings acknowledged  Goal: Readiness for Transition of Care  Outcome: Ongoing, Progressing     Problem: Fluid Imbalance (Pneumonia)  Goal: Fluid Balance  Outcome: Ongoing, Progressing     Problem: Infection (Pneumonia)  Goal: Resolution of Infection Signs and Symptoms  Outcome: Ongoing, Progressing  Intervention: Prevent Infection Progression  Recent Flowsheet Documentation  Taken 9/6/2021 1414 by Kim Gonzalez RN  Isolation Precautions: airborne precautions maintained  Taken 9/6/2021 1220 by Kim Gonzalez RN  Isolation Precautions: airborne precautions maintained  Taken 9/6/2021 0745 by Kim Gonzalez RN  Isolation Precautions: airborne precautions maintained     Problem: Respiratory Compromise (Pneumonia)  Goal: Effective Oxygenation and Ventilation  Outcome: Ongoing, Progressing  Intervention: Promote Airway Secretion Clearance  Recent Flowsheet Documentation  Taken 9/6/2021 0745 by Kim Gonzalez RN  Cough And Deep Breathing: done independently per patient     Problem: Skin Injury Risk Increased  Goal: Skin Health and Integrity  Outcome: Ongoing, Progressing     Problem: Fall Injury Risk  Goal: Absence of Fall and Fall-Related Injury  Outcome: Ongoing, Progressing  Intervention: Identify and Manage Contributors to Fall Injury Risk  Recent Flowsheet Documentation  Taken 9/6/2021 1414 by Kim Gonzalez RN  Medication Review/Management: medications reviewed  Taken 9/6/2021 1220 by Kim Gonzalez RN  Medication Review/Management: medications reviewed  Taken 9/6/2021 0745 by Kim Gonzalez RN  Medication Review/Management: medications reviewed  Intervention: Promote  Injury-Free Environment  Recent Flowsheet Documentation  Taken 9/6/2021 1414 by Kim Gonzalez, RN  Safety Promotion/Fall Prevention:   assistive device/personal items within reach   clutter free environment maintained   safety round/check completed   room organization consistent  Taken 9/6/2021 1220 by Kim Gonzalez RN  Safety Promotion/Fall Prevention:   assistive device/personal items within reach   clutter free environment maintained   safety round/check completed   room organization consistent  Taken 9/6/2021 0745 by Kim Gonzalez RN  Safety Promotion/Fall Prevention:   safety round/check completed   room organization consistent   assistive device/personal items within reach   clutter free environment maintained     Problem: Noninvasive Ventilation Acute  Goal: Effective Unassisted Ventilation and Oxygenation  Outcome: Ongoing, Progressing   Goal Outcome Evaluation:              Outcome Summary: pt recieved potassium phosphate and mutliple antibiotics today. no other significant changes. will continue to monitor patient.

## 2021-09-07 LAB
ALBUMIN SERPL-MCNC: 2.5 G/DL (ref 3.5–5.2)
ALBUMIN/GLOB SERPL: 0.9 G/DL
ALP SERPL-CCNC: 231 U/L (ref 39–117)
ALT SERPL W P-5'-P-CCNC: 31 U/L (ref 1–41)
ANION GAP SERPL CALCULATED.3IONS-SCNC: 12.5 MMOL/L (ref 5–15)
AST SERPL-CCNC: 81 U/L (ref 1–40)
BASOPHILS # BLD AUTO: 0.02 10*3/MM3 (ref 0–0.2)
BASOPHILS NFR BLD AUTO: 0.2 % (ref 0–1.5)
BILIRUB SERPL-MCNC: 0.9 MG/DL (ref 0–1.2)
BUN SERPL-MCNC: 27 MG/DL (ref 8–23)
BUN/CREAT SERPL: 40.3 (ref 7–25)
CALCIUM SPEC-SCNC: 7.8 MG/DL (ref 8.6–10.5)
CHLORIDE SERPL-SCNC: 94 MMOL/L (ref 98–107)
CO2 SERPL-SCNC: 24.5 MMOL/L (ref 22–29)
CREAT SERPL-MCNC: 0.67 MG/DL (ref 0.76–1.27)
DEPRECATED RDW RBC AUTO: 49.1 FL (ref 37–54)
EOSINOPHIL # BLD AUTO: 0 10*3/MM3 (ref 0–0.4)
EOSINOPHIL NFR BLD AUTO: 0 % (ref 0.3–6.2)
ERYTHROCYTE [DISTWIDTH] IN BLOOD BY AUTOMATED COUNT: 14.7 % (ref 12.3–15.4)
GFR SERPL CREATININE-BSD FRML MDRD: 121 ML/MIN/1.73
GLOBULIN UR ELPH-MCNC: 2.9 GM/DL
GLUCOSE SERPL-MCNC: 231 MG/DL (ref 65–99)
HCT VFR BLD AUTO: 30 % (ref 37.5–51)
HGB BLD-MCNC: 11 G/DL (ref 13–17.7)
IMM GRANULOCYTES # BLD AUTO: 0.11 10*3/MM3 (ref 0–0.05)
IMM GRANULOCYTES NFR BLD AUTO: 1.1 % (ref 0–0.5)
LYMPHOCYTES # BLD AUTO: 0.74 10*3/MM3 (ref 0.7–3.1)
LYMPHOCYTES NFR BLD AUTO: 7.1 % (ref 19.6–45.3)
MAGNESIUM SERPL-MCNC: 1.4 MG/DL (ref 1.6–2.4)
MCH RBC QN AUTO: 33.5 PG (ref 26.6–33)
MCHC RBC AUTO-ENTMCNC: 36.7 G/DL (ref 31.5–35.7)
MCV RBC AUTO: 91.5 FL (ref 79–97)
MONOCYTES # BLD AUTO: 0.95 10*3/MM3 (ref 0.1–0.9)
MONOCYTES NFR BLD AUTO: 9.1 % (ref 5–12)
NEUTROPHILS NFR BLD AUTO: 8.62 10*3/MM3 (ref 1.7–7)
NEUTROPHILS NFR BLD AUTO: 82.5 % (ref 42.7–76)
NRBC BLD AUTO-RTO: 0.3 /100 WBC (ref 0–0.2)
PHOSPHATE SERPL-MCNC: 1.7 MG/DL (ref 2.5–4.5)
PLATELET # BLD AUTO: 114 10*3/MM3 (ref 140–450)
PMV BLD AUTO: ABNORMAL FL
POTASSIUM SERPL-SCNC: 3.7 MMOL/L (ref 3.5–5.2)
PROCALCITONIN SERPL-MCNC: 0.99 NG/ML (ref 0–0.25)
PROT SERPL-MCNC: 5.4 G/DL (ref 6–8.5)
RBC # BLD AUTO: 3.28 10*6/MM3 (ref 4.14–5.8)
RBC MORPH BLD: NORMAL
SMALL PLATELETS BLD QL SMEAR: NORMAL
SODIUM SERPL-SCNC: 131 MMOL/L (ref 136–145)
WBC # BLD AUTO: 10.44 10*3/MM3 (ref 3.4–10.8)
WBC MORPH BLD: NORMAL

## 2021-09-07 PROCEDURE — 94799 UNLISTED PULMONARY SVC/PX: CPT

## 2021-09-07 PROCEDURE — 25010000002 CEFEPIME PER 500 MG: Performed by: INTERNAL MEDICINE

## 2021-09-07 PROCEDURE — 85025 COMPLETE CBC W/AUTO DIFF WBC: CPT | Performed by: INTERNAL MEDICINE

## 2021-09-07 PROCEDURE — 25010000002 ENOXAPARIN PER 10 MG: Performed by: INTERNAL MEDICINE

## 2021-09-07 PROCEDURE — 84145 PROCALCITONIN (PCT): CPT | Performed by: INTERNAL MEDICINE

## 2021-09-07 PROCEDURE — 80053 COMPREHEN METABOLIC PANEL: CPT | Performed by: INTERNAL MEDICINE

## 2021-09-07 PROCEDURE — 94760 N-INVAS EAR/PLS OXIMETRY 1: CPT

## 2021-09-07 PROCEDURE — 84100 ASSAY OF PHOSPHORUS: CPT | Performed by: INTERNAL MEDICINE

## 2021-09-07 PROCEDURE — 99233 SBSQ HOSP IP/OBS HIGH 50: CPT | Performed by: INTERNAL MEDICINE

## 2021-09-07 PROCEDURE — 83735 ASSAY OF MAGNESIUM: CPT | Performed by: INTERNAL MEDICINE

## 2021-09-07 PROCEDURE — 85007 BL SMEAR W/DIFF WBC COUNT: CPT | Performed by: INTERNAL MEDICINE

## 2021-09-07 PROCEDURE — 25010000002 FUROSEMIDE PER 20 MG: Performed by: INTERNAL MEDICINE

## 2021-09-07 PROCEDURE — 25010000003 MAGNESIUM SULFATE 4 GM/100ML SOLUTION: Performed by: INTERNAL MEDICINE

## 2021-09-07 PROCEDURE — 25010000002 DEXAMETHASONE PER 1 MG: Performed by: INTERNAL MEDICINE

## 2021-09-07 RX ORDER — NICOTINE 21 MG/24HR
1 PATCH, TRANSDERMAL 24 HOURS TRANSDERMAL
Status: DISCONTINUED | OUTPATIENT
Start: 2021-09-07 | End: 2021-09-16 | Stop reason: HOSPADM

## 2021-09-07 RX ORDER — SODIUM PHOSPHATE IN D5W 15MMOL/250
15 PLASTIC BAG, INJECTION (ML) INTRAVENOUS EVERY 4 HOURS
Status: COMPLETED | OUTPATIENT
Start: 2021-09-07 | End: 2021-09-07

## 2021-09-07 RX ORDER — FUROSEMIDE 10 MG/ML
40 INJECTION INTRAMUSCULAR; INTRAVENOUS ONCE
Status: COMPLETED | OUTPATIENT
Start: 2021-09-07 | End: 2021-09-07

## 2021-09-07 RX ORDER — MAGNESIUM SULFATE HEPTAHYDRATE 40 MG/ML
4 INJECTION, SOLUTION INTRAVENOUS ONCE
Status: COMPLETED | OUTPATIENT
Start: 2021-09-07 | End: 2021-09-07

## 2021-09-07 RX ORDER — DEXAMETHASONE SODIUM PHOSPHATE 10 MG/ML
10 INJECTION INTRAMUSCULAR; INTRAVENOUS DAILY
Status: DISCONTINUED | OUTPATIENT
Start: 2021-09-08 | End: 2021-09-15

## 2021-09-07 RX ORDER — POLYETHYLENE GLYCOL 3350 17 G
2 POWDER IN PACKET (EA) ORAL
Status: DISCONTINUED | OUTPATIENT
Start: 2021-09-07 | End: 2021-09-16 | Stop reason: HOSPADM

## 2021-09-07 RX ADMIN — SODIUM PHOSPHATE, MONOBASIC, MONOHYDRATE 15 MMOL: 276; 142 INJECTION, SOLUTION INTRAVENOUS at 05:38

## 2021-09-07 RX ADMIN — BUDESONIDE 0.5 MG: 0.5 INHALANT ORAL at 19:36

## 2021-09-07 RX ADMIN — ENOXAPARIN SODIUM 40 MG: 40 INJECTION SUBCUTANEOUS at 08:30

## 2021-09-07 RX ADMIN — MAGNESIUM OXIDE TAB 400 MG (241.3 MG ELEMENTAL MG) 400 MG: 400 (241.3 MG) TAB at 20:45

## 2021-09-07 RX ADMIN — MULTIPLE VITAMINS W/ MINERALS TAB 1 TABLET: TAB at 08:31

## 2021-09-07 RX ADMIN — BUDESONIDE 0.5 MG: 0.5 INHALANT ORAL at 07:22

## 2021-09-07 RX ADMIN — IPRATROPIUM BROMIDE AND ALBUTEROL SULFATE 3 ML: .5; 2.5 SOLUTION RESPIRATORY (INHALATION) at 01:32

## 2021-09-07 RX ADMIN — IPRATROPIUM BROMIDE AND ALBUTEROL SULFATE 3 ML: .5; 2.5 SOLUTION RESPIRATORY (INHALATION) at 07:22

## 2021-09-07 RX ADMIN — ARFORMOTEROL TARTRATE 15 MCG: 15 SOLUTION RESPIRATORY (INHALATION) at 19:35

## 2021-09-07 RX ADMIN — LORAZEPAM 2 MG: 0.5 TABLET ORAL at 19:34

## 2021-09-07 RX ADMIN — REMDESIVIR 100 MG: 100 INJECTION, POWDER, LYOPHILIZED, FOR SOLUTION INTRAVENOUS at 15:33

## 2021-09-07 RX ADMIN — IPRATROPIUM BROMIDE AND ALBUTEROL SULFATE 3 ML: .5; 2.5 SOLUTION RESPIRATORY (INHALATION) at 19:36

## 2021-09-07 RX ADMIN — MAGNESIUM OXIDE TAB 400 MG (241.3 MG ELEMENTAL MG) 400 MG: 400 (241.3 MG) TAB at 08:31

## 2021-09-07 RX ADMIN — DEXAMETHASONE SODIUM PHOSPHATE 10 MG: 10 INJECTION INTRAMUSCULAR; INTRAVENOUS at 08:30

## 2021-09-07 RX ADMIN — LORAZEPAM 2 MG: 0.5 TABLET ORAL at 20:45

## 2021-09-07 RX ADMIN — ARFORMOTEROL TARTRATE 15 MCG: 15 SOLUTION RESPIRATORY (INHALATION) at 07:22

## 2021-09-07 RX ADMIN — FUROSEMIDE 40 MG: 10 INJECTION, SOLUTION INTRAMUSCULAR; INTRAVENOUS at 08:30

## 2021-09-07 RX ADMIN — Medication 100 MG: at 08:31

## 2021-09-07 RX ADMIN — CEFEPIME HYDROCHLORIDE 2 G: 2 INJECTION, POWDER, FOR SOLUTION INTRAVENOUS at 01:37

## 2021-09-07 RX ADMIN — IPRATROPIUM BROMIDE AND ALBUTEROL SULFATE 3 ML: .5; 2.5 SOLUTION RESPIRATORY (INHALATION) at 12:14

## 2021-09-07 RX ADMIN — MAGNESIUM SULFATE 4 G: 4 INJECTION INTRAVENOUS at 14:43

## 2021-09-07 RX ADMIN — FOLIC ACID 1 MG: 1 TABLET ORAL at 08:31

## 2021-09-07 RX ADMIN — SODIUM PHOSPHATE, MONOBASIC, MONOHYDRATE 15 MMOL: 276; 142 INJECTION, SOLUTION INTRAVENOUS at 10:09

## 2021-09-07 RX ADMIN — CEFEPIME HYDROCHLORIDE 2 G: 2 INJECTION, POWDER, FOR SOLUTION INTRAVENOUS at 17:54

## 2021-09-07 NOTE — PLAN OF CARE
Goal Outcome Evaluation:   Pt is tolerating the nasal cannula well. His oxygen levels stayed in the 90%. He is alert and oriented. Les Wolfe RN

## 2021-09-07 NOTE — PROGRESS NOTES
[facility]   Hospitalist Progress Note  Date: 2021  Patient Name: Conrad Guerrier  : 1960  MRN: [id number]  Date of admission: 2021  Consultants:   -Pulmonology/critical care: Dr. Herbert Oliva, Dr. Andres Dye    Subjective   Subjective     Chief Complaint: Shortness of breath    Summary:   Conrad Guerrier is a 61 y.o. male with past medical history significant for neuropathy, alcohol abuse who presented to ED with altered mental status.  Evaluation in the ED significant for patient requiring submental O2 to maintain O2 sats greater than 90%, chest x-ray obtained and demonstrated bilateral infiltrates consistent with multifocal pneumonia.  COVID-19 testing obtained and returned positive.  Patient had worsening hypoxia on morning of 2021 and required NIPPV.  Pulmonology consulted to assist in care.  Patient's respiratory status improved and patient's O2 requirement weaned as tolerated.  Treatment with Decadron, remdesivir, bronchodilators and cefepime continued during admission.    Interval Followup:   No acute events overnight.  Nursing notes that patient repeatedly becomes confused.  Oxygenation continues to improve and supplement O2 requirement weaned.  Patient denied any chest pain, abdominal pain, nausea or vomiting.  Endorse continued cough.  Nursing with no additional acute issues to report.    Antibiotics:   -Cefepime    Review of Systems   10 point review of systems performed and negative unless stated otherwise under subjective.    Objective   Objective     Vitals:   Temp:  [97.5 °F (36.4 °C)-98.1 °F (36.7 °C)] 97.5 °F (36.4 °C)  Heart Rate:  [] 110  Resp:  [18-22] 20  BP: ()/(69-80) 110/78  Flow (L/min):  [2-5] 2  Physical Exam   Gen: No acute distress, Conversant, Pleasant, sitting up in bed watching TV HEENT: MMM, Atraumatic  Neck: Supple, Trachea midline  Resp: Good aeration, CTAB, No w/r/r, No respiratory distress appreciated  Card: Regular rhythm, tachycardic, No  m/r/g  Abd: Soft, Nontender, Nondistended, + bowel sounds  Ext: No cyanosis, No clubbing  Neuro: CN II-XII grossly intact, No focal deficits appreciated  Psych: AAO x 2, Normal mood, Normal affect    Result Review    Result Review:  I have personally reviewed the results from the time of this admission to 9/7/2021 18:12 EDT and agree with these findings:  [x]  Laboratory: Electrolytes and creatinine stable and within normal limits.  WBC and hemoglobin stable.  []  Microbiology:   []  Radiology:   []  EKG/Telemetry:    []  Cardiology/Vascular:    []  Pathology:  []  Old records:  []  Other:    Assessment/Plan   Assessment / Plan     Assessment:  COVID-19 pneumonia  Acute hypoxic respiratory failure requiring NIPPV secondary to above  Viral pneumonia  Toxic/metabolic encephalopathy  Therapeutic drug level monitoring, remdesivir  Sinus tachycardia  Alcohol abuse  Hypomagnesemia  Hypokalemia  Alcohol withdrawal  Mechanical fall    Plan:  -Pulmonology/Critical Care consulted and following, appreciate assistance and recommendations in the care of this patient.  -Continue supplemental O2 to maintain sats greater than 90%, wean as tolerated  -Continue remdesivir with plan to treat for total 5 days  -Continue Decadron IV, plan to treat for total of 10 days  -Trend COVID-19 inflammatory markers  -Discussed care with pulmonology service, Procal downtrending.  Antibiotics will be discontinued.  -Patient to be given IV Lasix 40 mg x 1, strict I's and O's  -Continue nebulizers, bronchopulmonary hygiene protocol, incentive spirometer and flutter valve  -Continue CIWA protocol  -Continue thiamine, multivitamin and folic acid  -Replace magnesium  -Will monitor electrolytes, LFTs and renal function with CMP and magnesium level in the AM  -Will monitor WBC and Hgb with CBC in the AM  -Clinical course will dictate further management     DVT Prophylaxis: Lovenox  Diet: Regular  Dispo: Home when medically appropriate for discharge  Code  Status: Full code     Personally reviewed patients labs and imaging, discussed with patient and nurse at bedside. Discussed case with the following consultants: Pulmonology.     Part of this note may be an electronic transcription/translation of spoken language to printed text using the Dragon dictation system.    DVT prophylaxis:  Medical DVT prophylaxis orders are present.    CODE STATUS:   Level Of Support Discussed With: Patient  Code Status: CPR  Medical Interventions (Level of Support Prior to Arrest): Full        Electronically signed by Odell Curtis MD, 09/07/21, 6:12 PM EDT.

## 2021-09-07 NOTE — PROGRESS NOTES
Pulmonary / Critical Care Progress Note      Patient Name: Conrad Guerrier  : 1960  MRN: [id number]  Attending:  Odell Curtis MD  Date of admission: 2021    Subjective   Subjective   Follow-up for COVID-19    21--> COVID+, Currently receiving Remdesivir.     Over the last 24 hours, continues on Remdesivir, steroids, antibiotics, and nebulizers.     No acute events overnight.     This morning,  On room air with O2 sats 91%  Continues to desat with activity  Non-productive cough  Dyspnea improved  No wheezing  No chest pain  No fevers or chills  Wants to go home    Review of Systems  General: Denied complaints  Cardiovascular:  Denied complaints  Respiratory dyspnea, cough, otherwise: Denied complaints  Gastrointestinal: Denied complaints    Objective   Objective     Vitals:   Temp:  [97.5 °F (36.4 °C)-98.1 °F (36.7 °C)] 97.5 °F (36.4 °C)  Heart Rate:  [] 101  Resp:  [20-22] 22  BP: ()/(66-78) 96/69  Flow (L/min):  [4-5] 5    Physical Exam   Vital Signs Reviewed   General: WDWN male, Awake and Alert, NAD on room air    HEENT:  PERRL, EOMI.  OP, nares clear  Chest:  good aeration, lungs diminished bilaterally, tympanic to percussion bilaterally, no work of breathing noted  CV: RRR-, no MGR, pulses 2+, equal  Abd:  Soft, NT, ND, + BS, no HSM  EXT:  no clubbing, no cyanosis, no edema  Neuro:  A&Ox3, CN grossly intact, no focal deficits  Skin: No rashes or lesions noted    Result Review    Result Review:  I have personally reviewed the results from the time of this admission to 2021 07:32 EDT and agree with these findings:  [x]  Laboratory  [x]  Microbiology  [x]  Radiology  [x]  EKG/Telemetry   []  Cardiology/Vascular   []  Pathology  []  Old records  [x]  Other:    WBC 10.4, Cr 0.67, K+ 3.7, Mg+ 1.4    COVID LABS:  Results From Last 14 Days   Lab Units 21  0357 21  0630 21  0629 21  1454 21  0530 21  0136 21  2221 21  2214   PROBNP  pg/mL  --   --   --  3,971.0*  --   --   --   --    CRP mg/dL  --   --  16.50*  --   --   --   --   --    D DIMER QUANT mg/L (FEU)  --  1.36*  --   --   --   --   --   --    FERRITIN ng/mL  --   --  6,926.00*  --   --   --   --   --    LACTATE mmol/L  --   --   --   --   --  2.0 3.6*  --    PROCALCITONIN ng/mL 0.99*  --   --   --  1.37*  --   --   --    TROPONIN T ng/mL  --   --   --   --   --   --   --  <0.010       Assessment/Plan   Assessment / Plan     Active Hospital Problems:  Active Hospital Problems    Diagnosis    • Suspected COVID-19 virus infection      Impression:  Acute hypoxemic respiratory failure  Covid-19 pneumonia  Altered mental status  Toxic/metabolic encephalopathy  Therapeutic drug monitoring of remdesivir  Alcohol use with concern for alcohol withdrawal  Fall outside of hospital    Plan:  Wean O2 to keep SPO2 greater than 90%  Continue Remdesivir to complete 5 day course.  Continue daily CMP for therapeutic drug monitoring.  Continue to trend inflammatory markers.  Continue Decadron 10 mg IV daily for 10 days.  Procal trending down, 1.37-->0.99.    Discontinue antibiotics  Continue nebulizers and bronchopulmonary hygiene.  Continue IS and flutter valve.  Encourage to prone or lay side to side.  Otherwise, get patient out of bed and into chair.  Continue CIWA protocol, multivitamin, thiamine, folic acid.  Give Lasix 40 mg IV x1.  Monitor renal function and electrolytes.  Magnesium replaced per primary.       DVT prophylaxis:  Medical DVT prophylaxis orders are present.    CODE STATUS:   Level Of Support Discussed With: Patient  Code Status: CPR  Medical Interventions (Level of Support Prior to Arrest): Full    Labs, imaging, notes, microbiology and medications personally reviewed.  Discussed with primary and bedside RN.    Electronically signed by LESIA Gale, 09/07/21, 2:35 PM EDT.  Electronically signed by Herbert Oliva MD, 09/07/21, 5:01 PM EDT.

## 2021-09-07 NOTE — PLAN OF CARE
Problem: Adult Inpatient Plan of Care  Goal: Plan of Care Review  Outcome: Ongoing, Progressing  Flowsheets (Taken 9/7/2021 1341)  Outcome Summary: Potassium phosphate. Pulling of tele leads, and continuous pulse ox. MD aware. He is also disconnecting his own IV.  Goal: Patient-Specific Goal (Individualized)  Outcome: Ongoing, Progressing  Goal: Absence of Hospital-Acquired Illness or Injury  Outcome: Ongoing, Progressing  Goal: Optimal Comfort and Wellbeing  Outcome: Ongoing, Progressing  Goal: Readiness for Transition of Care  Outcome: Ongoing, Progressing     Problem: Fluid Imbalance (Pneumonia)  Goal: Fluid Balance  Outcome: Ongoing, Progressing     Problem: Infection (Pneumonia)  Goal: Resolution of Infection Signs and Symptoms  Outcome: Ongoing, Progressing     Problem: Respiratory Compromise (Pneumonia)  Goal: Effective Oxygenation and Ventilation  Outcome: Ongoing, Progressing     Problem: Skin Injury Risk Increased  Goal: Skin Health and Integrity  Outcome: Ongoing, Progressing     Problem: Fall Injury Risk  Goal: Absence of Fall and Fall-Related Injury  Outcome: Ongoing, Progressing     Problem: Noninvasive Ventilation Acute  Goal: Effective Unassisted Ventilation and Oxygenation  Outcome: Ongoing, Progressing   Goal Outcome Evaluation:              Outcome Summary: Potassium phosphate. Pulling of tele leads, and continuous pulse ox. MD aware. He is also disconnecting his own IV.

## 2021-09-08 ENCOUNTER — APPOINTMENT (OUTPATIENT)
Dept: GENERAL RADIOLOGY | Facility: HOSPITAL | Age: 61
End: 2021-09-08

## 2021-09-08 LAB
ALBUMIN SERPL-MCNC: 2.9 G/DL (ref 3.5–5.2)
ALBUMIN/GLOB SERPL: 1 G/DL
ALP SERPL-CCNC: 323 U/L (ref 39–117)
ALT SERPL W P-5'-P-CCNC: 38 U/L (ref 1–41)
ANION GAP SERPL CALCULATED.3IONS-SCNC: 12 MMOL/L (ref 5–15)
ARTERIAL PATENCY WRIST A: POSITIVE
AST SERPL-CCNC: 105 U/L (ref 1–40)
BASE EXCESS BLDA CALC-SCNC: 1.1 MMOL/L (ref -2–2)
BDY SITE: ABNORMAL
BILIRUB SERPL-MCNC: 0.9 MG/DL (ref 0–1.2)
BUN SERPL-MCNC: 18 MG/DL (ref 8–23)
BUN/CREAT SERPL: 32.7 (ref 7–25)
CALCIUM SPEC-SCNC: 8.2 MG/DL (ref 8.6–10.5)
CHLORIDE SERPL-SCNC: 94 MMOL/L (ref 98–107)
CO2 SERPL-SCNC: 27 MMOL/L (ref 22–29)
COHGB MFR BLD: 0.1 % (ref 0–1.5)
CREAT SERPL-MCNC: 0.55 MG/DL (ref 0.76–1.27)
CRP SERPL-MCNC: 6.02 MG/DL (ref 0–0.5)
D DIMER PPP FEU-MCNC: 1.8 MG/L (FEU) (ref 0–0.59)
DEPRECATED RDW RBC AUTO: 47 FL (ref 37–54)
ERYTHROCYTE [DISTWIDTH] IN BLOOD BY AUTOMATED COUNT: 14.7 % (ref 12.3–15.4)
FERRITIN SERPL-MCNC: 3391 NG/ML (ref 30–400)
FHHB: 4.9 % (ref 0–5)
GFR SERPL CREATININE-BSD FRML MDRD: >150 ML/MIN/1.73
GLOBULIN UR ELPH-MCNC: 2.9 GM/DL
GLUCOSE SERPL-MCNC: 140 MG/DL (ref 65–99)
HCO3 BLDA-SCNC: 28.1 MMOL/L (ref 22–26)
HCT VFR BLD AUTO: 33.2 % (ref 37.5–51)
HGB BLD-MCNC: 12.4 G/DL (ref 13–17.7)
HGB BLDA-MCNC: 14 G/DL (ref 13.8–16.4)
INHALED O2 CONCENTRATION: 60 %
LACTATE BLDA-SCNC: ABNORMAL MMOL/L
MAGNESIUM SERPL-MCNC: 1.7 MG/DL (ref 1.6–2.4)
MCH RBC QN AUTO: 33.2 PG (ref 26.6–33)
MCHC RBC AUTO-ENTMCNC: 37.3 G/DL (ref 31.5–35.7)
MCV RBC AUTO: 88.8 FL (ref 79–97)
METHGB BLD QL: 0.3 % (ref 0–1.5)
MODALITY: ABNORMAL
OXYHGB MFR BLDV: 94.7 % (ref 94–99)
PCO2 BLDA: 54.7 MM HG (ref 35–45)
PEEP RESPIRATORY: 5 CM[H2O]
PH BLDA: 7.33 PH UNITS (ref 7.35–7.45)
PHOSPHATE SERPL-MCNC: 1.4 MG/DL (ref 2.5–4.5)
PLATELET # BLD AUTO: 162 10*3/MM3 (ref 140–450)
PMV BLD AUTO: 14.2 FL (ref 6–12)
PO2 BLD: 151 MM[HG] (ref 0–500)
PO2 BLDA: 90.3 MM HG (ref 80–100)
POTASSIUM SERPL-SCNC: 3.5 MMOL/L (ref 3.5–5.2)
PROCALCITONIN SERPL-MCNC: 0.68 NG/ML (ref 0–0.25)
PROT SERPL-MCNC: 5.8 G/DL (ref 6–8.5)
QT INTERVAL: 302 MS
RBC # BLD AUTO: 3.74 10*6/MM3 (ref 4.14–5.8)
SAO2 % BLDCOA: 95.1 % (ref 95–99)
SET MECH RESP RATE: 22
SODIUM SERPL-SCNC: 133 MMOL/L (ref 136–145)
VENTILATOR MODE: AC
WBC # BLD AUTO: 15.98 10*3/MM3 (ref 3.4–10.8)

## 2021-09-08 PROCEDURE — 80053 COMPREHEN METABOLIC PANEL: CPT | Performed by: INTERNAL MEDICINE

## 2021-09-08 PROCEDURE — 25010000002 PROPOFOL 10 MG/ML EMULSION: Performed by: INTERNAL MEDICINE

## 2021-09-08 PROCEDURE — 02HV33Z INSERTION OF INFUSION DEVICE INTO SUPERIOR VENA CAVA, PERCUTANEOUS APPROACH: ICD-10-PCS | Performed by: INTERNAL MEDICINE

## 2021-09-08 PROCEDURE — 82375 ASSAY CARBOXYHB QUANT: CPT | Performed by: NURSE PRACTITIONER

## 2021-09-08 PROCEDURE — 94799 UNLISTED PULMONARY SVC/PX: CPT

## 2021-09-08 PROCEDURE — 99291 CRITICAL CARE FIRST HOUR: CPT | Performed by: INTERNAL MEDICINE

## 2021-09-08 PROCEDURE — 31500 INSERT EMERGENCY AIRWAY: CPT | Performed by: INTERNAL MEDICINE

## 2021-09-08 PROCEDURE — 36556 INSERT NON-TUNNEL CV CATH: CPT | Performed by: INTERNAL MEDICINE

## 2021-09-08 PROCEDURE — 25010000002 CEFEPIME PER 500 MG: Performed by: INTERNAL MEDICINE

## 2021-09-08 PROCEDURE — 99233 SBSQ HOSP IP/OBS HIGH 50: CPT | Performed by: INTERNAL MEDICINE

## 2021-09-08 PROCEDURE — 82728 ASSAY OF FERRITIN: CPT | Performed by: NURSE PRACTITIONER

## 2021-09-08 PROCEDURE — 86140 C-REACTIVE PROTEIN: CPT | Performed by: NURSE PRACTITIONER

## 2021-09-08 PROCEDURE — 94640 AIRWAY INHALATION TREATMENT: CPT

## 2021-09-08 PROCEDURE — 85025 COMPLETE CBC W/AUTO DIFF WBC: CPT

## 2021-09-08 PROCEDURE — 25010000002 VANCOMYCIN 5 G RECONSTITUTED SOLUTION: Performed by: INTERNAL MEDICINE

## 2021-09-08 PROCEDURE — 84145 PROCALCITONIN (PCT): CPT | Performed by: NURSE PRACTITIONER

## 2021-09-08 PROCEDURE — 83050 HGB METHEMOGLOBIN QUAN: CPT | Performed by: NURSE PRACTITIONER

## 2021-09-08 PROCEDURE — 85027 COMPLETE CBC AUTOMATED: CPT | Performed by: INTERNAL MEDICINE

## 2021-09-08 PROCEDURE — 83735 ASSAY OF MAGNESIUM: CPT

## 2021-09-08 PROCEDURE — 82805 BLOOD GASES W/O2 SATURATION: CPT | Performed by: NURSE PRACTITIONER

## 2021-09-08 PROCEDURE — 25010000002 FUROSEMIDE PER 20 MG: Performed by: INTERNAL MEDICINE

## 2021-09-08 PROCEDURE — 25010000002 DEXAMETHASONE PER 1 MG: Performed by: NURSE PRACTITIONER

## 2021-09-08 PROCEDURE — 84145 PROCALCITONIN (PCT): CPT | Performed by: INTERNAL MEDICINE

## 2021-09-08 PROCEDURE — 71045 X-RAY EXAM CHEST 1 VIEW: CPT

## 2021-09-08 PROCEDURE — 84100 ASSAY OF PHOSPHORUS: CPT

## 2021-09-08 PROCEDURE — 25010000003 MAGNESIUM SULFATE 4 GM/100ML SOLUTION: Performed by: INTERNAL MEDICINE

## 2021-09-08 PROCEDURE — 25010000002 LORAZEPAM PER 2 MG: Performed by: INTERNAL MEDICINE

## 2021-09-08 PROCEDURE — 87641 MR-STAPH DNA AMP PROBE: CPT | Performed by: INTERNAL MEDICINE

## 2021-09-08 PROCEDURE — 84100 ASSAY OF PHOSPHORUS: CPT | Performed by: INTERNAL MEDICINE

## 2021-09-08 PROCEDURE — 85379 FIBRIN DEGRADATION QUANT: CPT | Performed by: NURSE PRACTITIONER

## 2021-09-08 PROCEDURE — 0BH17EZ INSERTION OF ENDOTRACHEAL AIRWAY INTO TRACHEA, VIA NATURAL OR ARTIFICIAL OPENING: ICD-10-PCS | Performed by: INTERNAL MEDICINE

## 2021-09-08 PROCEDURE — 85007 BL SMEAR W/DIFF WBC COUNT: CPT

## 2021-09-08 PROCEDURE — 93005 ELECTROCARDIOGRAM TRACING: CPT | Performed by: INTERNAL MEDICINE

## 2021-09-08 PROCEDURE — 94002 VENT MGMT INPAT INIT DAY: CPT

## 2021-09-08 PROCEDURE — 83880 ASSAY OF NATRIURETIC PEPTIDE: CPT | Performed by: INTERNAL MEDICINE

## 2021-09-08 PROCEDURE — 93010 ELECTROCARDIOGRAM REPORT: CPT | Performed by: SPECIALIST

## 2021-09-08 PROCEDURE — 83735 ASSAY OF MAGNESIUM: CPT | Performed by: INTERNAL MEDICINE

## 2021-09-08 PROCEDURE — 5A1935Z RESPIRATORY VENTILATION, LESS THAN 24 CONSECUTIVE HOURS: ICD-10-PCS | Performed by: INTERNAL MEDICINE

## 2021-09-08 PROCEDURE — 25010000002 ENOXAPARIN PER 10 MG: Performed by: INTERNAL MEDICINE

## 2021-09-08 RX ORDER — MAGNESIUM SULFATE HEPTAHYDRATE 40 MG/ML
4 INJECTION, SOLUTION INTRAVENOUS ONCE
Status: COMPLETED | OUTPATIENT
Start: 2021-09-08 | End: 2021-09-08

## 2021-09-08 RX ORDER — FUROSEMIDE 10 MG/ML
40 INJECTION INTRAMUSCULAR; INTRAVENOUS ONCE
Status: COMPLETED | OUTPATIENT
Start: 2021-09-08 | End: 2021-09-08

## 2021-09-08 RX ORDER — NOREPINEPHRINE BIT/0.9 % NACL 16MG/250ML
.02-.3 INFUSION BOTTLE (ML) INTRAVENOUS
Status: DISCONTINUED | OUTPATIENT
Start: 2021-09-08 | End: 2021-09-11

## 2021-09-08 RX ORDER — FAMOTIDINE 10 MG/ML
20 INJECTION, SOLUTION INTRAVENOUS EVERY 12 HOURS SCHEDULED
Status: DISCONTINUED | OUTPATIENT
Start: 2021-09-08 | End: 2021-09-16 | Stop reason: HOSPADM

## 2021-09-08 RX ORDER — NOREPINEPHRINE BIT/0.9 % NACL 8 MG/250ML
.02-.3 INFUSION BOTTLE (ML) INTRAVENOUS
Status: DISCONTINUED | OUTPATIENT
Start: 2021-09-08 | End: 2021-09-08

## 2021-09-08 RX ORDER — METOPROLOL TARTRATE 5 MG/5ML
INJECTION INTRAVENOUS
Status: DISPENSED
Start: 2021-09-08 | End: 2021-09-08

## 2021-09-08 RX ORDER — FENTANYL/ROPIVACAINE/NS/PF 2-625MCG/1
15 PLASTIC BAG, INJECTION (ML) EPIDURAL
Status: COMPLETED | OUTPATIENT
Start: 2021-09-08 | End: 2021-09-08

## 2021-09-08 RX ORDER — IPRATROPIUM BROMIDE AND ALBUTEROL SULFATE 2.5; .5 MG/3ML; MG/3ML
3 SOLUTION RESPIRATORY (INHALATION) EVERY 4 HOURS PRN
Status: DISCONTINUED | OUTPATIENT
Start: 2021-09-08 | End: 2021-09-16 | Stop reason: HOSPADM

## 2021-09-08 RX ADMIN — LORAZEPAM 2 MG: 2 INJECTION INTRAMUSCULAR; INTRAVENOUS at 09:16

## 2021-09-08 RX ADMIN — DEXAMETHASONE SODIUM PHOSPHATE 10 MG: 10 INJECTION INTRAMUSCULAR; INTRAVENOUS at 09:16

## 2021-09-08 RX ADMIN — VANCOMYCIN HYDROCHLORIDE 1500 MG: 5 INJECTION, POWDER, LYOPHILIZED, FOR SOLUTION INTRAVENOUS at 16:43

## 2021-09-08 RX ADMIN — PROPOFOL 50 MCG/KG/MIN: 10 INJECTION, EMULSION INTRAVENOUS at 15:48

## 2021-09-08 RX ADMIN — ENOXAPARIN SODIUM 40 MG: 40 INJECTION SUBCUTANEOUS at 09:15

## 2021-09-08 RX ADMIN — FUROSEMIDE 40 MG: 10 INJECTION, SOLUTION INTRAMUSCULAR; INTRAVENOUS at 09:15

## 2021-09-08 RX ADMIN — MAGNESIUM OXIDE TAB 400 MG (241.3 MG ELEMENTAL MG) 400 MG: 400 (241.3 MG) TAB at 09:15

## 2021-09-08 RX ADMIN — REMDESIVIR 100 MG: 100 INJECTION, POWDER, LYOPHILIZED, FOR SOLUTION INTRAVENOUS at 21:26

## 2021-09-08 RX ADMIN — POTASSIUM PHOSPHATE, MONOBASIC AND POTASSIUM PHOSPHATE, DIBASIC 15 MMOL: 224; 236 INJECTION, SOLUTION, CONCENTRATE INTRAVENOUS at 09:17

## 2021-09-08 RX ADMIN — Medication 100 MG: at 09:14

## 2021-09-08 RX ADMIN — ARFORMOTEROL TARTRATE 15 MCG: 15 SOLUTION RESPIRATORY (INHALATION) at 19:43

## 2021-09-08 RX ADMIN — FAMOTIDINE 20 MG: 10 INJECTION INTRAVENOUS at 21:18

## 2021-09-08 RX ADMIN — MAGNESIUM SULFATE 4 G: 4 INJECTION INTRAVENOUS at 09:17

## 2021-09-08 RX ADMIN — LORAZEPAM 2 MG: 0.5 TABLET ORAL at 01:50

## 2021-09-08 RX ADMIN — LORAZEPAM 2 MG: 0.5 TABLET ORAL at 00:16

## 2021-09-08 RX ADMIN — SODIUM CHLORIDE 500 ML: 9 INJECTION, SOLUTION INTRAVENOUS at 12:19

## 2021-09-08 RX ADMIN — PROPOFOL 50 MCG/KG/MIN: 10 INJECTION, EMULSION INTRAVENOUS at 19:34

## 2021-09-08 RX ADMIN — POTASSIUM PHOSPHATE, MONOBASIC AND POTASSIUM PHOSPHATE, DIBASIC 15 MMOL: 224; 236 INJECTION, SOLUTION, CONCENTRATE INTRAVENOUS at 11:13

## 2021-09-08 RX ADMIN — IPRATROPIUM BROMIDE AND ALBUTEROL SULFATE 3 ML: .5; 2.5 SOLUTION RESPIRATORY (INHALATION) at 07:17

## 2021-09-08 RX ADMIN — CEFEPIME HYDROCHLORIDE 2 G: 2 INJECTION, POWDER, FOR SOLUTION INTRAVENOUS at 16:42

## 2021-09-08 RX ADMIN — FOLIC ACID 1 MG: 1 TABLET ORAL at 09:15

## 2021-09-08 RX ADMIN — ARFORMOTEROL TARTRATE 15 MCG: 15 SOLUTION RESPIRATORY (INHALATION) at 07:17

## 2021-09-08 RX ADMIN — BUDESONIDE 0.5 MG: 0.5 INHALANT ORAL at 07:17

## 2021-09-08 RX ADMIN — Medication 0.05 MCG/KG/MIN: at 13:57

## 2021-09-08 RX ADMIN — PROPOFOL 15 MCG/KG/MIN: 10 INJECTION, EMULSION INTRAVENOUS at 11:13

## 2021-09-08 RX ADMIN — BUDESONIDE 0.5 MG: 0.5 INHALANT ORAL at 19:42

## 2021-09-08 NOTE — PROCEDURES
"Intubation    Date/Time: 9/8/2021 2:52 PM  Performed by: Laz Uribe DO  Authorized by: Laz Uribe DO   Consent: The procedure was performed in an emergent situation.  Time out: Immediately prior to procedure a \"time out\" was called to verify the correct patient, procedure, equipment, support staff and site/side marked as required.  Indications: respiratory failure  Patient status: paralyzed (RSI)  Sedatives: etomidate  Paralytic: rocuronium  Tube type: cuffed  Number of attempts: 1  Cricoid pressure: no  Cords visualized: yes  Post-procedure assessment: chest rise and ETCO2 monitor  Breath sounds: equal  Cuff inflated: yes  ETT to lip: 24 cm  Tube secured with: ETT turpin  Chest x-ray interpreted by me.  Chest x-ray findings: endotracheal tube in appropriate position  Patient tolerance: patient tolerated the procedure well with no immediate complications        "

## 2021-09-08 NOTE — CONSULTS
Nutrition Services    Patient Name:  Conrad Guerrier  YOB: 1960  MRN: [id number]  Admit Date:  9/4/2021    Patient continues to have poor PO intake. 0-25%. Receiving Ensure Enlive TID. Phosphorus has been low since 9/6. Phosphorus remains very low: 1.4 today. Recommend phosphorus replacement. RD will continue to follow.     Electronically signed by:  Lashaun Escalera RD  09/08/21 07:45 EDT

## 2021-09-08 NOTE — PROCEDURES
"Insert Central Line At Bedside    Date/Time: 9/8/2021 3:36 PM  Performed by: Laz Uribe DO  Authorized by: Laz Uribe DO   Consent: The procedure was performed in an emergent situation.  Patient identity confirmed: arm band  Time out: Immediately prior to procedure a \"time out\" was called to verify the correct patient, procedure, equipment, support staff and site/side marked as required.  Indications: vascular access  Location details: left subclavian  Patient position: flat  Catheter type: triple lumen  Catheter size: 12 Fr  Pre-procedure: landmarks identified  Ultrasound guidance: no  Number of attempts: 1  Successful placement: yes  Post-procedure: line sutured and dressing applied  Assessment: blood return through all ports,  free fluid flow,  placement verified by x-ray and no pneumothorax on x-ray  Patient tolerance: patient tolerated the procedure well with no immediate complications        "

## 2021-09-08 NOTE — PROGRESS NOTES
Pulmonary / Critical Care Progress Note      Patient Name: Conrad Guerrier  : 1960  MRN: [id number]  Attending:  Odell Curtis MD  Date of admission: 2021    Subjective   Subjective   Follow-up for COVID-19    21--> COVID+, Currently receiving Remdesivir.     Over the last 24 hours, continues on Remdesivir, steroids, antibiotics, and nebulizers.     No acute events overnight.     This morning,  Was on 2 L of oxygen this morning and doing well  Continues to desat with activity desaturates with little activity non-productive cough  Dyspnea improved  No wheezing  No chest pain  No fevers or chills  Wants to go home    Review of Systems  General: Denied complaints  Cardiovascular:  Denied complaints  Respiratory dyspnea, cough, otherwise: Denied complaints  Gastrointestinal: Denied complaints    Objective   Objective     Vitals:   Temp:  [97.3 °F (36.3 °C)-98.4 °F (36.9 °C)] 98.4 °F (36.9 °C)  Heart Rate:  [] 129  Resp:  [18-28] 26  BP: ()/(67-85) 115/85  Flow (L/min):  [2-5] 5    Physical Exam   Vital Signs Reviewed   General: WDWN male, Awake and Alert, NAD on room air    HEENT:  PERRL, EOMI.  OP, nares clear  Chest:  good aeration, lungs diminished bilaterally, tympanic to percussion bilaterally, no work of breathing noted  CV: RRR-, no MGR, pulses 2+, equal  Abd:  Soft, NT, ND, + BS, no HSM  EXT:  no clubbing, no cyanosis, no edema  Neuro:  A&Ox3, CN grossly intact, no focal deficits  Skin: No rashes or lesions noted    Result Review    Result Review:  I have personally reviewed the results from the time of this admission to 2021 07:28 EDT and agree with these findings:  [x]  Laboratory  [x]  Microbiology  [x]  Radiology  [x]  EKG/Telemetry   []  Cardiology/Vascular   []  Pathology  []  Old records  [x]  Other:    WBC 10.4, Cr 0.67, K+ 3.7, Mg+ 1.4    COVID LABS:  Results From Last 14 Days   Lab Units 21  0417 21  0357 21  0630 21  0629 21  1454  09/05/21  0530 09/05/21  0136 09/04/21  2221 09/04/21  2214   PROBNP pg/mL  --   --   --   --  3,971.0*  --   --   --   --    CRP mg/dL 6.02*  --   --  16.50*  --   --   --   --   --    D DIMER QUANT mg/L (FEU) 1.80*  --  1.36*  --   --   --   --   --   --    FERRITIN ng/mL  --   --   --  6,926.00*  --   --   --   --   --    LACTATE mmol/L  --   --   --   --   --   --  2.0 3.6*  --    PROCALCITONIN ng/mL 0.68* 0.99*  --   --   --  1.37*  --   --   --    TROPONIN T ng/mL  --   --   --   --   --   --   --   --  <0.010       Assessment/Plan   Assessment / Plan     Active Hospital Problems:  Active Hospital Problems    Diagnosis    • Suspected COVID-19 virus infection      Impression:  Acute hypoxemic respiratory failure  Covid-19 pneumonia  Altered mental status  Toxic/metabolic encephalopathy  Therapeutic drug monitoring of remdesivir  Alcohol use with concern for alcohol withdrawal  Fall outside of hospital  Hypomagnesemia  Hypokalemia  Hypophosphatemia    Plan:  Wean O2 to keep SPO2 greater than 90%  Continue Remdesivir to complete 5 day course.  Continue daily CMP for therapeutic drug monitoring.  Continue to trend inflammatory markers.  Continue Decadron 10 mg IV daily for 10 days.  Continue nebulizers and bronchopulmonary hygiene.  Continue IS and flutter valve.  Encourage to prone or lay side to side.  Otherwise, get patient out of bed and into chair.  Continue CIWA protocol, multivitamin, thiamine, folic acid.  Give Lasix 40 mg IV x1.  Monitor renal function and electrolytes.  Replace magnesium, potassium and phosphorus       DVT prophylaxis:  Medical DVT prophylaxis orders are present.    CODE STATUS:   Level Of Support Discussed With: Patient  Code Status: CPR  Medical Interventions (Level of Support Prior to Arrest): Full    Labs, imaging, notes, microbiology and medications personally reviewed.  Discussed with primary and bedside RN.    Electronically signed by Herbert Oliva MD, 09/08/21, 4:15 PM  EDT.

## 2021-09-08 NOTE — PLAN OF CARE
Goal Outcome Evaluation:      Pt is very agitated and appears to be going through alcohol withdrawal. Pt pulled out his IV and will not allow another to be put in. Pt is getting out of bed, experiencing tactile disturbances, audio, and visual hallucinations. Les Wolfe RN

## 2021-09-08 NOTE — PROGRESS NOTES
[facility]   Hospitalist Progress Note  Date: 2021  Patient Name: Conrad Guerrier  : 1960  MRN: [id number]  Date of admission: 2021  Consultants:   -Pulmonology/critical care: Dr. Herbert Oliva, Dr. Andres Dye    Subjective   Subjective     Chief Complaint: Shortness of breath    Summary:   Conrad Guerrier is a 61 y.o. male with past medical history significant for neuropathy, alcohol abuse who presented to ED with altered mental status.  Evaluation in the ED significant for patient requiring submental O2 to maintain O2 sats greater than 90%, chest x-ray obtained and demonstrated bilateral infiltrates consistent with multifocal pneumonia.  COVID-19 testing obtained and returned positive.  Patient had worsening hypoxia on morning of 2021 and required NIPPV.  Pulmonology consulted to assist in care.  Patient's respiratory status improved and patient's O2 requirement weaned as tolerated.  Treatment with Decadron, remdesivir, bronchodilators and cefepime continued during admission.  Patient went into SVT with heart rates in the 190s and started agonal breathing.  RRT was called and the patient was intubated and moved to the ICU on .    Interval Followup:   --Patient had SVT with heart rates in the 190s today  --At bedside patient was agonal he breathing RRT was called and patient was intubated by pulmonary/critical care  --She was moved to ICU overflow  --Intubated and sedated currently  --Is requiring Levophed for hypotension  --Restart antibiotics as they were discontinued yesterday   --Cefepime and vancomycin and send MRSA swab  --Send proBNP to see if if he is having worsening pulmonary edema possible  --Blood cell count increased from 10 up to 15.  --Calcitonin was at 0.99 to 0.68    Review of Systems  Cannot review symptoms of sedated and intubated    Objective   Objective     Vitals:   Temp:  [95.7 °F (35.4 °C)-98.7 °F (37.1 °C)] 95.7 °F (35.4 °C)  Heart Rate:  [] 101  Resp:   [18-28] 24  BP: ()/() 97/76  Flow (L/min):  [5] 5  FiO2 (%):  [40 %-100 %] 40 %  Physical Exam   Gen: Cute respiratory distress.  Agonal breathing.  Patient was intubated at bedside.  HEENT: MMM, Atraumatic  Neck: Supple, Trachea midline  Resp: Good aeration, CTAB, No w/r/r, respiratory distress before being intubated  Card: Tachycardic regular., No m/r/g  Abd: Soft, Nontender, Nondistended, + bowel sounds  Ext: No cyanosis, No clubbing  Neuro: CN II-XII grossly intact, No focal deficits appreciated  Psych: AAO x 2, Normal mood, Normal affect    Result Review    Result Review:  I have personally reviewed the results from the time of this admission to 9/8/2021 15:30 EDT and agree with these findings:  Viewed imaging and labs    Assessment/Plan   Assessment / Plan     Assessment:  COVID-19 pneumonia  Acute hypoxic respiratory failure requiring intubation on 9/8  Viral pneumonia  Toxic/metabolic encephalopathy  Therapeutic drug level monitoring, remdesivir  Sinus tachycardia  Alcohol abuse  Hypomagnesemia  Hypokalemia  Alcohol withdrawal  Mechanical fall    Plan:  -Pulmonology/Critical Care consulted and following, appreciate assistance and recommendations in the care of this patient.  -Continue supplemental O2 to maintain sats greater than 90%, wean as tolerated  -Continue remdesivir with plan to treat for total 5 days  -Continue Decadron IV, plan to treat for total of 10 days  -Trend COVID-19 inflammatory markers  -Restart vancomycin and cefepime  -Procalcitonin in AM  -Continue Levophed for maps less than 65  -Follow urine output  -Status post Lasix yesterday we will send a BNP see if he may need some more diuresis  -Continue nebulizers, bronchopulmonary hygiene protocol, incentive spirometer and flutter valve  -Continue CIWA protocol  -Continue thiamine, multivitamin and folic acid  -Will monitor electrolytes, LFTs and renal function with CMP and magnesium level in the AM  -Will monitor WBC and Hgb with  CBC in the AM  -Clinical course will dictate further management     DVT Prophylaxis: Lovenox  Diet: Regular  Dispo: Home when medically appropriate for discharge  Code Status: Full code     Personally reviewed patients labs and imaging, discussed with patient and nurse at bedside. Discussed case with the following consultants: Pulmonology.     Part of this note may be an electronic transcription/translation of spoken language to printed text using the Dragon dictation system.    DVT prophylaxis:  Lovenox     CODE STATUS:   Level Of Support Discussed With: Patient  Code Status: CPR  Medical Interventions (Level of Support Prior to Arrest): Full      Electronically signed by Abdirahman Rivas MD, 09/08/21, 3:30 PM EDT.

## 2021-09-08 NOTE — PROGRESS NOTES
Pulmonary / Critical Care Progress Note      Patient Name: Conrad Guerrier  : 1960  MRN: [id number]  Attending:  Abdirahman Rivas MD  Date of admission: 2021    Subjective   Subjective   Patient critically ill with respiratory failure  Rapid response called today  I walked in the room patient agonal he breathing  Unresponsive to deep painful stimulation  Patient was emergently intubated at the bedside      Review of Systems  Unable to obtain intubated    Objective   Objective     Vitals:   Temp:  [95.7 °F (35.4 °C)-98.7 °F (37.1 °C)] 95.7 °F (35.4 °C)  Heart Rate:  [] 101  Resp:  [18-28] 24  BP: ()/() 97/76  Flow (L/min):  [5] 5  FiO2 (%):  [40 %-100 %] 40 %    Physical Exam   GEN-patient is intubated, sedated and on mechanical ventilation  Eyes-PERRL,   there is no  scleral icterus  Nasal-both nares are patent and appear to be free of occlusion  Mouth  endotracheal tube in place  Neck-there are no palpable supraclavicular or cervical adenopathy,  Respiratory-patient is intubated sedated on mechanical ventilation, is synchronous with the ventilator, no wheezes rales or rhonchi heard, chest normal to percussion  Cardiovascular-the heart rate is normal and regular S1 and S2 present with no murmur or extra heart sounds, there is no JVD or pedal edema present  GI-the abdomen is normal in appearance, bowel sounds present and normal in all quadrants no hepatosplenomegaly or masses felt  Extremities-no clubbing is present, pulses present in all extremities, capillary refill time is normal  Skin-skin is normal in appearance it is warm and dry, no rashes present, no evidence of cyanosis  Neurological-the patient is intubated and sedated    Result Review    Result Review:  I have personally reviewed the results from the time of this admission to 2021 15:27 EDT and agree with these findings:  [x]  Laboratory  [x]  Microbiology  [x]  Radiology  [x]  EKG/Telemetry   []  Cardiology/Vascular   []   Pathology  []  Old records  [x]  Other:    WBC 10.4, Cr 0.67, K+ 3.7, Mg+ 1.4    COVID LABS:  Results From Last 14 Days   Lab Units 09/08/21  0417 09/07/21  0357 09/06/21  0630 09/06/21  0629 09/05/21  1454 09/05/21  0530 09/05/21  0136 09/04/21  2221 09/04/21  2214   PROBNP pg/mL  --   --   --   --  3,971.0*  --   --   --   --    CRP mg/dL 6.02*  --   --  16.50*  --   --   --   --   --    D DIMER QUANT mg/L (FEU) 1.80*  --  1.36*  --   --   --   --   --   --    FERRITIN ng/mL 3,391.00*  --   --  6,926.00*  --   --   --   --   --    LACTATE mmol/L  --   --   --   --   --   --  2.0 3.6*  --    PROCALCITONIN ng/mL 0.68* 0.99*  --   --   --  1.37*  --   --   --    TROPONIN T ng/mL  --   --   --   --   --   --   --   --  <0.010       Assessment/Plan   Assessment / Plan     Active Hospital Problems:  Active Hospital Problems    Diagnosis    • Suspected COVID-19 virus infection      Impression:  Acute hypoxemic respiratory failure  Covid-19 pneumonia  Altered mental status  Toxic/metabolic encephalopathy  Therapeutic drug monitoring of remdesivir  Alcohol use with concern for alcohol withdrawal  Fall outside of hospital    Plan:  Respiratory: Patient intubated at the bedside emergently due to agonal breathing  Place patient on assist control tidal volume 450 respiratory rate of 20 and a PEEP of 10      Cardiac: Patient is hypotensive at this time avoid fluid due to worsening respiratory failure and oxygen requirement, will use Levophed      Gi: Does have mild transaminitis we will continue to follow daily LFTs, will place NG tube and start tube feeding today      Renal: Renal function stable will replace potassium and phosphorus with sodium phosphate to the IV      Neuro: We will start propofol and fentanyl for sedation      Endo: Sugars controlled we will start sliding scale insulin      Heme: Counts stable continue to monitor      ID: Continue remdesivir with daily LFTs and renal panel for toxic drug monitoring,  continue IV Decadron      FEN: Start tube feedings, replace magnesium with 4 g of IV mag, replace potassium phosphate use with sodium phosphate      PPX: Lovenox for DVT prophylaxis and Pepcid         DVT prophylaxis:  Medical DVT prophylaxis orders are present.    CODE STATUS:   Level Of Support Discussed With: Patient  Code Status: CPR  Medical Interventions (Level of Support Prior to Arrest): Full  Patient is critically ill in ICU with  respiratory failure and shock due to Covid pneumonia  I spent 31 minutes of critical care time excluding any procedure notes, spent in review, analysis, obtaining history and physical, formulating care plan, and I lead multi-disciplinary critical care rounds with bedside nurse, respiratory therapist, clinical pharmacist and other allied services. I have discussed the base with primary service and other consultants as well    Electronically signed by Laz Uribe DO, 09/08/21, 3:27 PM EDT.

## 2021-09-09 ENCOUNTER — APPOINTMENT (OUTPATIENT)
Dept: GENERAL RADIOLOGY | Facility: HOSPITAL | Age: 61
End: 2021-09-09

## 2021-09-09 LAB
ALBUMIN SERPL-MCNC: 2.3 G/DL (ref 3.5–5.2)
ALBUMIN SERPL-MCNC: 2.3 G/DL (ref 3.5–5.2)
ALBUMIN/GLOB SERPL: 0.7 G/DL
ALBUMIN/GLOB SERPL: 0.7 G/DL
ALP SERPL-CCNC: 340 U/L (ref 39–117)
ALP SERPL-CCNC: 383 U/L (ref 39–117)
ALT SERPL W P-5'-P-CCNC: 39 U/L (ref 1–41)
ALT SERPL W P-5'-P-CCNC: 42 U/L (ref 1–41)
ANION GAP SERPL CALCULATED.3IONS-SCNC: 10.2 MMOL/L (ref 5–15)
ANION GAP SERPL CALCULATED.3IONS-SCNC: 10.6 MMOL/L (ref 5–15)
ANISOCYTOSIS BLD QL: ABNORMAL
ANISOCYTOSIS BLD QL: NORMAL
ARTERIAL PATENCY WRIST A: POSITIVE
AST SERPL-CCNC: 101 U/L (ref 1–40)
AST SERPL-CCNC: 114 U/L (ref 1–40)
BACTERIA SPEC AEROBE CULT: NORMAL
BACTERIA SPEC AEROBE CULT: NORMAL
BASE EXCESS BLDA CALC-SCNC: 0.5 MMOL/L (ref -2–2)
BASOPHILS # BLD AUTO: 0.14 10*3/MM3 (ref 0–0.2)
BASOPHILS NFR BLD AUTO: 0.7 % (ref 0–1.5)
BDY SITE: ABNORMAL
BILIRUB SERPL-MCNC: 1.2 MG/DL (ref 0–1.2)
BILIRUB SERPL-MCNC: 1.2 MG/DL (ref 0–1.2)
BUN SERPL-MCNC: 14 MG/DL (ref 8–23)
BUN SERPL-MCNC: 14 MG/DL (ref 8–23)
BUN/CREAT SERPL: 23 (ref 7–25)
BUN/CREAT SERPL: 30.4 (ref 7–25)
CALCIUM SPEC-SCNC: 7.7 MG/DL (ref 8.6–10.5)
CALCIUM SPEC-SCNC: 7.7 MG/DL (ref 8.6–10.5)
CHLORIDE SERPL-SCNC: 100 MMOL/L (ref 98–107)
CHLORIDE SERPL-SCNC: 102 MMOL/L (ref 98–107)
CO2 SERPL-SCNC: 24.8 MMOL/L (ref 22–29)
CO2 SERPL-SCNC: 25.4 MMOL/L (ref 22–29)
COHGB MFR BLD: 0.2 % (ref 0–1.5)
CREAT SERPL-MCNC: 0.46 MG/DL (ref 0.76–1.27)
CREAT SERPL-MCNC: 0.61 MG/DL (ref 0.76–1.27)
DEPRECATED RDW RBC AUTO: 55.9 FL (ref 37–54)
DEPRECATED RDW RBC AUTO: 57.4 FL (ref 37–54)
EOSINOPHIL # BLD AUTO: 0.02 10*3/MM3 (ref 0–0.4)
EOSINOPHIL NFR BLD AUTO: 0.1 % (ref 0.3–6.2)
ERYTHROCYTE [DISTWIDTH] IN BLOOD BY AUTOMATED COUNT: 16.7 % (ref 12.3–15.4)
ERYTHROCYTE [DISTWIDTH] IN BLOOD BY AUTOMATED COUNT: 16.7 % (ref 12.3–15.4)
FHHB: 6.6 % (ref 0–5)
GAS FLOW AIRWAY: 0 LPM
GFR SERPL CREATININE-BSD FRML MDRD: 134 ML/MIN/1.73
GFR SERPL CREATININE-BSD FRML MDRD: >150 ML/MIN/1.73
GLOBULIN UR ELPH-MCNC: 3.1 GM/DL
GLOBULIN UR ELPH-MCNC: 3.3 GM/DL
GLUCOSE SERPL-MCNC: 132 MG/DL (ref 65–99)
GLUCOSE SERPL-MCNC: 138 MG/DL (ref 65–99)
HCO3 BLDA-SCNC: 23.3 MMOL/L (ref 22–26)
HCT VFR BLD AUTO: 35.4 % (ref 37.5–51)
HCT VFR BLD AUTO: 37.9 % (ref 37.5–51)
HGB BLD-MCNC: 12.8 G/DL (ref 13–17.7)
HGB BLD-MCNC: 13.3 G/DL (ref 13–17.7)
HGB BLDA-MCNC: 14.3 G/DL (ref 13.8–16.4)
IMM GRANULOCYTES # BLD AUTO: 0.76 10*3/MM3 (ref 0–0.05)
IMM GRANULOCYTES NFR BLD AUTO: 3.9 % (ref 0–0.5)
INHALED O2 CONCENTRATION: 35 %
LARGE PLATELETS: ABNORMAL
LYMPHOCYTES # BLD AUTO: 2.68 10*3/MM3 (ref 0.7–3.1)
LYMPHOCYTES # BLD MANUAL: 1.41 10*3/MM3 (ref 0.7–3.1)
LYMPHOCYTES NFR BLD AUTO: 13.8 % (ref 19.6–45.3)
LYMPHOCYTES NFR BLD MANUAL: 10 % (ref 19.6–45.3)
LYMPHOCYTES NFR BLD MANUAL: 5 % (ref 5–12)
MACROCYTES BLD QL SMEAR: ABNORMAL
MAGNESIUM SERPL-MCNC: 1.5 MG/DL (ref 1.6–2.4)
MAGNESIUM SERPL-MCNC: 2 MG/DL (ref 1.6–2.4)
MCH RBC QN AUTO: 33.3 PG (ref 26.6–33)
MCH RBC QN AUTO: 34.2 PG (ref 26.6–33)
MCHC RBC AUTO-ENTMCNC: 35.1 G/DL (ref 31.5–35.7)
MCHC RBC AUTO-ENTMCNC: 36.2 G/DL (ref 31.5–35.7)
MCV RBC AUTO: 94.7 FL (ref 79–97)
MCV RBC AUTO: 94.8 FL (ref 79–97)
METHGB BLD QL: 0.2 % (ref 0–1.5)
MODALITY: ABNORMAL
MONOCYTES # BLD AUTO: 0.71 10*3/MM3 (ref 0.1–0.9)
MONOCYTES # BLD AUTO: 1.54 10*3/MM3 (ref 0.1–0.9)
MONOCYTES NFR BLD AUTO: 7.9 % (ref 5–12)
MRSA DNA SPEC QL NAA+PROBE: NORMAL
NEUTROPHILS # BLD AUTO: 11.99 10*3/MM3 (ref 1.7–7)
NEUTROPHILS NFR BLD AUTO: 14.28 10*3/MM3 (ref 1.7–7)
NEUTROPHILS NFR BLD AUTO: 73.6 % (ref 42.7–76)
NEUTROPHILS NFR BLD MANUAL: 85 % (ref 42.7–76)
NOTE: ABNORMAL
NRBC BLD AUTO-RTO: 0.3 /100 WBC (ref 0–0.2)
NRBC SPEC MANUAL: 2 /100 WBC (ref 0–0.2)
NT-PROBNP SERPL-MCNC: ABNORMAL PG/ML (ref 0–900)
OXYHGB MFR BLDV: 93 % (ref 94–99)
PCO2 BLDA: 32.3 MM HG (ref 35–45)
PEEP RESPIRATORY: 5 CM[H2O]
PH BLDA: 7.48 PH UNITS (ref 7.35–7.45)
PHOSPHATE SERPL-MCNC: 2.4 MG/DL (ref 2.5–4.5)
PHOSPHATE SERPL-MCNC: 3.1 MG/DL (ref 2.5–4.5)
PLATELET # BLD AUTO: 173 10*3/MM3 (ref 140–450)
PLATELET # BLD AUTO: 210 10*3/MM3 (ref 140–450)
PMV BLD AUTO: 13.7 FL (ref 6–12)
PO2 BLD: 196 MM[HG] (ref 0–500)
PO2 BLDA: 68.5 MM HG (ref 80–100)
POLYCHROMASIA BLD QL SMEAR: ABNORMAL
POLYCHROMASIA BLD QL SMEAR: NORMAL
POTASSIUM SERPL-SCNC: 3.7 MMOL/L (ref 3.5–5.2)
POTASSIUM SERPL-SCNC: 4.4 MMOL/L (ref 3.5–5.2)
PROCALCITONIN SERPL-MCNC: 0.59 NG/ML (ref 0–0.25)
PROT SERPL-MCNC: 5.4 G/DL (ref 6–8.5)
PROT SERPL-MCNC: 5.6 G/DL (ref 6–8.5)
RBC # BLD AUTO: 3.74 10*6/MM3 (ref 4.14–5.8)
RBC # BLD AUTO: 4 10*6/MM3 (ref 4.14–5.8)
SAO2 % BLDCOA: 93.4 % (ref 95–99)
SMALL PLATELETS BLD QL SMEAR: ADEQUATE
SMALL PLATELETS BLD QL SMEAR: ADEQUATE
SODIUM SERPL-SCNC: 135 MMOL/L (ref 136–145)
SODIUM SERPL-SCNC: 138 MMOL/L (ref 136–145)
TARGETS BLD QL SMEAR: ABNORMAL
VENTILATOR MODE: ABNORMAL
WBC # BLD AUTO: 14.1 10*3/MM3 (ref 3.4–10.8)
WBC # BLD AUTO: 19.42 10*3/MM3 (ref 3.4–10.8)
WBC MORPH BLD: NORMAL
WBC MORPH BLD: NORMAL

## 2021-09-09 PROCEDURE — 94003 VENT MGMT INPAT SUBQ DAY: CPT

## 2021-09-09 PROCEDURE — 83050 HGB METHEMOGLOBIN QUAN: CPT

## 2021-09-09 PROCEDURE — 94799 UNLISTED PULMONARY SVC/PX: CPT

## 2021-09-09 PROCEDURE — 99233 SBSQ HOSP IP/OBS HIGH 50: CPT | Performed by: INTERNAL MEDICINE

## 2021-09-09 PROCEDURE — 85025 COMPLETE CBC W/AUTO DIFF WBC: CPT

## 2021-09-09 PROCEDURE — 25010000002 VANCOMYCIN 5 G RECONSTITUTED SOLUTION: Performed by: INTERNAL MEDICINE

## 2021-09-09 PROCEDURE — 25010000002 FUROSEMIDE PER 20 MG: Performed by: INTERNAL MEDICINE

## 2021-09-09 PROCEDURE — 80053 COMPREHEN METABOLIC PANEL: CPT

## 2021-09-09 PROCEDURE — 99291 CRITICAL CARE FIRST HOUR: CPT | Performed by: INTERNAL MEDICINE

## 2021-09-09 PROCEDURE — 71045 X-RAY EXAM CHEST 1 VIEW: CPT

## 2021-09-09 PROCEDURE — 25010000002 DEXAMETHASONE PER 1 MG: Performed by: NURSE PRACTITIONER

## 2021-09-09 PROCEDURE — 25010000002 ENOXAPARIN PER 10 MG: Performed by: INTERNAL MEDICINE

## 2021-09-09 PROCEDURE — 25010000002 CEFEPIME PER 500 MG: Performed by: INTERNAL MEDICINE

## 2021-09-09 PROCEDURE — 25010000003 MAGNESIUM SULFATE 4 GM/100ML SOLUTION: Performed by: INTERNAL MEDICINE

## 2021-09-09 PROCEDURE — 92610 EVALUATE SWALLOWING FUNCTION: CPT

## 2021-09-09 PROCEDURE — 84100 ASSAY OF PHOSPHORUS: CPT

## 2021-09-09 PROCEDURE — 82375 ASSAY CARBOXYHB QUANT: CPT

## 2021-09-09 PROCEDURE — 82805 BLOOD GASES W/O2 SATURATION: CPT

## 2021-09-09 PROCEDURE — 36600 WITHDRAWAL OF ARTERIAL BLOOD: CPT

## 2021-09-09 PROCEDURE — 83735 ASSAY OF MAGNESIUM: CPT

## 2021-09-09 PROCEDURE — 85007 BL SMEAR W/DIFF WBC COUNT: CPT

## 2021-09-09 PROCEDURE — 25010000002 PROPOFOL 10 MG/ML EMULSION: Performed by: INTERNAL MEDICINE

## 2021-09-09 RX ORDER — MAGNESIUM SULFATE HEPTAHYDRATE 40 MG/ML
4 INJECTION, SOLUTION INTRAVENOUS ONCE
Status: COMPLETED | OUTPATIENT
Start: 2021-09-09 | End: 2021-09-09

## 2021-09-09 RX ORDER — FUROSEMIDE 10 MG/ML
40 INJECTION INTRAMUSCULAR; INTRAVENOUS EVERY 12 HOURS
Status: DISCONTINUED | OUTPATIENT
Start: 2021-09-09 | End: 2021-09-11

## 2021-09-09 RX ORDER — FENTANYL/ROPIVACAINE/NS/PF 2-625MCG/1
15 PLASTIC BAG, INJECTION (ML) EPIDURAL ONCE
Status: COMPLETED | OUTPATIENT
Start: 2021-09-09 | End: 2021-09-09

## 2021-09-09 RX ADMIN — DEXAMETHASONE SODIUM PHOSPHATE 10 MG: 10 INJECTION INTRAMUSCULAR; INTRAVENOUS at 08:35

## 2021-09-09 RX ADMIN — FUROSEMIDE 40 MG: 10 INJECTION, SOLUTION INTRAMUSCULAR; INTRAVENOUS at 20:27

## 2021-09-09 RX ADMIN — BUDESONIDE 0.5 MG: 0.5 INHALANT ORAL at 19:03

## 2021-09-09 RX ADMIN — ENOXAPARIN SODIUM 40 MG: 40 INJECTION SUBCUTANEOUS at 08:37

## 2021-09-09 RX ADMIN — CEFEPIME HYDROCHLORIDE 2 G: 2 INJECTION, POWDER, FOR SOLUTION INTRAVENOUS at 17:38

## 2021-09-09 RX ADMIN — BUDESONIDE 0.5 MG: 0.5 INHALANT ORAL at 07:47

## 2021-09-09 RX ADMIN — CEFEPIME HYDROCHLORIDE 2 G: 2 INJECTION, POWDER, FOR SOLUTION INTRAVENOUS at 00:21

## 2021-09-09 RX ADMIN — PROPOFOL 50 MCG/KG/MIN: 10 INJECTION, EMULSION INTRAVENOUS at 04:18

## 2021-09-09 RX ADMIN — FAMOTIDINE 20 MG: 10 INJECTION INTRAVENOUS at 08:36

## 2021-09-09 RX ADMIN — VANCOMYCIN HYDROCHLORIDE 1500 MG: 5 INJECTION, POWDER, LYOPHILIZED, FOR SOLUTION INTRAVENOUS at 01:48

## 2021-09-09 RX ADMIN — FUROSEMIDE 40 MG: 10 INJECTION, SOLUTION INTRAMUSCULAR; INTRAVENOUS at 08:35

## 2021-09-09 RX ADMIN — ARFORMOTEROL TARTRATE 15 MCG: 15 SOLUTION RESPIRATORY (INHALATION) at 07:45

## 2021-09-09 RX ADMIN — ARFORMOTEROL TARTRATE 15 MCG: 15 SOLUTION RESPIRATORY (INHALATION) at 19:03

## 2021-09-09 RX ADMIN — FAMOTIDINE 20 MG: 10 INJECTION INTRAVENOUS at 20:27

## 2021-09-09 RX ADMIN — PROPOFOL 45 MCG/KG/MIN: 10 INJECTION, EMULSION INTRAVENOUS at 00:22

## 2021-09-09 RX ADMIN — POTASSIUM PHOSPHATE, MONOBASIC AND POTASSIUM PHOSPHATE, DIBASIC 15 MMOL: 224; 236 INJECTION, SOLUTION, CONCENTRATE INTRAVENOUS at 06:32

## 2021-09-09 RX ADMIN — NICOTINE 1 PATCH: 21 PATCH, EXTENDED RELEASE TRANSDERMAL at 10:32

## 2021-09-09 RX ADMIN — REMDESIVIR 100 MG: 100 INJECTION, POWDER, LYOPHILIZED, FOR SOLUTION INTRAVENOUS at 13:58

## 2021-09-09 RX ADMIN — MAGNESIUM SULFATE 4 G: 4 INJECTION INTRAVENOUS at 12:38

## 2021-09-09 RX ADMIN — CEFEPIME HYDROCHLORIDE 2 G: 2 INJECTION, POWDER, FOR SOLUTION INTRAVENOUS at 08:35

## 2021-09-09 NOTE — THERAPY EVALUATION
Acute Care - Speech Language Pathology   Swallow Initial Evaluation  [place]     Patient Name: Conrad Guerrier  : 1960  MRN: [id number]  Today's Date: 2021               Admit Date: 2021    Visit Dx:     ICD-10-CM ICD-9-CM   1. Suspected COVID-19 virus infection  Z20.822 V01.79   2. Pneumonia due to infectious organism, unspecified laterality, unspecified part of lung  J18.9 486   3. Acute respiratory failure with hypoxia (CMS/[facility])  J96.01 518.81   4. Alcohol dependence with unspecified alcohol-induced disorder (CMS/[facility])  F10.29 303.90     291.9   5. Hypomagnesemia  E83.42 275.2     Patient Active Problem List   Diagnosis   • Suspected COVID-19 virus infection     History reviewed. No pertinent past medical history.  Past Surgical History:   Procedure Laterality Date   • ABDOMINAL SURGERY       PAIN SCALE: None reported    PRECAUTIONS/CONTRAINDICATIONS:  Enhanced airborne     SUSPECTED ABUSE/NEGLECT/EXPLOITATION:  None noted    SOCIAL/PSYCHOLOGICAL NEEDS/BARRIERS:  None noted    PAST SOCIAL HISTORY:  History of drug and alcohol use    PRIOR FUNCTION: Some recent decline in function    PATIENT GOALS/EXPECTATIONS: Unable to report    HISTORY: This patient is a 61-year-old white male admitted with Covid pneumonia. Patient with RRT called on  with intubation required.  Patient now extubated.  CIWA protocol    CURRENT DIET LEVEL:  NPO    OBJECTIVE:    TEST ADMINISTERED:  Clinical dysphagia evaluation    COGNITION/SAFETY AWARENESS:  Drowsy but awakens with stimulation    BEHAVIORAL OBSERVATIONS:  Does not follow commands     ORAL MOTOR EXAM:  Generalize oral motor weakness.      VOICE QUALITY:  Unable to to elicit.    REFLEX EXAM:  Deferred    POSTURE:  90 degrees upright    FEEDING/SWALLOWING FUNCTION:  Assessed using multimodalic stimulation only.      CLINICAL OBSERVATIONS:  Patient making minimal effort for bolus prep.  Limited lingual AP propulsion.  Swallow elicited x 2 with moist swab, reduced  laryngeal elevation noted.  Increased resp rate noted with stimulation.  Does not appear to be safe for po intake at this time.     DYSPHAGIA CRITERIA:  Risk of aspiration    FUNCTIONAL ASSESSMENT INSTRUMENT: Patient currently scored a level 1 of 7 on Functional Communication Measures for swallowing indicating a 100% limitation in function.    ASSESSMENT/ PLAN OF CARE:  Pt presents with limitations, noted below, that impede his ability to swallow safely. The skills of a therapist will be required to safely and effectively implement the following treatment plan to restore maximal level of function.    PROBLEMS:  1.  Dysphagia   LTG 1:  (30 days) Patient will increase ability to tolerate least restrictive diet and improve FCM for swallow to 4 of 7.    STG 1a: (14 days) Patient will elicit swallow on command with multimodalic stimulation x10 per session.    STG 1b: (14 days) Patient will tolerate therapeutic trials of thin, nectar, honey thick liquids without s/s of aspiration with 8 of 10 trials.    STG 1c: (14 days) Patient will tolerate therapeutic trials of soft solids and puree consistencies without s/s of aspiration with 8 of 10 trials.    STG 1d: (14 days) Patient/family teaching regarding diet recommendations and safe swallow strategies.    TREATMENT: Dysphagia tx to ensure diet recommendations and safe swallow strategies.     FREQUENCY/DURATION:  BID 5 times per week.     REHAB POTENTIAL:  Pt has good rehab potential.  The following limitations may influence improvement/ length of tx  Medical status.    RECOMMENDATIONS:   1.   DIET: Unable to recommend safe po diet at this time.      Patient will likely need short term tube feeds.        YES: Pt/responsible party agrees with plan of care and has been informed of all alternatives, risks and benefits.        EDUCATION  The patient has been educated in the following areas:   NPO rationale.          Rachell Meneses, SLP  9/9/2021

## 2021-09-09 NOTE — PROGRESS NOTES
Pulmonary / Critical Care Progress Note      Patient Name: Conrad Guerrier  : 1960  MRN: [id number]  Attending:  Odell Curtis MD  Date of admission: 2021    Subjective   Subjective   Patient critically ill with respiratory failure  Rapid response called today  I walked in the room patient agonal he breathing  Unresponsive to deep painful stimulation  Patient was emergently intubated at the bedside      Review of Systems  Unable to obtain intubated    Objective   Objective     Vitals:   Temp:  [95.7 °F (35.4 °C)-99 °F (37.2 °C)] 98.8 °F (37.1 °C)  Heart Rate:  [] 113  Resp:  [14-28] 22  BP: ()/() 88/75  Flow (L/min):  [5] 5  FiO2 (%):  [35 %-100 %] 35 %    Physical Exam   GEN-patient is intubated, sedated and on mechanical ventilation  Eyes-PERRL,   there is no  scleral icterus  Nasal-both nares are patent and appear to be free of occlusion  Mouth  endotracheal tube in place  Neck-there are no palpable supraclavicular or cervical adenopathy,  Respiratory-patient is intubated sedated on mechanical ventilation, is synchronous with the ventilator, no wheezes rales or rhonchi heard, chest normal to percussion  Cardiovascular-the heart rate is normal and regular S1 and S2 present with no murmur or extra heart sounds, there is no JVD or pedal edema present  GI-the abdomen is normal in appearance, bowel sounds present and normal in all quadrants no hepatosplenomegaly or masses felt  Extremities-no clubbing is present, pulses present in all extremities, capillary refill time is normal  Skin-skin is normal in appearance it is warm and dry, no rashes present, no evidence of cyanosis  Neurological-the patient is intubated and sedated    Result Review    Result Review:  I have personally reviewed the results from the time of this admission to 2021 06:53 EDT and agree with these findings:  [x]  Laboratory  [x]  Microbiology  [x]  Radiology  [x]  EKG/Telemetry   []  Cardiology/Vascular   []   Pathology  []  Old records  [x]  Other:    WBC 10.4, Cr 0.67, K+ 3.7, Mg+ 1.4    COVID LABS:  Results From Last 14 Days   Lab Units 09/08/21  2305 09/08/21  0417 09/07/21  0357 09/06/21  0630 09/06/21  0629 09/05/21  1454 09/05/21  0530 09/05/21  0136 09/04/21  2221 09/04/21  2214   PROBNP pg/mL 13,994.0*  --   --   --   --  3,971.0*  --   --   --   --    CRP mg/dL  --  6.02*  --   --  16.50*  --   --   --   --   --    D DIMER QUANT mg/L (FEU)  --  1.80*  --  1.36*  --   --   --   --   --   --    FERRITIN ng/mL  --  3,391.00*  --   --  6,926.00*  --   --   --   --   --    LACTATE mmol/L  --   --   --   --   --   --   --  2.0 3.6*  --    PROCALCITONIN ng/mL 0.59* 0.68* 0.99*  --   --   --    < >  --   --   --    TROPONIN T ng/mL  --   --   --   --   --   --   --   --   --  <0.010    < > = values in this interval not displayed.       Assessment/Plan   Assessment / Plan     Active Hospital Problems:  Active Hospital Problems    Diagnosis    • Suspected COVID-19 virus infection      Impression:  Acute hypoxemic respiratory failure  Covid-19 pneumonia  Altered mental status  Toxic/metabolic encephalopathy  Therapeutic drug monitoring of remdesivir  Alcohol use with concern for alcohol withdrawal  Fall outside of hospital    Plan:  Respiratory: Patient intubated at the bedside emergently due to agonal breathing on 9/8  Place patient on assist control tidal volume 450 respiratory rate of 20 and a PEEP of 5, 30% Fio2  SBT today  Will extubate today to nasal cannula, if able.    Cardiac: Patient is hypotensive at this time avoid fluid due to worsening respiratory failure and oxygen requirement, will use Levophed, likely due to sedation  Will obtain ECHO today\  Currently in sinus tach likely due to levophed irritation, will reassess once patient is extubated and sedation is off. Also pending ECHO being obtained today.       Gi: Does have mild transaminitis we will continue to follow daily LFTs, will place NG tube and start  tube feeding today pending extubation.,      Renal: Renal function stable. Will replace electrolytes as clinically indicated.       Neuro: We will start propofol and fentanyl for sedation, will wean in attempts to extubate today      Endo: Sugars controlled we will start sliding scale insulin      Heme: Counts stable continue to monitor      ID: Continue remdesivir with daily LFTs and renal panel for toxic drug monitoring, continue IV Decadron; Will discontinue vanc due to negative MRSA; will continue cefepime      FEN: Tube feeds pending extubation, will address nutrition if extubated.       PPX: Lovenox for DVT prophylaxis and Pepcid         DVT prophylaxis:  Medical DVT prophylaxis orders are present.    CODE STATUS:   Level Of Support Discussed With: Patient  Code Status: CPR  Medical Interventions (Level of Support Prior to Arrest): Full  Patient is critically ill in ICU with  respiratory failure and shock due to Covid pneumonia  I spent 30 minutes of critical care time excluding any procedure notes, spent in review, analysis, obtaining history and physical, formulating care plan, and I lead multi-disciplinary critical care rounds with bedside nurse, respiratory therapist, clinical pharmacist and other allied services. I have discussed the base with primary service and other consultants as well    Electronically signed by Laz Uribe DO, 09/09/21, 6:53 AM EDT.

## 2021-09-09 NOTE — PLAN OF CARE
Goal Outcome Evaluation:      FUNCTIONAL ASSESSMENT INSTRUMENT: Patient currently scored a level 1 of 7 on Functional Communication Measures for swallowing indicating a 100% limitation in function.     ASSESSMENT/ PLAN OF CARE:  Pt presents with limitations, noted below, that impede his ability to swallow safely. The skills of a therapist will be required to safely and effectively implement the following treatment plan to restore maximal level of function.     PROBLEMS:  1.  Dysphagia              LTG 1:  (30 days) Patient will increase ability to tolerate least restrictive diet and improve FCM for swallow to 4 of 7.               STG 1a: (14 days) Patient will elicit swallow on command with multimodalic stimulation x10 per session.               STG 1b: (14 days) Patient will tolerate therapeutic trials of thin, nectar, honey thick liquids without s/s of aspiration with 8 of 10 trials.               STG 1c: (14 days) Patient will tolerate therapeutic trials of soft solids and puree consistencies without s/s of aspiration with 8 of 10 trials.               STG 1d: (14 days) Patient/family teaching regarding diet recommendations and safe swallow strategies.               TREATMENT: Dysphagia tx to ensure diet recommendations and safe swallow strategies.      FREQUENCY/DURATION:  BID 5 times per week.      REHAB POTENTIAL:  Pt has good rehab potential.  The following limitations may influence improvement/ length of tx  Medical status.     RECOMMENDATIONS:   1.   DIET: Unable to recommend safe po diet at this time.      Patient will likely need short term tube feeds.

## 2021-09-09 NOTE — PROGRESS NOTES
[facility]   Hospitalist Progress Note  Date: 2021  Patient Name: Conrad Guerrier  : 1960  MRN: [id number]  Date of admission: 2021  Consultants:   -Pulmonology/Critical Care: Dr. Laz Uribe, Dr. Herbert Oliva    Subjective   Subjective     Chief Complaint: Shortness of breath    Summary:   Conrad Guerrier is a 61 y.o. male with past medical history significant for neuropathy, alcohol abuse who presented to ED with altered mental status.  Evaluation in the ED significant for patient requiring submental O2 to maintain O2 sats greater than 90%, chest x-ray obtained and demonstrated bilateral infiltrates consistent with multifocal pneumonia.  COVID-19 testing obtained and returned positive.  Patient had worsening hypoxia on morning of 2021 and required NIPPV.  Pulmonology consulted to assist in care.  Patient's respiratory status improved and patient's O2 requirement weaned as tolerated.  Treatment with Decadron, remdesivir, bronchodilators and cefepime continued during admission.  Patient went into SVT with heart rates in the 190s and started agonal breathing.  RRT was called and the patient was intubated and moved to the ICU on .    Interval Followup:   Patient remains altered and confused.  Patient extubated to nasal cannula today by Pulmonology/Critical Care.  Patient does not follow commands.  In restraints.  Nursing with no additional acute issues to report.  Levophed requirement being weaned as tolerated.    Antibiotics:   -Cefepime  -Vancomycin    Review of Systems   Unable to obtain secondary to patient's altered mental status    Objective   Objective     Vitals:   Temp:  [96 °F (35.6 °C)-99 °F (37.2 °C)] 97 °F (36.1 °C)  Heart Rate:  [] 109  Resp:  [22-28] 24  BP: ()/() 99/76  Flow (L/min):  [5] 5  FiO2 (%):  [35 %] 35 %  Physical Exam   Gen: Critically ill, altered, not responsive or following commands  HEENT: MMM, diffuse bruising appreciated over patient's  nose and forehead  Neck: Supple, Trachea midline  Resp: Poor respiratory effort, equal chest rise bilaterally, tachypnea  Card: Tachycardia, regular rhythm, No m/r/g  Abd: Soft, Nontender, Nondistended, + bowel sounds  Ext: No cyanosis, No clubbing  Neuro: CN II-XII grossly intact, No focal deficits appreciated, not following commands, AAO x 0    Result Review    Result Review:  I have personally reviewed the results from the time of this admission to 9/9/2021 14:47 EDT and agree with these findings:  [x]  Laboratory: Electrolytes and renal function stable.  WBC improved.  Hemoglobin stable.  [x]  Microbiology: COVID-19 (09/04/2021): Positive  [x]  Radiology: Chest x-ray showed bilateral pulmonary opacities.  []  EKG/Telemetry:    []  Cardiology/Vascular:    []  Pathology:  []  Old records:  []  Other:    Assessment/Plan   Assessment / Plan     Assessment:  COVID-19 pneumonia  Acute hypoxic respiratory failure requiring intubation on 9/8  Viral pneumonia  Toxic/metabolic encephalopathy  Therapeutic drug level monitoring, remdesivir  Sinus tachycardia  Alcohol abuse  Hypomagnesemia  Hypokalemia  Alcohol withdrawal  Mechanical fall    Plan:  -Pulmonology/Critical Care consulted and following, appreciate assistance and recommendations in the care of this patient.  -Continue Levophed to maintain MAP greater than 65, wean as tolerated  -Continue supplemental O2 to maintain sats greater than 90%, wean as tolerated  -Echocardiogram ordered, follow-up results  -Mild transaminitis, continue to trend LFTs daily  -Continue dexamethasone and plan to treat for total of 10 days  -Continue vancomycin and cefepime, tailor antibiotics pending results of infectious work-up  -Continue nebulizers, bronchopulmonary hygiene protocol, since primary flutter valve  -Continue thiamine, multivitamin and folic acid  -Continue CIWA protocol  -Will monitor electrolytes and renal function with BMP and magnesium level in the AM  -Will monitor WBC  and Hgb with CBC in the AM  -Clinical course will dictate further management     DVT Prophylaxis: Lovenox  GI Prophylaxis: Pepcid  Diet: NPO  Dispo: Remain in ICU  Code Status: Full code     Personally reviewed patients labs and imaging, discussed with patient and nurse at bedside. Discussed case with the following consultants: Pulmonology/Critical Care.     Part of this note may be an electronic transcription/translation of spoken language to printed text using the Dragon dictation system.    DVT prophylaxis:  Medical DVT prophylaxis orders are present.    CODE STATUS:   Level Of Support Discussed With: Patient  Code Status: CPR  Medical Interventions (Level of Support Prior to Arrest): Full        Electronically signed by Odell Curtis MD, 09/09/21, 2:47 PM EDT.

## 2021-09-10 ENCOUNTER — APPOINTMENT (OUTPATIENT)
Dept: CARDIOLOGY | Facility: HOSPITAL | Age: 61
End: 2021-09-10

## 2021-09-10 LAB
ALBUMIN SERPL-MCNC: 2.3 G/DL (ref 3.5–5.2)
ALBUMIN/GLOB SERPL: 0.7 G/DL
ALP SERPL-CCNC: 345 U/L (ref 39–117)
ALT SERPL W P-5'-P-CCNC: 38 U/L (ref 1–41)
ANION GAP SERPL CALCULATED.3IONS-SCNC: 10 MMOL/L (ref 5–15)
AST SERPL-CCNC: 79 U/L (ref 1–40)
BACTERIA SPEC AEROBE CULT: NORMAL
BASOPHILS # BLD AUTO: 0.03 10*3/MM3 (ref 0–0.2)
BASOPHILS NFR BLD AUTO: 0.2 % (ref 0–1.5)
BH CV ECHO MEAS - AO ROOT DIAM: 3 CM
BH CV ECHO MEAS - EDV(MOD-SP2): 82 ML
BH CV ECHO MEAS - EDV(MOD-SP4): 74 ML
BH CV ECHO MEAS - EF(MOD-BP): 50.2 %
BH CV ECHO MEAS - ESV(MOD-SP2): 37 ML
BH CV ECHO MEAS - ESV(MOD-SP4): 41 ML
BH CV ECHO MEAS - IVSD: 1 CM
BH CV ECHO MEAS - LA DIMENSION(2D): 3.8 CM
BH CV ECHO MEAS - LVIDD: 4.5 CM
BH CV ECHO MEAS - LVIDS: 3.3 CM
BH CV ECHO MEAS - LVOT DIAM: 2 CM
BH CV ECHO MEAS - LVPWD: 1.1 CM
BH CV ECHO MEAS - RVDD: 2.9 CM
BILIRUB SERPL-MCNC: 1.2 MG/DL (ref 0–1.2)
BUN SERPL-MCNC: 17 MG/DL (ref 8–23)
BUN/CREAT SERPL: 30.9 (ref 7–25)
CALCIUM SPEC-SCNC: 7.8 MG/DL (ref 8.6–10.5)
CHLORIDE SERPL-SCNC: 101 MMOL/L (ref 98–107)
CLUMPED PLATELETS: PRESENT
CO2 SERPL-SCNC: 27 MMOL/L (ref 22–29)
CREAT SERPL-MCNC: 0.55 MG/DL (ref 0.76–1.27)
DEPRECATED RDW RBC AUTO: 53 FL (ref 37–54)
EOSINOPHIL # BLD AUTO: 0.01 10*3/MM3 (ref 0–0.4)
EOSINOPHIL NFR BLD AUTO: 0.1 % (ref 0.3–6.2)
ERYTHROCYTE [DISTWIDTH] IN BLOOD BY AUTOMATED COUNT: 16 % (ref 12.3–15.4)
GFR SERPL CREATININE-BSD FRML MDRD: >150 ML/MIN/1.73
GLOBULIN UR ELPH-MCNC: 3.3 GM/DL
GLUCOSE SERPL-MCNC: 152 MG/DL (ref 65–99)
HCT VFR BLD AUTO: 34.1 % (ref 37.5–51)
HGB BLD-MCNC: 12.6 G/DL (ref 13–17.7)
IMM GRANULOCYTES # BLD AUTO: 0.57 10*3/MM3 (ref 0–0.05)
IMM GRANULOCYTES NFR BLD AUTO: 3.8 % (ref 0–0.5)
LARGE PLATELETS: NORMAL
LEFT ATRIUM VOLUME INDEX: 19.2 ML/M2
LYMPHOCYTES # BLD AUTO: 2.02 10*3/MM3 (ref 0.7–3.1)
LYMPHOCYTES NFR BLD AUTO: 13.3 % (ref 19.6–45.3)
MAGNESIUM SERPL-MCNC: 1.6 MG/DL (ref 1.6–2.4)
MAXIMAL PREDICTED HEART RATE: 159 BPM
MCH RBC QN AUTO: 34.5 PG (ref 26.6–33)
MCHC RBC AUTO-ENTMCNC: 37 G/DL (ref 31.5–35.7)
MCV RBC AUTO: 93.4 FL (ref 79–97)
MONOCYTES # BLD AUTO: 1.28 10*3/MM3 (ref 0.1–0.9)
MONOCYTES NFR BLD AUTO: 8.4 % (ref 5–12)
NEUTROPHILS NFR BLD AUTO: 11.26 10*3/MM3 (ref 1.7–7)
NEUTROPHILS NFR BLD AUTO: 74.2 % (ref 42.7–76)
NRBC BLD AUTO-RTO: 0 /100 WBC (ref 0–0.2)
PHOSPHATE SERPL-MCNC: 1.7 MG/DL (ref 2.5–4.5)
PLATELET # BLD AUTO: 252 10*3/MM3 (ref 140–450)
PMV BLD AUTO: 13.5 FL (ref 6–12)
POIKILOCYTOSIS BLD QL SMEAR: NORMAL
POLYCHROMASIA BLD QL SMEAR: NORMAL
POTASSIUM SERPL-SCNC: 3.5 MMOL/L (ref 3.5–5.2)
PROT SERPL-MCNC: 5.6 G/DL (ref 6–8.5)
RBC # BLD AUTO: 3.65 10*6/MM3 (ref 4.14–5.8)
SODIUM SERPL-SCNC: 138 MMOL/L (ref 136–145)
STRESS TARGET HR: 135 BPM
TARGETS BLD QL SMEAR: NORMAL
WBC # BLD AUTO: 15.17 10*3/MM3 (ref 3.4–10.8)
WBC MORPH BLD: NORMAL

## 2021-09-10 PROCEDURE — 25010000002 MAGNESIUM SULFATE IN D5W 1G/100ML (PREMIX) 1-5 GM/100ML-% SOLUTION: Performed by: NURSE PRACTITIONER

## 2021-09-10 PROCEDURE — 25010000002 FUROSEMIDE PER 20 MG: Performed by: INTERNAL MEDICINE

## 2021-09-10 PROCEDURE — 25010000002 CEFEPIME PER 500 MG: Performed by: INTERNAL MEDICINE

## 2021-09-10 PROCEDURE — 93321 DOPPLER ECHO F-UP/LMTD STD: CPT

## 2021-09-10 PROCEDURE — 94799 UNLISTED PULMONARY SVC/PX: CPT

## 2021-09-10 PROCEDURE — 80053 COMPREHEN METABOLIC PANEL: CPT | Performed by: INTERNAL MEDICINE

## 2021-09-10 PROCEDURE — 99291 CRITICAL CARE FIRST HOUR: CPT | Performed by: INTERNAL MEDICINE

## 2021-09-10 PROCEDURE — 25010000002 ENOXAPARIN PER 10 MG: Performed by: INTERNAL MEDICINE

## 2021-09-10 PROCEDURE — 99233 SBSQ HOSP IP/OBS HIGH 50: CPT | Performed by: INTERNAL MEDICINE

## 2021-09-10 PROCEDURE — 93325 DOPPLER ECHO COLOR FLOW MAPG: CPT

## 2021-09-10 PROCEDURE — 25010000002 DEXAMETHASONE PER 1 MG: Performed by: NURSE PRACTITIONER

## 2021-09-10 PROCEDURE — 93308 TTE F-UP OR LMTD: CPT

## 2021-09-10 PROCEDURE — 92526 ORAL FUNCTION THERAPY: CPT

## 2021-09-10 PROCEDURE — 85025 COMPLETE CBC W/AUTO DIFF WBC: CPT | Performed by: INTERNAL MEDICINE

## 2021-09-10 PROCEDURE — 93321 DOPPLER ECHO F-UP/LMTD STD: CPT | Performed by: SPECIALIST

## 2021-09-10 PROCEDURE — 87899 AGENT NOS ASSAY W/OPTIC: CPT | Performed by: PHYSICIAN ASSISTANT

## 2021-09-10 PROCEDURE — 83735 ASSAY OF MAGNESIUM: CPT | Performed by: INTERNAL MEDICINE

## 2021-09-10 PROCEDURE — 84100 ASSAY OF PHOSPHORUS: CPT | Performed by: INTERNAL MEDICINE

## 2021-09-10 PROCEDURE — 85007 BL SMEAR W/DIFF WBC COUNT: CPT | Performed by: INTERNAL MEDICINE

## 2021-09-10 PROCEDURE — 93308 TTE F-UP OR LMTD: CPT | Performed by: SPECIALIST

## 2021-09-10 PROCEDURE — 93325 DOPPLER ECHO COLOR FLOW MAPG: CPT | Performed by: SPECIALIST

## 2021-09-10 RX ORDER — MIDODRINE HYDROCHLORIDE 2.5 MG/1
5 TABLET ORAL
Status: DISCONTINUED | OUTPATIENT
Start: 2021-09-10 | End: 2021-09-16 | Stop reason: HOSPADM

## 2021-09-10 RX ORDER — MAGNESIUM SULFATE 1 G/100ML
1 INJECTION INTRAVENOUS
Status: COMPLETED | OUTPATIENT
Start: 2021-09-10 | End: 2021-09-10

## 2021-09-10 RX ORDER — BENZONATATE 100 MG/1
200 CAPSULE ORAL EVERY 4 HOURS PRN
Status: DISCONTINUED | OUTPATIENT
Start: 2021-09-10 | End: 2021-09-16 | Stop reason: HOSPADM

## 2021-09-10 RX ORDER — FENTANYL/ROPIVACAINE/NS/PF 2-625MCG/1
15 PLASTIC BAG, INJECTION (ML) EPIDURAL
Status: COMPLETED | OUTPATIENT
Start: 2021-09-10 | End: 2021-09-10

## 2021-09-10 RX ADMIN — MAGNESIUM SULFATE HEPTAHYDRATE 1 G: 1 INJECTION, SOLUTION INTRAVENOUS at 10:50

## 2021-09-10 RX ADMIN — MIDODRINE HYDROCHLORIDE 5 MG: 2.5 TABLET ORAL at 13:10

## 2021-09-10 RX ADMIN — ENOXAPARIN SODIUM 40 MG: 40 INJECTION SUBCUTANEOUS at 09:53

## 2021-09-10 RX ADMIN — CEFEPIME HYDROCHLORIDE 2 G: 2 INJECTION, POWDER, FOR SOLUTION INTRAVENOUS at 09:49

## 2021-09-10 RX ADMIN — Medication 15 MMOL: at 13:03

## 2021-09-10 RX ADMIN — MAGNESIUM SULFATE HEPTAHYDRATE 1 G: 1 INJECTION, SOLUTION INTRAVENOUS at 13:03

## 2021-09-10 RX ADMIN — CEFEPIME HYDROCHLORIDE 2 G: 2 INJECTION, POWDER, FOR SOLUTION INTRAVENOUS at 17:18

## 2021-09-10 RX ADMIN — FUROSEMIDE 40 MG: 10 INJECTION, SOLUTION INTRAMUSCULAR; INTRAVENOUS at 09:54

## 2021-09-10 RX ADMIN — MIDODRINE HYDROCHLORIDE 5 MG: 2.5 TABLET ORAL at 09:54

## 2021-09-10 RX ADMIN — NICOTINE 1 PATCH: 21 PATCH, EXTENDED RELEASE TRANSDERMAL at 09:57

## 2021-09-10 RX ADMIN — BUDESONIDE 0.5 MG: 0.5 INHALANT ORAL at 19:16

## 2021-09-10 RX ADMIN — Medication 15 MMOL: at 10:44

## 2021-09-10 RX ADMIN — FAMOTIDINE 20 MG: 10 INJECTION INTRAVENOUS at 09:54

## 2021-09-10 RX ADMIN — MAGNESIUM OXIDE TAB 400 MG (241.3 MG ELEMENTAL MG) 400 MG: 400 (241.3 MG) TAB at 09:57

## 2021-09-10 RX ADMIN — ARFORMOTEROL TARTRATE 15 MCG: 15 SOLUTION RESPIRATORY (INHALATION) at 19:15

## 2021-09-10 RX ADMIN — MIDODRINE HYDROCHLORIDE 5 MG: 2.5 TABLET ORAL at 17:18

## 2021-09-10 RX ADMIN — MAGNESIUM SULFATE HEPTAHYDRATE 1 G: 1 INJECTION, SOLUTION INTRAVENOUS at 10:09

## 2021-09-10 RX ADMIN — FAMOTIDINE 20 MG: 10 INJECTION INTRAVENOUS at 22:13

## 2021-09-10 RX ADMIN — Medication 15 MMOL: at 14:58

## 2021-09-10 RX ADMIN — CEFEPIME HYDROCHLORIDE 2 G: 2 INJECTION, POWDER, FOR SOLUTION INTRAVENOUS at 01:59

## 2021-09-10 RX ADMIN — DEXAMETHASONE SODIUM PHOSPHATE 10 MG: 10 INJECTION INTRAMUSCULAR; INTRAVENOUS at 09:52

## 2021-09-10 RX ADMIN — MAGNESIUM SULFATE HEPTAHYDRATE 1 G: 1 INJECTION, SOLUTION INTRAVENOUS at 09:57

## 2021-09-10 RX ADMIN — MAGNESIUM OXIDE TAB 400 MG (241.3 MG ELEMENTAL MG) 400 MG: 400 (241.3 MG) TAB at 22:13

## 2021-09-10 RX ADMIN — FUROSEMIDE 40 MG: 10 INJECTION, SOLUTION INTRAMUSCULAR; INTRAVENOUS at 22:12

## 2021-09-10 NOTE — PLAN OF CARE
Goal Outcome Evaluation:     Pt off levophed, waiting on telemetry bed, safety sitter at bedside Yvette Velasquez RN

## 2021-09-10 NOTE — PROGRESS NOTES
[facility]   Hospitalist Progress Note  Date: 9/10/2021  Patient Name: Conrad Guerrier  : 1960  MRN: [id number]  Date of admission: 2021  Consultants:   -Pulmonology/Critical Care: Dr. Laz Uribe, Dr. Herbert Oliva    Subjective   Subjective     Chief Complaint: Shortness of breath    Summary:   Conrad Guerrier is a 61 y.o. male with past medical history significant for neuropathy, alcohol abuse who presented to ED with altered mental status.  Evaluation in the ED significant for patient requiring submental O2 to maintain O2 sats greater than 90%, chest x-ray obtained and demonstrated bilateral infiltrates consistent with multifocal pneumonia.  COVID-19 testing obtained and returned positive.  Patient had worsening hypoxia on morning of 2021 and required NIPPV.  Pulmonology consulted to assist in care.  Patient's respiratory status improved and patient's O2 requirement weaned as tolerated.  Treatment with Decadron, remdesivir, bronchodilators and cefepime continued during admission.  Patient went into SVT with heart rates in the 190s and started agonal breathing.  RRT was called and the patient was intubated and moved to the ICU on .  Patient able to be successfully extubated on 2021.  Patient did require Levophed but this was able to be weaned off successfully.    Interval Followup:   No acute events overnight.  Patient continues to be confused.  Patient's oxygen status has continued to improve and he is now on 4 L via nasal cannula.  Speech therapy following.  Nursing with no additional acute issues to report.    Antibiotics:   -Cefepime  -Vancomycin    Review of Systems   10 point review of systems performed and negative unless stated otherwise under subjective.    Objective   Objective     Vitals:   Temp:  [97.5 °F (36.4 °C)-98.7 °F (37.1 °C)] 98.7 °F (37.1 °C)  Heart Rate:  [] 97  Resp:  [16-28] 28  BP: ()/(51-95) 108/89  Flow (L/min):  [4-5] 4  Physical Exam   Gen:  No acute distress, altered, slow to answer questions  HEENT: MMM, diffuse bruising appreciated over patient's nose and forehead  Neck: Supple, Trachea midline  Resp: Good aeration, diminished breath sounds bilaterally, normal respiratory effort, equal chest rise bilaterally  Card: Tachycardia, regular rhythm, No m/r/g  Abd: Soft, Nontender, Nondistended, + bowel sounds  Ext: No cyanosis, No clubbing  Neuro: CN II-XII grossly intact, No focal deficits appreciated, not following commands, AAO x 3    Result Review    Result Review:  I have personally reviewed the results from the time of this admission to 9/10/2021 17:05 EDT and agree with these findings:  [x]  Laboratory: Phosphorus low on a.m. labs.  Renal function and other electrolytes stable. WBC at 15 and hemoglobin stable.  [x]  Microbiology: COVID-19 (09/04/2021): Positive  []  Radiology:   []  EKG/Telemetry:    []  Cardiology/Vascular:    []  Pathology:  []  Old records:  []  Other:    Assessment/Plan   Assessment / Plan     Assessment:  COVID-19 pneumonia  Acute hypoxic respiratory failure requiring intubation on 9/8  Viral pneumonia  Toxic/metabolic encephalopathy  Therapeutic drug level monitoring, remdesivir  Sinus tachycardia  Alcohol abuse  Hypomagnesemia  Hypokalemia  Alcohol withdrawal  Mechanical fall    Plan:  -Pulmonology/Critical Care consulted and following, appreciate assistance and recommendations in the care of this patient.  -Continue supplemental O2 to maintain sats greater than 90%, wean as tolerated  -Echocardiogram ordered, follow-up results  -Mild transaminitis, continue to trend LFTs daily  -Continue dexamethasone and plan to treat for total of 10 days (Day 6/10)  -MRSA swab negative, will discontinue vancomycin  -Continue cefepime, tailor antibiotics pending results of infectious work-up  -Continue nebulizers, bronchopulmonary hygiene protocol, since primary flutter valve  -Continue thiamine, multivitamin and folic acid  -Continue CIWA  protocol  -Monitor electrolytes and renal function with BMP and magnesium level in the AM  -Monitor WBC and Hgb with CBC in the AM  -Clinical course will dictate further management     DVT Prophylaxis: Lovenox  GI Prophylaxis: Pepcid  Diet: Speech to evaluate patient  Dispo: Transfer to monitored bed  Code Status: Full code     Personally reviewed patients labs and imaging, discussed with patient and nurse at bedside. Discussed case with the following consultants: Pulmonology/Critical Care.     Part of this note may be an electronic transcription/translation of spoken language to printed text using the Dragon dictation system.    DVT prophylaxis:  Medical DVT prophylaxis orders are present.    CODE STATUS:   Level Of Support Discussed With: Patient  Code Status: CPR  Medical Interventions (Level of Support Prior to Arrest): Full        Electronically signed by Odell Curtis MD, 09/10/21, 5:05 PM EDT.

## 2021-09-10 NOTE — PROGRESS NOTES
Pulmonary / Critical Care Progress Note      Patient Name: Conrad Guerrier  : 1960  MRN: [id number]  Attending:  Odell Curtis MD  Date of admission: 2021    Subjective   Subjective   Patient critically ill with respiratory failure  Extubated on , doing well on nasal cannula.  Patient complaining of cough overnight.  Still requiring low dose levophed      Review of Systems  Positive cough and weak voice      Objective   Objective     Vitals:   Temp:  [97 °F (36.1 °C)-98 °F (36.7 °C)] 98 °F (36.7 °C)  Heart Rate:  [] 111  Resp:  [22-28] 23  BP: ()/() 105/77  Flow (L/min):  [5] 5  FiO2 (%):  [35 %] 35 %    Physical Exam   Vital Signs Reviewed   General: WDWN male, Awake and Alert, NAD on room air    HEENT:  PERRL, EOMI.  OP, nares clear  Chest:  good aeration, lungs diminished bilaterally, tympanic to percussion bilaterally, no work of breathing noted  CV: RRR-, no MGR, pulses 2+, equal  Abd:  Soft, NT, ND, + BS, no HSM  EXT:  no clubbing, no cyanosis, no edema  Neuro:  A&Ox3, CN grossly intact, no focal deficits  Skin: No rashes or lesions noted    Result Review    Result Review:  I have personally reviewed the results from the time of this admission to 9/10/2021 06:27 EDT and agree with these findings:  [x]  Laboratory  [x]  Microbiology  [x]  Radiology  [x]  EKG/Telemetry   []  Cardiology/Vascular   []  Pathology  []  Old records  [x]  Other:    WBC 10.4, Cr 0.67, K+ 3.7, Mg+ 1.4    COVID LABS:  Results From Last 14 Days   Lab Units 21  2305 21  0417 21  0357 21  0630 21  0629 21  1454 21  0530 21  0136 21  2221 21  2214   PROBNP pg/mL 13,994.0*  --   --   --   --  3,971.0*  --   --   --   --    CRP mg/dL  --  6.02*  --   --  16.50*  --   --   --   --   --    D DIMER QUANT mg/L (FEU)  --  1.80*  --  1.36*  --   --   --   --   --   --    FERRITIN ng/mL  --  3,391.00*  --   --  6,926.00*  --   --   --   --   --     LACTATE mmol/L  --   --   --   --   --   --   --  2.0 3.6*  --    PROCALCITONIN ng/mL 0.59* 0.68* 0.99*  --   --   --    < >  --   --   --    TROPONIN T ng/mL  --   --   --   --   --   --   --   --   --  <0.010    < > = values in this interval not displayed.       Assessment/Plan   Assessment / Plan     Active Hospital Problems:  Active Hospital Problems    Diagnosis    • Suspected COVID-19 virus infection      Impression:  Acute hypoxemic respiratory failure  Covid-19 pneumonia  Altered mental status  Toxic/metabolic encephalopathy  Therapeutic drug monitoring of remdesivir  Alcohol use with concern for alcohol withdrawal  Fall outside of hospital    Plan:  Respiratory: Continue supplemental O2 to maintain O2 sats greater than 90% via nasal cannula. Continue breathing treatments with duonebs, brovana, and pulmicort. Will start on tessalon perles for continuous cough.    Cardiac: Patient still requiring levophed, will obtain ECHO and start proamatine today.       Gi: Failed speech evaluation, will place cortrak if unable to pass speech evaluation again today.    Renal: Renal function stable. Will replace electrolytes as clinically indicated. Replace mag and potassium phosphate.        Neuro: A/O, no visible withdrawals, speech not clear.       Endo: Sugars controlled we will start sliding scale insulin      Heme: Counts stable continue to monitor      ID: Continue remdesivir with daily LFTs and renal panel for toxic drug monitoring, continue IV Decadron; Will discontinue vanc due to negative MRSA; will continue cefepime      FEN: Speech evaluated after extubation and failed, will possibly add cortrak if patient fails speech again; replace magnesium and potassium phosphate. Will trend electrolytes and renal panel.     PPX: Lovenox for DVT prophylaxis and Pepcid         DVT prophylaxis:  Medical DVT prophylaxis orders are present.    CODE STATUS:   Level Of Support Discussed With: Patient  Code Status: CPR  Medical  Interventions (Level of Support Prior to Arrest): Full  Patient is critically ill in ICU with  respiratory failure and shock due to Covid pneumonia  I spent 31 minutes of critical care time excluding any procedure notes, spent in review, analysis, obtaining history and physical, formulating care plan, and I lead multi-disciplinary critical care rounds with bedside nurse, respiratory therapist, clinical pharmacist and other allied services. I have discussed the base with primary service and other consultants as well    Electronically signed by Laz Uribe DO, 09/10/21, 6:27 AM EDT.

## 2021-09-10 NOTE — THERAPY TREATMENT NOTE
Acute Care - Speech Language Pathology   Swallow Treatment Note TONYA [facility]     Patient Name: Conrad Guerrier  : 1960  MRN: [id number]  Today's Date: 9/10/2021               Admit Date: 2021    Visit Dx:     ICD-10-CM ICD-9-CM   1. Suspected COVID-19 virus infection  Z20.822 V01.79   2. Pneumonia due to infectious organism, unspecified laterality, unspecified part of lung  J18.9 486   3. Acute respiratory failure with hypoxia (CMS/[facility])  J96.01 518.81   4. Alcohol dependence with unspecified alcohol-induced disorder (CMS/[facility])  F10.29 303.90     291.9   5. Hypomagnesemia  E83.42 275.2     Patient Active Problem List   Diagnosis   • Suspected COVID-19 virus infection     History reviewed. No pertinent past medical history.  Past Surgical History:   Procedure Laterality Date   • ABDOMINAL SURGERY     Subjective/Behavioral Observations: Patient seen for dysphagia therapy  Current Diet: Patient seen at bedside patient currently has regular diet ordered but holding tray  Current Strategies: Tactile stimulation    Treatment received: Patient seen at bedside.  Oral care had just been completed.  Patient seated upright.  Patient provided with p.o. trials of spoonfed purée consistencies, nectar thick liquids.  Patient with reduced laryngeal elevation per palpation.  Increased laryngeal congestion noted with p.o. trials.  Patient is quite drowsy and required repeated stimulation to increase alertness.  Discussed possible need for return tube feeds however patient stated he did not want that.    Results of treatment: As stated    Progress toward goals: As stated    Barriers to Achieving goals: Medical status    Plan of care:/changes in plan: Unable to recommend p.o. diet at this time.  Patient stating he does not want tube feedings.  SLP will follow up later today and reassess readiness for p.o. intake if patient's alertness has improved.        EDUCATION  The patient has been educated in the following areas:   NPO  rationale.              Rachell Meneses, SLP  9/10/2021

## 2021-09-10 NOTE — THERAPY TREATMENT NOTE
Acute Care - Speech Language Pathology   Swallow Treatment Note  [place]     Patient Name: Conrad Guerrier  : 1960  MRN: [id number]  Today's Date: 9/10/2021               Admit Date: 2021    Visit Dx:     ICD-10-CM ICD-9-CM   1. Suspected COVID-19 virus infection  Z20.822 V01.79   2. Pneumonia due to infectious organism, unspecified laterality, unspecified part of lung  J18.9 486   3. Acute respiratory failure with hypoxia (CMS/[facility])  J96.01 518.81   4. Alcohol dependence with unspecified alcohol-induced disorder (CMS/[facility])  F10.29 303.90     291.9   5. Hypomagnesemia  E83.42 275.2     Patient Active Problem List   Diagnosis   • Suspected COVID-19 virus infection     History reviewed. No pertinent past medical history.  Past Surgical History:   Procedure Laterality Date   • ABDOMINAL SURGERY       Subjective/Behavioral Observations: Patient reassessed at bedside.     Current Diet: Regular diet    Current Strategies:Single sips, small bites/drinks    Treatment received:Patient seen at bedside. More awake/alert.  Voicing is stronger.  Therapeutic trials of thin, nectar, puree consistencies completed.  Swallow completed without s/s of aspiration.  Vocal quality clear to auscultation.  Appears to have improved laryngeal elevation.  Denies globus sensation.      Results of treatment:Improved    Progress toward goals: As stated    Barriers to Achieving goals: Medical status    Plan of care:/changes in plan: Cautiously initiate full liquid diet, 90 degrees upright, small bites/drinks, oral meds in applesauce (crush if needed), feed only when awake/alert, discontinue meal if resp rate is above 30.  Supervision with meals.                 EDUCATION  The patient has been educated in the following areas:   Dysphagia (Swallowing Impairment) Modified Diet Instruction.                Rachell Meneses, SLP  9/10/2021

## 2021-09-11 LAB
ALBUMIN SERPL-MCNC: 2.5 G/DL (ref 3.5–5.2)
ALBUMIN/GLOB SERPL: 0.9 G/DL
ALP SERPL-CCNC: 386 U/L (ref 39–117)
ALT SERPL W P-5'-P-CCNC: 36 U/L (ref 1–41)
ANION GAP SERPL CALCULATED.3IONS-SCNC: 11.8 MMOL/L (ref 5–15)
AST SERPL-CCNC: 68 U/L (ref 1–40)
BILIRUB SERPL-MCNC: 1 MG/DL (ref 0–1.2)
BUN SERPL-MCNC: 23 MG/DL (ref 8–23)
BUN/CREAT SERPL: 44.2 (ref 7–25)
CALCIUM SPEC-SCNC: 7.9 MG/DL (ref 8.6–10.5)
CHLORIDE SERPL-SCNC: 98 MMOL/L (ref 98–107)
CO2 SERPL-SCNC: 26.2 MMOL/L (ref 22–29)
CREAT SERPL-MCNC: 0.52 MG/DL (ref 0.76–1.27)
DEPRECATED RDW RBC AUTO: 51.4 FL (ref 37–54)
ERYTHROCYTE [DISTWIDTH] IN BLOOD BY AUTOMATED COUNT: 16 % (ref 12.3–15.4)
GFR SERPL CREATININE-BSD FRML MDRD: >150 ML/MIN/1.73
GLOBULIN UR ELPH-MCNC: 2.9 GM/DL
GLUCOSE SERPL-MCNC: 179 MG/DL (ref 65–99)
HCT VFR BLD AUTO: 31.6 % (ref 37.5–51)
HGB BLD-MCNC: 11.8 G/DL (ref 13–17.7)
L PNEUMO1 AG UR QL IA: NEGATIVE
MAGNESIUM SERPL-MCNC: 1.7 MG/DL (ref 1.6–2.4)
MCH RBC QN AUTO: 34.6 PG (ref 26.6–33)
MCHC RBC AUTO-ENTMCNC: 37.3 G/DL (ref 31.5–35.7)
MCV RBC AUTO: 92.7 FL (ref 79–97)
PLATELET # BLD AUTO: 246 10*3/MM3 (ref 140–450)
PMV BLD AUTO: 13.5 FL (ref 6–12)
POTASSIUM SERPL-SCNC: 4.2 MMOL/L (ref 3.5–5.2)
PROT SERPL-MCNC: 5.4 G/DL (ref 6–8.5)
RBC # BLD AUTO: 3.41 10*6/MM3 (ref 4.14–5.8)
S PNEUM AG SPEC QL LA: NEGATIVE
SODIUM SERPL-SCNC: 136 MMOL/L (ref 136–145)
WBC # BLD AUTO: 10.92 10*3/MM3 (ref 3.4–10.8)

## 2021-09-11 PROCEDURE — 83735 ASSAY OF MAGNESIUM: CPT | Performed by: INTERNAL MEDICINE

## 2021-09-11 PROCEDURE — 99233 SBSQ HOSP IP/OBS HIGH 50: CPT | Performed by: INTERNAL MEDICINE

## 2021-09-11 PROCEDURE — 85027 COMPLETE CBC AUTOMATED: CPT | Performed by: INTERNAL MEDICINE

## 2021-09-11 PROCEDURE — 94799 UNLISTED PULMONARY SVC/PX: CPT

## 2021-09-11 PROCEDURE — 25010000002 DEXAMETHASONE PER 1 MG: Performed by: INTERNAL MEDICINE

## 2021-09-11 PROCEDURE — 25010000002 FUROSEMIDE PER 20 MG: Performed by: INTERNAL MEDICINE

## 2021-09-11 PROCEDURE — 94760 N-INVAS EAR/PLS OXIMETRY 1: CPT

## 2021-09-11 PROCEDURE — 25010000002 ENOXAPARIN PER 10 MG: Performed by: INTERNAL MEDICINE

## 2021-09-11 PROCEDURE — 99232 SBSQ HOSP IP/OBS MODERATE 35: CPT | Performed by: INTERNAL MEDICINE

## 2021-09-11 PROCEDURE — 25010000002 CEFEPIME PER 500 MG: Performed by: INTERNAL MEDICINE

## 2021-09-11 PROCEDURE — 80053 COMPREHEN METABOLIC PANEL: CPT | Performed by: INTERNAL MEDICINE

## 2021-09-11 RX ORDER — FUROSEMIDE 10 MG/ML
40 INJECTION INTRAMUSCULAR; INTRAVENOUS
Status: DISCONTINUED | OUTPATIENT
Start: 2021-09-11 | End: 2021-09-16 | Stop reason: HOSPADM

## 2021-09-11 RX ADMIN — MIDODRINE HYDROCHLORIDE 5 MG: 2.5 TABLET ORAL at 14:00

## 2021-09-11 RX ADMIN — FAMOTIDINE 20 MG: 10 INJECTION INTRAVENOUS at 20:29

## 2021-09-11 RX ADMIN — ARFORMOTEROL TARTRATE 15 MCG: 15 SOLUTION RESPIRATORY (INHALATION) at 20:32

## 2021-09-11 RX ADMIN — FUROSEMIDE 40 MG: 10 INJECTION, SOLUTION INTRAMUSCULAR; INTRAVENOUS at 17:18

## 2021-09-11 RX ADMIN — DEXAMETHASONE SODIUM PHOSPHATE 10 MG: 10 INJECTION INTRAMUSCULAR; INTRAVENOUS at 09:43

## 2021-09-11 RX ADMIN — NICOTINE 1 PATCH: 21 PATCH, EXTENDED RELEASE TRANSDERMAL at 09:42

## 2021-09-11 RX ADMIN — BUDESONIDE 0.5 MG: 0.5 INHALANT ORAL at 20:32

## 2021-09-11 RX ADMIN — FUROSEMIDE 40 MG: 10 INJECTION, SOLUTION INTRAMUSCULAR; INTRAVENOUS at 09:43

## 2021-09-11 RX ADMIN — CEFEPIME HYDROCHLORIDE 2 G: 2 INJECTION, POWDER, FOR SOLUTION INTRAVENOUS at 17:18

## 2021-09-11 RX ADMIN — CEFEPIME HYDROCHLORIDE 2 G: 2 INJECTION, POWDER, FOR SOLUTION INTRAVENOUS at 01:45

## 2021-09-11 RX ADMIN — CEFEPIME HYDROCHLORIDE 2 G: 2 INJECTION, POWDER, FOR SOLUTION INTRAVENOUS at 09:43

## 2021-09-11 RX ADMIN — FAMOTIDINE 20 MG: 10 INJECTION INTRAVENOUS at 09:43

## 2021-09-11 RX ADMIN — ARFORMOTEROL TARTRATE 15 MCG: 15 SOLUTION RESPIRATORY (INHALATION) at 07:31

## 2021-09-11 RX ADMIN — MIDODRINE HYDROCHLORIDE 5 MG: 2.5 TABLET ORAL at 17:18

## 2021-09-11 RX ADMIN — MAGNESIUM OXIDE TAB 400 MG (241.3 MG ELEMENTAL MG) 400 MG: 400 (241.3 MG) TAB at 20:29

## 2021-09-11 RX ADMIN — SODIUM CHLORIDE, PRESERVATIVE FREE 10 ML: 5 INJECTION INTRAVENOUS at 09:42

## 2021-09-11 RX ADMIN — MAGNESIUM OXIDE TAB 400 MG (241.3 MG ELEMENTAL MG) 400 MG: 400 (241.3 MG) TAB at 09:42

## 2021-09-11 RX ADMIN — BUDESONIDE 0.5 MG: 0.5 INHALANT ORAL at 07:31

## 2021-09-11 RX ADMIN — MIDODRINE HYDROCHLORIDE 5 MG: 2.5 TABLET ORAL at 09:42

## 2021-09-11 RX ADMIN — ENOXAPARIN SODIUM 40 MG: 40 INJECTION SUBCUTANEOUS at 09:42

## 2021-09-11 NOTE — PROGRESS NOTES
[facility]   Hospitalist Progress Note  Date: 2021  Patient Name: Conrad Guerrier  : 1960  MRN: [id number]  Date of admission: 2021  Consultants:   -Pulmonology/Critical Care: Dr. Laz Uribe, Dr. Herbert Oliva    Subjective   Subjective     Chief Complaint: Shortness of breath    Summary:   Conrad Guerrier is a 61 y.o. male with past medical history significant for neuropathy, alcohol abuse who presented to ED with altered mental status.  Evaluation in the ED significant for patient requiring submental O2 to maintain O2 sats greater than 90%, chest x-ray obtained and demonstrated bilateral infiltrates consistent with multifocal pneumonia.  COVID-19 testing obtained and returned positive.  Patient had worsening hypoxia on morning of 2021 and required NIPPV.  Pulmonology consulted to assist in care.  Patient's respiratory status improved and patient's O2 requirement weaned as tolerated.  Treatment with Decadron, remdesivir, bronchodilators and cefepime continued during admission.  Patient went into SVT with heart rates in the 190s and started agonal breathing.  RRT was called and the patient was intubated and moved to the ICU on .  Patient able to be successfully extubated on 2021.  Patient did require Levophed but this was able to be weaned off successfully.    Interval Followup:   No acute events overnight.  Patient maintaining O2 sats on 4 L nasal cannula.  Continues to be confused and hard to understand his speech at times.  Answers questions appropriately but slowly.  Denies chest pain, abdominal pain, nausea or vomiting.  Blood pressure improved.  Nursing with no additional acute issues to report.    Antibiotics:   -Cefepime  -Vancomycin    Review of Systems   10 point review of systems performed and negative unless stated otherwise under subjective.    Objective   Objective     Vitals:   Temp:  [97.3 °F (36.3 °C)-98.7 °F (37.1 °C)] 97.6 °F (36.4 °C)  Heart Rate:  []  97  Resp:  [16-28] 18  BP: ()/(65-90) 91/68  Flow (L/min):  [4-5] 5  Physical Exam   Gen: No acute distress, altered, slow to answer questions  HEENT: MMM, diffuse bruising appreciated over patient's nose and forehead  Neck: Supple, Trachea midline  Resp: Good aeration, diminished breath sounds bilaterally, normal respiratory effort, equal chest rise bilaterally  Card: Tachycardia, regular rhythm, No m/r/g  Abd: Soft, Nontender, Nondistended, + bowel sounds  Ext: No cyanosis, No clubbing  Neuro: CN II-XII grossly intact, No focal deficits appreciated, AAO x 3    Result Review    Result Review:  I have personally reviewed the results from the time of this admission to 9/11/2021 14:58 EDT and agree with these findings:  [x]  Laboratory: Calcium low.  Other electrolytes and creatinine stable.  WBC improved.  Hemoglobin stable.  [x]  Microbiology: Strep pneumo and Legionella urinary antigens negative  []  Radiology:   [x]  EKG/Telemetry: No acute events.  Sinus rhythm.  Bradycardia.[]  Cardiology/Vascular:    []  Pathology:  []  Old records:  []  Other:    Assessment/Plan   Assessment / Plan     Assessment:  COVID-19 pneumonia  Acute hypoxic respiratory failure requiring intubation on 9/8  Viral pneumonia  Toxic/metabolic encephalopathy  Therapeutic drug level monitoring, remdesivir  Sinus tachycardia  Alcohol abuse  Hypomagnesemia  Hypokalemia  Alcohol withdrawal  Mechanical fall    Plan:  -Pulmonology/Critical Care consulted and following, appreciate assistance and recommendations in the care of this patient.  -Continue supplemental O2 to maintain sats greater than 90%, wean as tolerated  -Echocardiogram showed normal left ventricular systolic function and no significant valvular abnormalities.  -Mild transaminitis, continue to trend LFTs daily  -Continue dexamethasone and plan to treat for total of 10 days (Day 7/10)  -Continue cefepime, plan to complete 7 days (Day 7/7)  -Continue nebulizers, bronchopulmonary  hygiene protocol, since primary flutter valve  -Continue thiamine, multivitamin and folic acid  -Continue CIWA protocol  -Will monitor electrolytes and renal function with BMP and magnesium level in the AM  -Will monitor WBC and Hgb with CBC in the AM  -Clinical course will dictate further management     DVT Prophylaxis: Lovenox  GI Prophylaxis: Pepcid  Diet: Full liquid  Dispo: PT/OT consulted  Code Status: Full code     Personally reviewed patients labs and imaging, discussed with patient and nurse at bedside. Discussed case with the following consultants: Pulmonology/Critical Care.     Part of this note may be an electronic transcription/translation of spoken language to printed text using the Dragon dictation system.    DVT prophylaxis:  Medical DVT prophylaxis orders are present.    CODE STATUS:   Level Of Support Discussed With: Patient  Code Status: CPR  Medical Interventions (Level of Support Prior to Arrest): Full      Electronically signed by Odell Curtis MD, 09/11/21, 2:58 PM EDT.

## 2021-09-11 NOTE — PROGRESS NOTES
Pulmonary / Critical Care Progress Note      Patient Name: Conrad Guerrier  : 1960  MRN: [id number]  Attending:  Odell Curtis MD  Date of admission: 2021    Subjective   Subjective   Follow-up for COVID-19     21--> COVID+, Completed 5 day course of Remdesivir.  21-->intubated  21-->extubated     Over the last 24 hours, transferred out of ICU.  Has a safety sitter due to confusion.  Continues on antibiotics, steroids, diuretics, and nebulizers.     No acute events overnight.     This morning,  On 4L with O2 sats 94%  Decreased to 3L NC  Confused  Hard to understand speech at times  Non-productive cough  Dyspnea improved  No wheezing  No chest pain  No fevers or chills  BP improved  Safety sitter at bedside     Review of Systems  General: + fatigue and weakness, otherwise denied complaints  Cardiovascular:  Denied complaints  Respiratory dyspnea, cough, otherwise denied complaints  Gastrointestinal: Denied complaints    Objective   Objective     Vitals:   Temp:  [97.3 °F (36.3 °C)-98.7 °F (37.1 °C)] 97.3 °F (36.3 °C)  Heart Rate:  [] 96  Resp:  [16-28] 18  BP: ()/(65-90) 106/71  Flow (L/min):  [4] 4    Physical Exam   Vital Signs Reviewed   General: WDWN male, Awake and Alert, NAD on 3L NC  HEENT:  PERRL, EOMI.  OP, nares clear  Chest:  good aeration, lungs diminished bilaterally, tympanic to percussion bilaterally, no work of breathing noted  CV: RRR, no MGR, pulses 2+, equal  Abd:  Soft, NT, ND, + BS, no HSM  EXT:  no clubbing, no cyanosis, no edema  Neuro:  A&Ox3, intermittently confused, CN grossly intact, no focal deficits  Skin: No rashes or lesions noted, face with bruising across nose and eyes from fall prior to hospitalization      Result Review    Result Review:  I have personally reviewed the results from the time of this admission to 2021 10:51 EDT and agree with these findings:  [x]  Laboratory  [x]  Microbiology  [x]  Radiology  [x]  EKG/Telemetry   []   Cardiology/Vascular   []  Pathology  []  Old records  [x]  Other:    WBC 10.92, Cr 0.52, K+ 4.2, Mg+ 1.7    Echo with EF 50%, limited study    COVID LABS:  Results From Last 14 Days   Lab Units 09/08/21  2305 09/08/21  0417 09/07/21  0357 09/06/21  0630 09/06/21  0629 09/05/21  1454 09/05/21  0530 09/05/21  0136 09/04/21  2221 09/04/21  2214   PROBNP pg/mL 13,994.0*  --   --   --   --  3,971.0*  --   --   --   --    CRP mg/dL  --  6.02*  --   --  16.50*  --   --   --   --   --    D DIMER QUANT mg/L (FEU)  --  1.80*  --  1.36*  --   --   --   --   --   --    FERRITIN ng/mL  --  3,391.00*  --   --  6,926.00*  --   --   --   --   --    LACTATE mmol/L  --   --   --   --   --   --   --  2.0 3.6*  --    PROCALCITONIN ng/mL 0.59* 0.68* 0.99*  --   --   --    < >  --   --   --    TROPONIN T ng/mL  --   --   --   --   --   --   --   --   --  <0.010    < > = values in this interval not displayed.       Assessment/Plan   Assessment / Plan     Active Hospital Problems:  Active Hospital Problems    Diagnosis    • Suspected COVID-19 virus infection      Impression:  Acute hypoxemic respiratory failure  Covid-19 pneumonia  Altered mental status  Toxic/metabolic encephalopathy  Therapeutic drug monitoring of remdesivir  Alcohol use with concern for alcohol withdrawal  Fall outside of hospital    Plan:  Continue to wean O2 to keep sats >90%.  Completed 5 day course of Remdesivir.  Continue daily CMP for therapeutic drug monitoring.  Continue to trend inflammatory markers.  Continue Decadron 10 mg IV daily for 10 days.  Continue nebulizers and bronchopulmonary hygiene.  Continue IS and flutter valve.  Encourage to prone or lay side to side.  Otherwise, get patient out of bed and into chair.  Finish 7 days of cefepime  Echo with EF 60% and limited study.  Will start Lasix IV BID.  Monitor renal function and electrolytes.  Continue Proamatine TID  Speech following and appreciate their recommendations.  Continue CIWA protocol.  DC  central line and place peripheral IV.     DVT prophylaxis:  Medical DVT prophylaxis orders are present.    CODE STATUS:   Level Of Support Discussed With: Patient  Code Status: CPR  Medical Interventions (Level of Support Prior to Arrest): Full    Labs, imaging, notes, microbiology and medications personally reviewed.  Discussed with primary and bedside RN.     Electronically signed by LESIA Gale, 09/11/21, 10:15 AM EDT.  Electronically signed by Herbert Oliva MD, 09/11/21, 10:51 AM EDT.

## 2021-09-11 NOTE — PLAN OF CARE
Problem: Adult Inpatient Plan of Care  Goal: Plan of Care Review  Outcome: Ongoing, Progressing  Flowsheets (Taken 9/11/2021 0251)  Progress: no change  Plan of Care Reviewed With: patient  Outcome Summary: Pt transfered to step down unit. Restraints D/C'd and sitter with patient. Pt is confused, but pleasent. New CVC dressing applied. No acute changes noted at this time, will continue to monitor.  Goal: Patient-Specific Goal (Individualized)  Outcome: Ongoing, Progressing  Goal: Absence of Hospital-Acquired Illness or Injury  Outcome: Ongoing, Progressing  Intervention: Identify and Manage Fall Risk  Recent Flowsheet Documentation  Taken 9/11/2021 0200 by Thorn, Erinne, RN  Safety Promotion/Fall Prevention: safety round/check completed  Taken 9/11/2021 0000 by Thorn, Erinne, RN  Safety Promotion/Fall Prevention: safety round/check completed  Taken 9/10/2021 2200 by Thorn, Erinne, RN  Safety Promotion/Fall Prevention: safety round/check completed  Intervention: Prevent Skin Injury  Recent Flowsheet Documentation  Taken 9/10/2021 2000 by Thorn, Erinne, RN  Skin Protection: skin sealant/moisture barrier applied  Intervention: Prevent and Manage VTE (venous thromboembolism) Risk  Recent Flowsheet Documentation  Taken 9/10/2021 2000 by Thorn, Erinne, RN  VTE Prevention/Management: (see MAR) other (see comments)  Intervention: Prevent Infection  Recent Flowsheet Documentation  Taken 9/11/2021 0200 by Thorn, Erinne, RN  Infection Prevention:   single patient room provided   rest/sleep promoted   hand hygiene promoted  Taken 9/11/2021 0000 by Thorn, Erinne, RN  Infection Prevention:   single patient room provided   rest/sleep promoted   hand hygiene promoted  Taken 9/10/2021 2200 by Thorn, Erinne, RN  Infection Prevention:   single patient room provided   rest/sleep promoted   hand hygiene promoted  Taken 9/10/2021 2000 by Thorn, Erinne, RN  Infection Prevention:   single patient room provided   rest/sleep promoted    hand hygiene promoted  Goal: Optimal Comfort and Wellbeing  Outcome: Ongoing, Progressing  Intervention: Provide Person-Centered Care  Recent Flowsheet Documentation  Taken 9/10/2021 2000 by Thorn, Erinne, RN  Trust Relationship/Rapport:   care explained   reassurance provided  Goal: Readiness for Transition of Care  Outcome: Ongoing, Progressing     Problem: Fluid Imbalance (Pneumonia)  Goal: Fluid Balance  Outcome: Ongoing, Progressing     Problem: Infection (Pneumonia)  Goal: Resolution of Infection Signs and Symptoms  Outcome: Ongoing, Progressing  Intervention: Prevent Infection Progression  Recent Flowsheet Documentation  Taken 9/11/2021 0200 by Thorn, Erinne, RN  Isolation Precautions: airborne precautions maintained  Taken 9/11/2021 0000 by Thorn, Erinne, RN  Isolation Precautions: airborne precautions maintained  Taken 9/10/2021 2200 by Thorn, Erinne, RN  Isolation Precautions: airborne precautions maintained  Taken 9/10/2021 2000 by Thorn, Erinne, RN  Isolation Precautions: airborne precautions maintained     Problem: Respiratory Compromise (Pneumonia)  Goal: Effective Oxygenation and Ventilation  Outcome: Ongoing, Progressing  Intervention: Promote Airway Secretion Clearance  Recent Flowsheet Documentation  Taken 9/10/2021 2000 by Thorn, Erinne, RN  Cough And Deep Breathing: done independently per patient  Intervention: Optimize Oxygenation and Ventilation  Recent Flowsheet Documentation  Taken 9/10/2021 2000 by Thorn, Erinne, RN  Airway/Ventilation Management: airway patency maintained     Problem: Skin Injury Risk Increased  Goal: Skin Health and Integrity  Outcome: Ongoing, Progressing  Intervention: Optimize Skin Protection  Recent Flowsheet Documentation  Taken 9/10/2021 2000 by Thorn, Erinne, RN  Pressure Reduction Techniques:   frequent weight shift encouraged   heels elevated off bed  Pressure Reduction Devices: positioning supports utilized  Skin Protection: skin sealant/moisture barrier  applied     Problem: Fall Injury Risk  Goal: Absence of Fall and Fall-Related Injury  Outcome: Ongoing, Progressing  Intervention: Identify and Manage Contributors to Fall Injury Risk  Recent Flowsheet Documentation  Taken 9/11/2021 0200 by Thorn, Erinne, RN  Medication Review/Management: medications reviewed  Taken 9/11/2021 0000 by Thorn, Erinne, RN  Medication Review/Management: medications reviewed  Taken 9/10/2021 2200 by Thorn, Erinne, RN  Medication Review/Management: medications reviewed  Taken 9/10/2021 2000 by Thorn, Erinne, RN  Medication Review/Management: medications reviewed  Self-Care Promotion: independence encouraged  Intervention: Promote Injury-Free Environment  Recent Flowsheet Documentation  Taken 9/11/2021 0200 by Thorn, Erinne, RN  Safety Promotion/Fall Prevention: safety round/check completed  Taken 9/11/2021 0000 by Thorn, Erinne, RN  Safety Promotion/Fall Prevention: safety round/check completed  Taken 9/10/2021 2200 by Thorn, Erinne, RN  Safety Promotion/Fall Prevention: safety round/check completed     Problem: Noninvasive Ventilation Acute  Goal: Effective Unassisted Ventilation and Oxygenation  Outcome: Ongoing, Progressing  Intervention: Monitor and Manage Noninvasive Ventilation  Recent Flowsheet Documentation  Taken 9/10/2021 2000 by Thorn, Erinne, RN  Airway/Ventilation Management: airway patency maintained     Problem: Restraint, Nonbehavioral (Nonviolent)  Goal: Discontinuation Criteria Achieved  Outcome: Ongoing, Progressing  Intervention: Implement Least-restrictive Safety Strategies  Recent Flowsheet Documentation  Taken 9/10/2021 2000 by Thorn, Erinne, RN  Diversional Activities: television  Goal: Personal Dignity and Safety Maintained  Outcome: Ongoing, Progressing  Intervention: Protect Dignity, Rights, and Personal Wellbeing  Recent Flowsheet Documentation  Taken 9/10/2021 2000 by Thorn, Erinne, RN  Trust Relationship/Rapport:   care explained   reassurance  provided  Intervention: Protect Skin and Joint Integrity  Recent Flowsheet Documentation  Taken 9/10/2021 2000 by Thorn, Erinne, RN  Range of Motion:   active ROM (range of motion) encouraged   ROM (range of motion) performed   Goal Outcome Evaluation:  Plan of Care Reviewed With: patient        Progress: no change  Outcome Summary: Pt transfered to step down unit. Restraints D/C'd and sitter with patient. Pt is confused, but pleasent. New CVC dressing applied. No acute changes noted at this time, will continue to monitor.

## 2021-09-11 NOTE — PLAN OF CARE
Goal Outcome Evaluation:   Patient on 5L NC, confused throughout the day, male external catheter applied due to diuretics and potential for skin breakdown, no other changes.

## 2021-09-12 LAB
ALBUMIN SERPL-MCNC: 2.8 G/DL (ref 3.5–5.2)
ALBUMIN/GLOB SERPL: 0.9 G/DL
ALP SERPL-CCNC: 394 U/L (ref 39–117)
ALT SERPL W P-5'-P-CCNC: 44 U/L (ref 1–41)
ANION GAP SERPL CALCULATED.3IONS-SCNC: 8.8 MMOL/L (ref 5–15)
ANISOCYTOSIS BLD QL: NORMAL
AST SERPL-CCNC: 65 U/L (ref 1–40)
BASOPHILS # BLD AUTO: 0.07 10*3/MM3 (ref 0–0.2)
BASOPHILS NFR BLD AUTO: 0.6 % (ref 0–1.5)
BILIRUB SERPL-MCNC: 1.1 MG/DL (ref 0–1.2)
BUN SERPL-MCNC: 26 MG/DL (ref 8–23)
BUN/CREAT SERPL: 54.2 (ref 7–25)
CALCIUM SPEC-SCNC: 9 MG/DL (ref 8.6–10.5)
CHLORIDE SERPL-SCNC: 96 MMOL/L (ref 98–107)
CO2 SERPL-SCNC: 28.2 MMOL/L (ref 22–29)
CREAT SERPL-MCNC: 0.48 MG/DL (ref 0.76–1.27)
CRP SERPL-MCNC: 3.54 MG/DL (ref 0–0.5)
D DIMER PPP FEU-MCNC: 1.26 MG/L (FEU) (ref 0–0.59)
DEPRECATED RDW RBC AUTO: 51.3 FL (ref 37–54)
EOSINOPHIL # BLD AUTO: 0.04 10*3/MM3 (ref 0–0.4)
EOSINOPHIL NFR BLD AUTO: 0.3 % (ref 0.3–6.2)
ERYTHROCYTE [DISTWIDTH] IN BLOOD BY AUTOMATED COUNT: 15.9 % (ref 12.3–15.4)
FERRITIN SERPL-MCNC: 2513 NG/ML (ref 30–400)
GFR SERPL CREATININE-BSD FRML MDRD: >150 ML/MIN/1.73
GLOBULIN UR ELPH-MCNC: 3 GM/DL
GLUCOSE SERPL-MCNC: 153 MG/DL (ref 65–99)
HCT VFR BLD AUTO: 33.8 % (ref 37.5–51)
HGB BLD-MCNC: 12.3 G/DL (ref 13–17.7)
IMM GRANULOCYTES # BLD AUTO: 0.55 10*3/MM3 (ref 0–0.05)
IMM GRANULOCYTES NFR BLD AUTO: 4.7 % (ref 0–0.5)
LARGE PLATELETS: NORMAL
LYMPHOCYTES # BLD AUTO: 1.93 10*3/MM3 (ref 0.7–3.1)
LYMPHOCYTES NFR BLD AUTO: 16.6 % (ref 19.6–45.3)
MACROCYTES BLD QL SMEAR: NORMAL
MAGNESIUM SERPL-MCNC: 1.5 MG/DL (ref 1.6–2.4)
MCH RBC QN AUTO: 33.2 PG (ref 26.6–33)
MCHC RBC AUTO-ENTMCNC: 36.4 G/DL (ref 31.5–35.7)
MCV RBC AUTO: 91.1 FL (ref 79–97)
MONOCYTES # BLD AUTO: 1.07 10*3/MM3 (ref 0.1–0.9)
MONOCYTES NFR BLD AUTO: 9.2 % (ref 5–12)
NEUTROPHILS NFR BLD AUTO: 68.6 % (ref 42.7–76)
NEUTROPHILS NFR BLD AUTO: 8 10*3/MM3 (ref 1.7–7)
NRBC BLD AUTO-RTO: 0 /100 WBC (ref 0–0.2)
PLATELET # BLD AUTO: 282 10*3/MM3 (ref 140–450)
PMV BLD AUTO: 13.5 FL (ref 6–12)
POTASSIUM SERPL-SCNC: 4.6 MMOL/L (ref 3.5–5.2)
PROT SERPL-MCNC: 5.8 G/DL (ref 6–8.5)
RBC # BLD AUTO: 3.71 10*6/MM3 (ref 4.14–5.8)
SMALL PLATELETS BLD QL SMEAR: ADEQUATE
SODIUM SERPL-SCNC: 133 MMOL/L (ref 136–145)
TARGETS BLD QL SMEAR: NORMAL
WBC # BLD AUTO: 11.66 10*3/MM3 (ref 3.4–10.8)
WBC MORPH BLD: NORMAL

## 2021-09-12 PROCEDURE — 83735 ASSAY OF MAGNESIUM: CPT | Performed by: NURSE PRACTITIONER

## 2021-09-12 PROCEDURE — 85379 FIBRIN DEGRADATION QUANT: CPT | Performed by: NURSE PRACTITIONER

## 2021-09-12 PROCEDURE — 80053 COMPREHEN METABOLIC PANEL: CPT | Performed by: NURSE PRACTITIONER

## 2021-09-12 PROCEDURE — 85025 COMPLETE CBC W/AUTO DIFF WBC: CPT | Performed by: NURSE PRACTITIONER

## 2021-09-12 PROCEDURE — 25010000002 ONDANSETRON PER 1 MG: Performed by: INTERNAL MEDICINE

## 2021-09-12 PROCEDURE — 94760 N-INVAS EAR/PLS OXIMETRY 1: CPT

## 2021-09-12 PROCEDURE — 82728 ASSAY OF FERRITIN: CPT | Performed by: NURSE PRACTITIONER

## 2021-09-12 PROCEDURE — 85007 BL SMEAR W/DIFF WBC COUNT: CPT | Performed by: NURSE PRACTITIONER

## 2021-09-12 PROCEDURE — 25010000002 ENOXAPARIN PER 10 MG: Performed by: INTERNAL MEDICINE

## 2021-09-12 PROCEDURE — 25010000002 DEXAMETHASONE PER 1 MG: Performed by: INTERNAL MEDICINE

## 2021-09-12 PROCEDURE — 94799 UNLISTED PULMONARY SVC/PX: CPT

## 2021-09-12 PROCEDURE — 25010000002 CEFEPIME PER 500 MG: Performed by: INTERNAL MEDICINE

## 2021-09-12 PROCEDURE — 86140 C-REACTIVE PROTEIN: CPT | Performed by: NURSE PRACTITIONER

## 2021-09-12 PROCEDURE — 99233 SBSQ HOSP IP/OBS HIGH 50: CPT | Performed by: INTERNAL MEDICINE

## 2021-09-12 PROCEDURE — 99232 SBSQ HOSP IP/OBS MODERATE 35: CPT | Performed by: INTERNAL MEDICINE

## 2021-09-12 PROCEDURE — 25010000002 MAGNESIUM SULFATE IN D5W 1G/100ML (PREMIX) 1-5 GM/100ML-% SOLUTION: Performed by: INTERNAL MEDICINE

## 2021-09-12 PROCEDURE — 25010000002 FUROSEMIDE PER 20 MG: Performed by: INTERNAL MEDICINE

## 2021-09-12 RX ORDER — ACETAMINOPHEN 325 MG/1
650 TABLET ORAL EVERY 6 HOURS PRN
Status: DISCONTINUED | OUTPATIENT
Start: 2021-09-12 | End: 2021-09-16 | Stop reason: HOSPADM

## 2021-09-12 RX ORDER — MAGNESIUM SULFATE 1 G/100ML
1 INJECTION INTRAVENOUS
Status: COMPLETED | OUTPATIENT
Start: 2021-09-12 | End: 2021-09-12

## 2021-09-12 RX ADMIN — CEFEPIME HYDROCHLORIDE 2 G: 2 INJECTION, POWDER, FOR SOLUTION INTRAVENOUS at 09:11

## 2021-09-12 RX ADMIN — MAGNESIUM OXIDE TAB 400 MG (241.3 MG ELEMENTAL MG) 400 MG: 400 (241.3 MG) TAB at 09:10

## 2021-09-12 RX ADMIN — NICOTINE 1 PATCH: 21 PATCH, EXTENDED RELEASE TRANSDERMAL at 09:10

## 2021-09-12 RX ADMIN — ONDANSETRON 4 MG: 2 INJECTION INTRAMUSCULAR; INTRAVENOUS at 18:41

## 2021-09-12 RX ADMIN — MAGNESIUM SULFATE 1 G: 1 INJECTION INTRAVENOUS at 13:30

## 2021-09-12 RX ADMIN — CEFEPIME HYDROCHLORIDE 2 G: 2 INJECTION, POWDER, FOR SOLUTION INTRAVENOUS at 17:10

## 2021-09-12 RX ADMIN — DEXAMETHASONE SODIUM PHOSPHATE 10 MG: 10 INJECTION INTRAMUSCULAR; INTRAVENOUS at 09:11

## 2021-09-12 RX ADMIN — ARFORMOTEROL TARTRATE 15 MCG: 15 SOLUTION RESPIRATORY (INHALATION) at 21:15

## 2021-09-12 RX ADMIN — MIDODRINE HYDROCHLORIDE 5 MG: 2.5 TABLET ORAL at 11:31

## 2021-09-12 RX ADMIN — MAGNESIUM SULFATE 1 G: 1 INJECTION INTRAVENOUS at 12:29

## 2021-09-12 RX ADMIN — BUDESONIDE 0.5 MG: 0.5 INHALANT ORAL at 09:33

## 2021-09-12 RX ADMIN — ACETAMINOPHEN 650 MG: 325 TABLET ORAL at 17:34

## 2021-09-12 RX ADMIN — FAMOTIDINE 20 MG: 10 INJECTION INTRAVENOUS at 09:11

## 2021-09-12 RX ADMIN — MIDODRINE HYDROCHLORIDE 5 MG: 2.5 TABLET ORAL at 17:10

## 2021-09-12 RX ADMIN — MAGNESIUM OXIDE TAB 400 MG (241.3 MG ELEMENTAL MG) 400 MG: 400 (241.3 MG) TAB at 21:09

## 2021-09-12 RX ADMIN — ARFORMOTEROL TARTRATE 15 MCG: 15 SOLUTION RESPIRATORY (INHALATION) at 09:33

## 2021-09-12 RX ADMIN — SODIUM CHLORIDE, PRESERVATIVE FREE 10 ML: 5 INJECTION INTRAVENOUS at 09:10

## 2021-09-12 RX ADMIN — MAGNESIUM SULFATE 1 G: 1 INJECTION INTRAVENOUS at 11:31

## 2021-09-12 RX ADMIN — MAGNESIUM SULFATE 1 G: 1 INJECTION INTRAVENOUS at 10:21

## 2021-09-12 RX ADMIN — FUROSEMIDE 40 MG: 10 INJECTION, SOLUTION INTRAMUSCULAR; INTRAVENOUS at 17:10

## 2021-09-12 RX ADMIN — CEFEPIME HYDROCHLORIDE 2 G: 2 INJECTION, POWDER, FOR SOLUTION INTRAVENOUS at 01:41

## 2021-09-12 RX ADMIN — FUROSEMIDE 40 MG: 10 INJECTION, SOLUTION INTRAMUSCULAR; INTRAVENOUS at 09:11

## 2021-09-12 RX ADMIN — ENOXAPARIN SODIUM 40 MG: 40 INJECTION SUBCUTANEOUS at 09:10

## 2021-09-12 RX ADMIN — MIDODRINE HYDROCHLORIDE 5 MG: 2.5 TABLET ORAL at 09:10

## 2021-09-12 RX ADMIN — SODIUM CHLORIDE, PRESERVATIVE FREE 10 ML: 5 INJECTION INTRAVENOUS at 17:10

## 2021-09-12 RX ADMIN — FAMOTIDINE 20 MG: 10 INJECTION INTRAVENOUS at 21:09

## 2021-09-12 RX ADMIN — BUDESONIDE 0.5 MG: 0.5 INHALANT ORAL at 21:15

## 2021-09-12 RX ADMIN — SODIUM CHLORIDE, PRESERVATIVE FREE 10 ML: 5 INJECTION INTRAVENOUS at 21:09

## 2021-09-12 NOTE — PLAN OF CARE
Goal Outcome Evaluation:  Plan of Care Reviewed With: patient        Progress: improving  Outcome Summary: Patient is alert and oriented to person, place and time confused to situation intermittently, on 2L NC, no complaints of pain, feeding himself with meal setup, no other changes.

## 2021-09-12 NOTE — PROGRESS NOTES
[facility]   Hospitalist Progress Note  Date: 2021  Patient Name: Conrad Guerrier  : 1960  MRN: [id number]  Date of admission: 2021  Consultants:   -Pulmonology/Critical Care: Dr. Laz Uribe, Dr. Herbert Oliva    Subjective   Subjective     Chief Complaint: Shortness of breath    Summary:   Conrad Guerrier is a 61 y.o. male with past medical history significant for neuropathy, alcohol abuse who presented to ED with altered mental status.  Evaluation in the ED significant for patient requiring submental O2 to maintain O2 sats greater than 90%, chest x-ray obtained and demonstrated bilateral infiltrates consistent with multifocal pneumonia.  COVID-19 testing obtained and returned positive.  Patient had worsening hypoxia on morning of 2021 and required NIPPV.  Pulmonology consulted to assist in care.  Patient's respiratory status improved and patient's O2 requirement weaned as tolerated.  Treatment with Decadron, remdesivir, bronchodilators and cefepime continued during admission.  Patient went into SVT with heart rates in the 190s and started agonal breathing.  RRT was called and the patient was intubated and moved to the ICU on .  Patient able to be successfully extubated on 2021.  Patient did require Levophed but this was able to be weaned off successfully.  Patient's mentation slowly improved and patient supplemental O2 requirement also weaned.  Patient completed 7 days of antibiotic therapy with cefepime.    Interval Followup:   No acute events overnight.  Supplement O2 requirement continues to be weaned.  Patient's agitation level decreased.  Patient alert oriented x3 mentation continues to improve.  Patient denies any chest pain, abdominal pain, nausea or vomiting.  Nursing with no additional acute issues to report.    Antibiotics:   -Cefepime  -Vancomycin    Review of Systems   10 point review of systems performed and negative unless stated otherwise under  subjective.    Objective   Objective     Vitals:   Temp:  [97.5 °F (36.4 °C)-97.8 °F (36.6 °C)] 97.7 °F (36.5 °C)  Heart Rate:  [88-98] 88  Resp:  [17-18] 18  BP: ()/(66-84) 104/70  Flow (L/min):  [2-3] 2  Physical Exam   Gen: No acute distress, resting comfortably in bed, easily arousable, hard of hearing  HEENT: MMM, diffuse bruising appreciated over patient's nose and forehead  Neck: Supple, Trachea midline  Resp: Good aeration, diminished breath sounds bilaterally, equal chest rise bilaterally, no increase in work of breathing  Card: RRR, No m/r/g  Abd: Soft, Nontender, Nondistended, + bowel sounds  Ext: No cyanosis, No clubbing  Neuro: CN II-XII grossly intact, No focal deficits appreciated, AAO x 3    Result Review    Result Review:  I have personally reviewed the results from the time of this admission to 9/12/2021 15:00 EDT and agree with these findings:  [x]  Laboratory: Creatinine and electrolytes stable on review.  Ferritin and CRP downtrending.  Magnesium low.  D-dimer downtrending.  WBC and hemoglobin stable.  []  Microbiology:   []  Radiology:   [x]  EKG/Telemetry: Sinus rhythm.  No acute events.  []  Cardiology/Vascular:    []  Pathology:  []  Old records:  []  Other:    Assessment/Plan   Assessment / Plan     Assessment:  COVID-19 pneumonia  Acute hypoxic respiratory failure requiring intubation on 9/8  Viral pneumonia  Toxic/metabolic encephalopathy  Therapeutic drug level monitoring, remdesivir  Sinus tachycardia  Alcohol abuse  Hypomagnesemia  Hypokalemia  Alcohol withdrawal  Mechanical fall    Plan:  -Pulmonology/Critical Care consulted and following, appreciate assistance and recommendations in the care of this patient.  -Continue supplemental O2 to maintain sats greater than 90%, wean as tolerated  -Mild transaminitis, continue to trend LFTs daily  -Continue dexamethasone and plan to treat for total of 10 days (Day 8/10)  -Continue nebulizers, bronchopulmonary hygiene protocol, since  primary flutter valve  -Continue thiamine, multivitamin and folic acid  -Continue CIWA protocol  -Placed magnesium  -Monitor electrolytes and renal function with BMP and magnesium level in the AM  -Monitor WBC and Hgb with CBC in the AM  -Clinical course will dictate further management     DVT Prophylaxis: Lovenox  GI Prophylaxis: Pepcid  Diet: Full liquid  Dispo: PT/OT consulted  Code Status: Full code     Personally reviewed patients labs and imaging, discussed with patient and nurse at bedside. Discussed case with the following consultants: Pulmonology/Critical Care.     Part of this note may be an electronic transcription/translation of spoken language to printed text using the Dragon dictation system.    DVT prophylaxis:  Medical DVT prophylaxis orders are present.    CODE STATUS:   Level Of Support Discussed With: Patient  Code Status: CPR  Medical Interventions (Level of Support Prior to Arrest): Full      Electronically signed by Odell Curtis MD, 09/12/21, 3:00 PM EDT.

## 2021-09-12 NOTE — PROGRESS NOTES
Pulmonary / Critical Care Progress Note      Patient Name: Conrad Guerrier  : 1960  MRN: [id number]  Attending:  Odell Curtis MD  Date of admission: 2021    Subjective   Subjective   Follow-up for COVID-19     21--> COVID+, Completed 5 day course of Remdesivir.  21-->intubated  21-->extubated     Over the last 24 hours, mentation is improving.  Continues on antibiotics, steroids, diuretics, and nebulizers.     No acute events overnight.     This morning,  On 3L with O2 sats 94%  Decreased to 2L NC  Hard of hearing  Mentation improving  Alert and oriented x3  Non-productive cough  Dyspnea improved  No wheezing  No chest pain  No fevers or chills     Review of Systems  General: + fatigue and weakness, otherwise denied complaints  Cardiovascular:  Denied complaints  Respiratory dyspnea, cough, otherwise denied complaints  Gastrointestinal: Denied complaints    Objective   Objective     Vitals:   Temp:  [97.4 °F (36.3 °C)-97.7 °F (36.5 °C)] 97.5 °F (36.4 °C)  Heart Rate:  [88-98] 88  Resp:  [17-18] 18  BP: ()/(60-84) 104/74  Flow (L/min):  [3-5] 3    Physical Exam   Vital Signs Reviewed   General: WDWN male, Awake and Alert, NAD on 2L NC  HEENT:  PERRL, EOMI.  OP, nares clear  Chest:  good aeration, lungs diminished bilaterally, tympanic to percussion bilaterally, no work of breathing noted  CV: RRR, no MGR, pulses 2+, equal  Abd:  Soft, NT, ND, + BS, no HSM  EXT:  no clubbing, no cyanosis, no edema  Neuro:  A&Ox3, intermittently confused, CN grossly intact, no focal deficits  Skin: No rashes or lesions noted, face with bruising across nose and eyes from fall prior to hospitalization    Result Review    Result Review:  I have personally reviewed the results from the time of this admission to 2021 08:20 EDT and agree with these findings:  [x]  Laboratory  [x]  Microbiology  [x]  Radiology  [x]  EKG/Telemetry   []  Cardiology/Vascular   []  Pathology  []  Old records  [x]  Other:    WBC  11.6, Cr 0.48, K+ 4.6, Mg+ 1.5, Procal 0.59    COVID LABS:  Results From Last 14 Days   Lab Units 09/12/21  0515 09/08/21  2305 09/08/21  0417 09/07/21  0357 09/06/21  0630 09/06/21  0629 09/05/21  1454 09/05/21  0530 09/05/21  0136 09/04/21  2221 09/04/21  2214   PROBNP pg/mL  --  13,994.0*  --   --   --   --  3,971.0*  --   --   --   --    CRP mg/dL 3.54*  --  6.02*  --   --  16.50*  --   --   --   --   --    D DIMER QUANT mg/L (FEU) 1.26*  --  1.80*  --  1.36*  --   --   --   --   --   --    FERRITIN ng/mL 2,513.00*  --  3,391.00*  --   --  6,926.00*  --   --   --   --   --    LACTATE mmol/L  --   --   --   --   --   --   --   --  2.0 3.6*  --    PROCALCITONIN ng/mL  --  0.59* 0.68* 0.99*  --   --   --    < >  --   --   --    TROPONIN T ng/mL  --   --   --   --   --   --   --   --   --   --  <0.010    < > = values in this interval not displayed.       Assessment/Plan   Assessment / Plan     Active Hospital Problems:  Active Hospital Problems    Diagnosis    • Suspected COVID-19 virus infection      Impression:  Acute hypoxemic respiratory failure  Covid-19 pneumonia  Altered mental status  Toxic/metabolic encephalopathy  Therapeutic drug monitoring of remdesivir  Alcohol use with concern for alcohol withdrawal  Fall outside of hospital  Hypomagnesemia    Plan:  Continue to wean O2 to keep sats >90%.  Completed 5 day course of Remdesivir.  Continue daily CMP for therapeutic drug monitoring.  Continue to trend inflammatory markers.  Continue Decadron 10 mg IV daily for 10 days.  Continue nebulizers and bronchopulmonary hygiene.  Continue IS and flutter valve.  Encourage to prone or lay side to side.  Otherwise, get patient out of bed and into chair.  Finish 7 days of cefepime, day 4.  Continue Lasix IV BID.  Monitor renal function and electrolytes.  Replace magnesium IV  Continue Proamatine TID.  Consult PT/OT.     DVT prophylaxis:  Medical DVT prophylaxis orders are present.    CODE STATUS:   Level Of Support  Discussed With: Patient  Code Status: CPR  Medical Interventions (Level of Support Prior to Arrest): Full    Patient is on 2 L of oxygen.  From a respiratory perspective, the patient is stable for discharge.  Rest per primary       Labs, imaging, notes, microbiology and medications personally reviewed.  Discussed with primary and bedside RN.     I will sign off.  Please call with questions.    Electronically signed by LESIA Gale, 09/12/21, 10:18 AM EDT.  Electronically signed by Herbert Oliva MD, 09/12/21, 12:35 PM EDT.

## 2021-09-12 NOTE — PLAN OF CARE
Problem: Adult Inpatient Plan of Care  Goal: Plan of Care Review  Outcome: Ongoing, Progressing  Flowsheets (Taken 9/12/2021 0455)  Progress: improving  Plan of Care Reviewed With: patient  Outcome Summary: Pt is more alert this shift, is able to answer questions better and express needs. Continues on bedrest, is continuing on 5L O2. Will continue to monitor.  Goal: Patient-Specific Goal (Individualized)  Outcome: Ongoing, Progressing  Goal: Absence of Hospital-Acquired Illness or Injury  Outcome: Ongoing, Progressing  Intervention: Identify and Manage Fall Risk  Recent Flowsheet Documentation  Taken 9/12/2021 0400 by Thorn, Erinne, RN  Safety Promotion/Fall Prevention: safety round/check completed  Taken 9/12/2021 0200 by Thorn, Erinne, RN  Safety Promotion/Fall Prevention: safety round/check completed  Taken 9/12/2021 0000 by Thorn, Erinne, RN  Safety Promotion/Fall Prevention: safety round/check completed  Taken 9/11/2021 2200 by Thorn, Erinne, RN  Safety Promotion/Fall Prevention: safety round/check completed  Taken 9/11/2021 2000 by Thorn, Erinne, RN  Safety Promotion/Fall Prevention: safety round/check completed  Intervention: Prevent Skin Injury  Recent Flowsheet Documentation  Taken 9/11/2021 2030 by Thorn, Erinne, RN  Body Position: position changed independently  Skin Protection: skin sealant/moisture barrier applied  Intervention: Prevent and Manage VTE (venous thromboembolism) Risk  Recent Flowsheet Documentation  Taken 9/11/2021 2030 by Thorn, Erinne, RN  VTE Prevention/Management: (see MAR) other (see comments)  Intervention: Prevent Infection  Recent Flowsheet Documentation  Taken 9/12/2021 0400 by Thorn, Erinne, RN  Infection Prevention:   single patient room provided   rest/sleep promoted   hand hygiene promoted  Taken 9/12/2021 0200 by Thorn, Erinne, RN  Infection Prevention:   single patient room provided   rest/sleep promoted   hand hygiene promoted  Taken 9/12/2021 0000 by Thorn, Erinne,  RN  Infection Prevention:   single patient room provided   rest/sleep promoted   hand hygiene promoted  Taken 9/11/2021 2200 by Thorn, Erinne, RN  Infection Prevention:   single patient room provided   rest/sleep promoted   hand hygiene promoted  Taken 9/11/2021 2000 by Thorn, Erinne, RN  Infection Prevention:   single patient room provided   rest/sleep promoted   hand hygiene promoted  Goal: Optimal Comfort and Wellbeing  Outcome: Ongoing, Progressing  Intervention: Provide Person-Centered Care  Recent Flowsheet Documentation  Taken 9/11/2021 2030 by Thorn, Erinne, RN  Trust Relationship/Rapport:   care explained   questions answered   questions encouraged   thoughts/feelings acknowledged  Goal: Readiness for Transition of Care  Outcome: Ongoing, Progressing     Problem: Fluid Imbalance (Pneumonia)  Goal: Fluid Balance  Outcome: Ongoing, Progressing  Intervention: Monitor and Manage Fluid Balance  Recent Flowsheet Documentation  Taken 9/11/2021 2030 by Thorn, Erinne, RN  Fluid/Electrolyte Management: fluids provided     Problem: Infection (Pneumonia)  Goal: Resolution of Infection Signs and Symptoms  Outcome: Ongoing, Progressing  Intervention: Prevent Infection Progression  Recent Flowsheet Documentation  Taken 9/12/2021 0400 by Thorn, Erinne, RN  Isolation Precautions: airborne precautions maintained  Taken 9/12/2021 0200 by Thorn, Erinne, RN  Isolation Precautions: airborne precautions maintained  Taken 9/12/2021 0000 by Thorn, Erinne, RN  Isolation Precautions: airborne precautions maintained  Taken 9/11/2021 2200 by Thorn, Erinne, RN  Isolation Precautions: airborne precautions maintained  Taken 9/11/2021 2030 by Thorn, Erinne, RN  Infection Management: aseptic technique maintained  Taken 9/11/2021 2000 by Thorn, Erinne, RN  Isolation Precautions: airborne precautions maintained     Problem: Respiratory Compromise (Pneumonia)  Goal: Effective Oxygenation and Ventilation  Outcome: Ongoing,  Progressing  Intervention: Promote Airway Secretion Clearance  Recent Flowsheet Documentation  Taken 9/11/2021 2030 by Thorn, Erinne, RN  Breathing Techniques/Airway Clearance: deep/controlled cough encouraged  Cough And Deep Breathing: done independently per patient  Intervention: Optimize Oxygenation and Ventilation  Recent Flowsheet Documentation  Taken 9/11/2021 2030 by Thorn, Erinne, RN  Head of Bed (HOB): [facility] elevated  Airway/Ventilation Management: airway patency maintained     Problem: Skin Injury Risk Increased  Goal: Skin Health and Integrity  Outcome: Ongoing, Progressing  Intervention: Optimize Skin Protection  Recent Flowsheet Documentation  Taken 9/11/2021 2030 by Thorn, Erinne, RN  Pressure Reduction Techniques:   frequent weight shift encouraged   heels elevated off bed  Head of Bed (HOB): [facility] elevated  Pressure Reduction Devices: positioning supports utilized  Skin Protection: skin sealant/moisture barrier applied     Problem: Fall Injury Risk  Goal: Absence of Fall and Fall-Related Injury  Outcome: Ongoing, Progressing  Intervention: Identify and Manage Contributors to Fall Injury Risk  Recent Flowsheet Documentation  Taken 9/12/2021 0400 by Thorn, Erinne, RN  Medication Review/Management: medications reviewed  Taken 9/12/2021 0200 by Thorn, Erinne, RN  Medication Review/Management: medications reviewed  Taken 9/12/2021 0000 by Thorn, Erinne, RN  Medication Review/Management: medications reviewed  Taken 9/11/2021 2200 by Thorn, Erinne, RN  Medication Review/Management: medications reviewed  Taken 9/11/2021 2030 by Thorn, Erinne, RN  Self-Care Promotion: independence encouraged  Taken 9/11/2021 2000 by Thorn, Erinne, RN  Medication Review/Management: medications reviewed  Intervention: Promote Injury-Free Environment  Recent Flowsheet Documentation  Taken 9/12/2021 0400 by Thorn, Erinne, RN  Safety Promotion/Fall Prevention: safety round/check completed  Taken 9/12/2021 0200 by Thorn, Erinne,  RN  Safety Promotion/Fall Prevention: safety round/check completed  Taken 9/12/2021 0000 by Thorn, Erinne, RN  Safety Promotion/Fall Prevention: safety round/check completed  Taken 9/11/2021 2200 by Thorn, Erinne, RN  Safety Promotion/Fall Prevention: safety round/check completed  Taken 9/11/2021 2000 by Thorn, Erinne, RN  Safety Promotion/Fall Prevention: safety round/check completed     Problem: Noninvasive Ventilation Acute  Goal: Effective Unassisted Ventilation and Oxygenation  Outcome: Ongoing, Progressing  Intervention: Monitor and Manage Noninvasive Ventilation  Recent Flowsheet Documentation  Taken 9/11/2021 2030 by Thorn, Erinne, RN  Airway/Ventilation Management: airway patency maintained     Problem: Restraint, Nonbehavioral (Nonviolent)  Goal: Discontinuation Criteria Achieved  Outcome: Ongoing, Progressing  Intervention: Implement Least-restrictive Safety Strategies  Recent Flowsheet Documentation  Taken 9/11/2021 2030 by Thorn, Erinne, RN  Diversional Activities: television  Goal: Personal Dignity and Safety Maintained  Outcome: Ongoing, Progressing  Intervention: Protect Dignity, Rights, and Personal Wellbeing  Recent Flowsheet Documentation  Taken 9/11/2021 2030 by Thorn, Erinne, RN  Trust Relationship/Rapport:   care explained   questions answered   questions encouraged   thoughts/feelings acknowledged  Intervention: Protect Skin and Joint Integrity  Recent Flowsheet Documentation  Taken 9/11/2021 2030 by Thorn, Erinne, RN  Body Position: position changed independently  Range of Motion:   active ROM (range of motion) encouraged   ROM (range of motion) performed   Goal Outcome Evaluation:  Plan of Care Reviewed With: patient        Progress: improving  Outcome Summary: Pt is more alert this shift, is able to answer questions better and express needs. Continues on bedrest, is continuing on 5L O2. Will continue to monitor.

## 2021-09-13 LAB
ALBUMIN SERPL-MCNC: 2.6 G/DL (ref 3.5–5.2)
ALBUMIN/GLOB SERPL: 0.9 G/DL
ALP SERPL-CCNC: 361 U/L (ref 39–117)
ALT SERPL W P-5'-P-CCNC: 66 U/L (ref 1–41)
ANION GAP SERPL CALCULATED.3IONS-SCNC: 9.7 MMOL/L (ref 5–15)
AST SERPL-CCNC: 84 U/L (ref 1–40)
BILIRUB SERPL-MCNC: 0.9 MG/DL (ref 0–1.2)
BUN SERPL-MCNC: 22 MG/DL (ref 8–23)
BUN/CREAT SERPL: 43.1 (ref 7–25)
CALCIUM SPEC-SCNC: 8.8 MG/DL (ref 8.6–10.5)
CHLORIDE SERPL-SCNC: 95 MMOL/L (ref 98–107)
CO2 SERPL-SCNC: 26.3 MMOL/L (ref 22–29)
CREAT SERPL-MCNC: 0.51 MG/DL (ref 0.76–1.27)
DEPRECATED RDW RBC AUTO: 53.1 FL (ref 37–54)
ERYTHROCYTE [DISTWIDTH] IN BLOOD BY AUTOMATED COUNT: 15.6 % (ref 12.3–15.4)
GFR SERPL CREATININE-BSD FRML MDRD: >150 ML/MIN/1.73
GLOBULIN UR ELPH-MCNC: 2.8 GM/DL
GLUCOSE SERPL-MCNC: 114 MG/DL (ref 65–99)
HCT VFR BLD AUTO: 31.8 % (ref 37.5–51)
HGB BLD-MCNC: 11.4 G/DL (ref 13–17.7)
HYPOCHROMIA BLD QL: ABNORMAL
LYMPHOCYTES # BLD MANUAL: 3.1 10*3/MM3 (ref 0.7–3.1)
LYMPHOCYTES NFR BLD MANUAL: 11 % (ref 5–12)
LYMPHOCYTES NFR BLD MANUAL: 12 % (ref 19.6–45.3)
MAGNESIUM SERPL-MCNC: 1.6 MG/DL (ref 1.6–2.4)
MCH RBC QN AUTO: 33.5 PG (ref 26.6–33)
MCHC RBC AUTO-ENTMCNC: 35.8 G/DL (ref 31.5–35.7)
MCV RBC AUTO: 93.5 FL (ref 79–97)
METAMYELOCYTES NFR BLD MANUAL: 1 % (ref 0–0)
MONOCYTES # BLD AUTO: 1.48 10*3/MM3 (ref 0.1–0.9)
NEUTROPHILS # BLD AUTO: 8.77 10*3/MM3 (ref 1.7–7)
NEUTROPHILS NFR BLD MANUAL: 65 % (ref 42.7–76)
NT-PROBNP SERPL-MCNC: 1156 PG/ML (ref 0–900)
OVALOCYTES BLD QL SMEAR: ABNORMAL
PLATELET # BLD AUTO: 272 10*3/MM3 (ref 140–450)
PMV BLD AUTO: 13.2 FL (ref 6–12)
POTASSIUM SERPL-SCNC: 4.4 MMOL/L (ref 3.5–5.2)
PROT SERPL-MCNC: 5.4 G/DL (ref 6–8.5)
RBC # BLD AUTO: 3.4 10*6/MM3 (ref 4.14–5.8)
SCAN SLIDE: NORMAL
SMALL PLATELETS BLD QL SMEAR: ADEQUATE
SODIUM SERPL-SCNC: 131 MMOL/L (ref 136–145)
STOMATOCYTES BLD QL SMEAR: ABNORMAL
TARGETS BLD QL SMEAR: ABNORMAL
VARIANT LYMPHS NFR BLD MANUAL: 11 % (ref 0–5)
WBC # BLD AUTO: 13.49 10*3/MM3 (ref 3.4–10.8)
WBC MORPH BLD: NORMAL

## 2021-09-13 PROCEDURE — 25010000002 DEXAMETHASONE PER 1 MG: Performed by: INTERNAL MEDICINE

## 2021-09-13 PROCEDURE — 97165 OT EVAL LOW COMPLEX 30 MIN: CPT

## 2021-09-13 PROCEDURE — 85007 BL SMEAR W/DIFF WBC COUNT: CPT | Performed by: NURSE PRACTITIONER

## 2021-09-13 PROCEDURE — 94799 UNLISTED PULMONARY SVC/PX: CPT

## 2021-09-13 PROCEDURE — 25010000003 MAGNESIUM SULFATE 4 GM/100ML SOLUTION: Performed by: INTERNAL MEDICINE

## 2021-09-13 PROCEDURE — 99233 SBSQ HOSP IP/OBS HIGH 50: CPT | Performed by: INTERNAL MEDICINE

## 2021-09-13 PROCEDURE — 83880 ASSAY OF NATRIURETIC PEPTIDE: CPT | Performed by: NURSE PRACTITIONER

## 2021-09-13 PROCEDURE — 25010000002 ENOXAPARIN PER 10 MG: Performed by: INTERNAL MEDICINE

## 2021-09-13 PROCEDURE — 94760 N-INVAS EAR/PLS OXIMETRY 1: CPT

## 2021-09-13 PROCEDURE — 97161 PT EVAL LOW COMPLEX 20 MIN: CPT

## 2021-09-13 PROCEDURE — 25010000002 CEFEPIME PER 500 MG: Performed by: INTERNAL MEDICINE

## 2021-09-13 PROCEDURE — 92526 ORAL FUNCTION THERAPY: CPT

## 2021-09-13 PROCEDURE — 25010000002 FUROSEMIDE PER 20 MG: Performed by: INTERNAL MEDICINE

## 2021-09-13 PROCEDURE — 83735 ASSAY OF MAGNESIUM: CPT | Performed by: NURSE PRACTITIONER

## 2021-09-13 PROCEDURE — 85025 COMPLETE CBC W/AUTO DIFF WBC: CPT | Performed by: NURSE PRACTITIONER

## 2021-09-13 PROCEDURE — 80053 COMPREHEN METABOLIC PANEL: CPT | Performed by: NURSE PRACTITIONER

## 2021-09-13 RX ORDER — MAGNESIUM SULFATE HEPTAHYDRATE 40 MG/ML
4 INJECTION, SOLUTION INTRAVENOUS ONCE
Status: COMPLETED | OUTPATIENT
Start: 2021-09-13 | End: 2021-09-13

## 2021-09-13 RX ADMIN — NICOTINE 1 PATCH: 21 PATCH, EXTENDED RELEASE TRANSDERMAL at 09:56

## 2021-09-13 RX ADMIN — MAGNESIUM OXIDE TAB 400 MG (241.3 MG ELEMENTAL MG) 400 MG: 400 (241.3 MG) TAB at 09:55

## 2021-09-13 RX ADMIN — CEFEPIME HYDROCHLORIDE 2 G: 2 INJECTION, POWDER, FOR SOLUTION INTRAVENOUS at 09:55

## 2021-09-13 RX ADMIN — FAMOTIDINE 20 MG: 10 INJECTION INTRAVENOUS at 09:55

## 2021-09-13 RX ADMIN — DEXAMETHASONE SODIUM PHOSPHATE 10 MG: 10 INJECTION INTRAMUSCULAR; INTRAVENOUS at 09:55

## 2021-09-13 RX ADMIN — ARFORMOTEROL TARTRATE 15 MCG: 15 SOLUTION RESPIRATORY (INHALATION) at 19:49

## 2021-09-13 RX ADMIN — FUROSEMIDE 40 MG: 10 INJECTION, SOLUTION INTRAMUSCULAR; INTRAVENOUS at 18:00

## 2021-09-13 RX ADMIN — ENOXAPARIN SODIUM 40 MG: 40 INJECTION SUBCUTANEOUS at 09:55

## 2021-09-13 RX ADMIN — BUDESONIDE 0.5 MG: 0.5 INHALANT ORAL at 19:49

## 2021-09-13 RX ADMIN — MIDODRINE HYDROCHLORIDE 5 MG: 2.5 TABLET ORAL at 09:55

## 2021-09-13 RX ADMIN — MAGNESIUM OXIDE TAB 400 MG (241.3 MG ELEMENTAL MG) 400 MG: 400 (241.3 MG) TAB at 21:41

## 2021-09-13 RX ADMIN — CEFEPIME HYDROCHLORIDE 2 G: 2 INJECTION, POWDER, FOR SOLUTION INTRAVENOUS at 00:28

## 2021-09-13 RX ADMIN — ARFORMOTEROL TARTRATE 15 MCG: 15 SOLUTION RESPIRATORY (INHALATION) at 08:41

## 2021-09-13 RX ADMIN — FAMOTIDINE 20 MG: 10 INJECTION INTRAVENOUS at 21:41

## 2021-09-13 RX ADMIN — MIDODRINE HYDROCHLORIDE 5 MG: 2.5 TABLET ORAL at 18:00

## 2021-09-13 RX ADMIN — FUROSEMIDE 40 MG: 10 INJECTION, SOLUTION INTRAMUSCULAR; INTRAVENOUS at 09:55

## 2021-09-13 RX ADMIN — BUDESONIDE 0.5 MG: 0.5 INHALANT ORAL at 08:41

## 2021-09-13 RX ADMIN — MAGNESIUM SULFATE 4 G: 4 INJECTION INTRAVENOUS at 09:56

## 2021-09-13 RX ADMIN — MIDODRINE HYDROCHLORIDE 5 MG: 2.5 TABLET ORAL at 12:23

## 2021-09-13 RX ADMIN — CEFEPIME HYDROCHLORIDE 2 G: 2 INJECTION, POWDER, FOR SOLUTION INTRAVENOUS at 18:00

## 2021-09-13 NOTE — THERAPY TREATMENT NOTE
Acute Care - Speech Language Pathology   Swallow Treatment Note TONYA Amaral     Patient Name: Conrad Guerrier  : 1960  MRN: [id number]  Today's Date: 2021               Admit Date: 2021    Visit Dx:     ICD-10-CM ICD-9-CM   1. Suspected COVID-19 virus infection  Z20.822 V01.79   2. Pneumonia due to infectious organism, unspecified laterality, unspecified part of lung  J18.9 486   3. Acute respiratory failure with hypoxia (CMS/[facility])  J96.01 518.81   4. Alcohol dependence with unspecified alcohol-induced disorder (CMS/[facility])  F10.29 303.90     291.9   5. Hypomagnesemia  E83.42 275.2   6. Decreased activities of daily living (ADL)  Z78.9 V49.89   7. Difficulty in walking  R26.2 719.7     Patient Active Problem List   Diagnosis   • Suspected COVID-19 virus infection     History reviewed. No pertinent past medical history.  Past Surgical History:   Procedure Laterality Date   • ABDOMINAL SURGERY     SPEECH PATHOLOGY DYSPHAGIA TREATMENT    Subjective/Behavioral Observations: Patient seen for dysphagia tx.     Current Diet:Regular diet - thin liquids.     Current Strategies:  Bolus size modification.      Treatment received:Therapeutic trials of soft and regular solids completed. Patient with prolonged mastication with regular solids likely due to missing dentition. Tolerates thin liquids without s/s of aspiration.  Denies globus sensation .      Results of treatment:Downgrade diet to mechanical soft and thin liquids    Progress toward goals:As stated    Barriers to Achieving goals: Medical status      Plan of care:/changes in plan:  Mechanical soft diet  - thin liquids.  90 degrees upright.  Slow rate.    Oral meds in applesauce.      No further dysphagia tx indicated at this time.  Available for reconsult if warranted.     EDUCATION  The patient has been educated in the following areas:   Dysphagia (Swallowing Impairment).            Rachell Meneses, SLP  2021

## 2021-09-13 NOTE — THERAPY EVALUATION
Acute Care - Physical Therapy Initial Evaluation   [facility]     Patient Name: Conrad Guerrier  : 1960  MRN: [id number]  Today's Date: 2021   Admit date: 2021     Referring Physician: Tang Richards MD     Surgery Date:* No surgery found *               Visit Dx:     ICD-10-CM ICD-9-CM   1. Suspected COVID-19 virus infection  Z20.822 V01.79   2. Pneumonia due to infectious organism, unspecified laterality, unspecified part of lung  J18.9 486   3. Acute respiratory failure with hypoxia (CMS/HCC)  J96.01 518.81   4. Alcohol dependence with unspecified alcohol-induced disorder (CMS/HCC)  F10.29 303.90     291.9   5. Hypomagnesemia  E83.42 275.2   6. Decreased activities of daily living (ADL)  Z78.9 V49.89   7. Difficulty in walking  R26.2 719.7     Patient Active Problem List   Diagnosis   • Suspected COVID-19 virus infection     History reviewed. No pertinent past medical history.  Past Surgical History:   Procedure Laterality Date   • ABDOMINAL SURGERY          PT Assessment (last 12 hours)      PT Evaluation and Treatment     Row Name 21 140          Physical Therapy Time and Intention    Subjective Information  no complaints  -TOMAS     Document Type  evaluation  -TOMAS     Mode of Treatment  individual therapy;physical therapy  -TOMAS     Row Name 21 140          General Information    Patient Observations  alert;cooperative;agree to therapy  -TOMAS     Prior Level of Function  independent:  -TOMAS     Equipment Currently Used at Home  none pt with AMS all subjective history is questionable  -TOMAS     Existing Precautions/Restrictions  fall  -TOMAS     Barriers to Rehab  none identified  -TOMAS     Row Name 21 140          Living Environment    Current Living Arrangements  home/apartment/condo  -TOMAS     Lives With  alone  -TOMAS     Row Name 21 140          Range of Motion (ROM)    Range of Motion  ROM is WFL  -TOMAS     Row Name 21 140          Strength (Manual Muscle Testing)    Strength  (Manual Muscle Testing)  bilateral lower extremities 3+/5  -TOMAS     Row Name 09/13/21 1401          Bed Mobility    Bed Mobility  bed mobility (all) activities;supine-sit  -TOMAS     All Activities, [place] (Bed Mobility)  contact guard assist  -TOMAS     Supine-Sit [place] (Bed Mobility)  contact guard  -TOMAS     Row Name 09/13/21 1401          Transfers    Transfers  bed-chair transfer;sit-stand transfer  -TOMAS     Bed-Chair [place] (Transfers)  contact guard  -TOMAS     Assistive Device (Bed-Chair Transfers)  walker, front-wheeled  -TOMAS     Sit-Stand [place] (Transfers)  contact guard  -TOMAS     Row Name 09/13/21 1401          Gait/Stairs (Locomotion)    Gait/Stairs Locomotion  gait/ambulation assistive device  -TOMAS     [place] Level (Gait)  contact guard  -TOMAS     Assistive Device (Gait)  walker, front-wheeled  -TOMAS     Distance in Feet (Gait)  5  -TOMAS     Row Name 09/13/21 1401          Safety Issues, Functional Mobility    Safety Issues Affecting Function (Mobility)  awareness of need for assistance  -TOMAS     Impairments Affecting Function (Mobility)  balance;strength;endurance/activity tolerance  -TOMAS     Row Name 09/13/21 1401          Balance    Balance Assessment  standing dynamic balance  -TOMAS     Dynamic Standing Balance  mild impairment  -TOMAS     Row Name             Wound 09/05/21 0404 Bilateral medial coccyx Pressure Injury    Wound - Properties Group Placement Date: 09/05/21  -AF Placement Time: 0404  -AF Present on Hospital Admission: Y  -AF Side: Bilateral  -AF Orientation: medial  -AF Location: coccyx  -AF Primary Wound Type: Pressure inj  -AF Stage, Pressure Injury : Stage 1  -AF    Retired Wound - Properties Group Date first assessed: 09/05/21  -AF Time first assessed: 0404  -AF Present on Hospital Admission: Y  -AF Side: Bilateral  -AF Location: coccyx  -AF Primary Wound Type: Pressure inj  -AF    Row Name             Wound 09/11/21 1622 Left upper sternal Puncture    Wound - Properties Group  Placement Date: 09/11/21  -AM Placement Time: 1622  -AM Side: Left  -AM Orientation: upper  -AM Location: sternal  -AM Primary Wound Type: Puncture  -AM Stage, Pressure Injury : other (see comments)  -AM Additional Comments: Triple lumen catheter removed today, petroleum and transparent gauze applied.  -AM    Retired Wound - Properties Group Date first assessed: 09/11/21  -AM Time first assessed: 1622  -AM Side: Left  -AM Location: sternal  -AM Primary Wound Type: Puncture  -AM Additional Comments: Triple lumen catheter removed today, petroleum and transparent gauze applied.  -AM    Row Name 09/13/21 1408          Plan of Care Review    Plan of Care Reviewed With  patient  -TOMAS     Outcome Summary  Patient presents with decreased strength, transfers and ambulation.  Skilled physical therapy services will be required to address these mobility deficits.  -TOMAS     Row Name 09/13/21 1408          Physical Therapy Goals    Transfer Goal Selection (PT)  transfer, PT goal 1  -TOMAS     Gait Training Goal Selection (PT)  gait training, PT goal 1  -TOMAS     Row Name 09/13/21 1408          Transfer Goal 1 (PT)    Activity/Assistive Device (Transfer Goal 1, PT)  transfers, all  -TOMAS     [place] Level/Cues Needed (Transfer Goal 1, PT)  independent  -TOMAS     Time Frame (Transfer Goal 1, PT)  long term goal (LTG);10 days  -TOMAS     Row Name 09/13/21 1408          Gait Training Goal 1 (PT)    Activity/Assistive Device (Gait Training Goal 1, PT)  gait (walking locomotion);assistive device use;walker, rolling  -TOMAS     [place] Level (Gait Training Goal 1, PT)  independent  -TOMAS     Distance (Gait Training Goal 1, PT)  300  -TOMAS     Time Frame (Gait Training Goal 1, PT)  long term goal (LTG);10 days  -TOMAS     Row Name 09/13/21 1408          Therapy Assessment/Plan (PT)    Patient/Family Therapy Goals Statement (PT)  Patient would like to return home independently  -TOMAS     Rehab Potential (PT)  good, to achieve stated therapy goals  -TOMAS      Criteria for Skilled Interventions Met (PT)  skilled treatment is necessary  -TOMAS     Predicted Duration of Therapy Intervention (PT)  10 days  -TOMAS     Problem List (PT)  problems related to;balance;cognition;strength;mobility  -TOMAS     Activity Limitations Related to Problem List (PT)  unable to ambulate safely;unable to transfer safely  -TOMAS     Row Name 09/13/21 1408          PT Evaluation Complexity    History, PT Evaluation Complexity  no personal factors and/or comorbidities  -TOMAS     Examination of Body Systems (PT Eval Complexity)  total of 4 or more elements  -TOMAS     Clinical Presentation (PT Evaluation Complexity)  stable  -TOMAS     Clinical Decision Making (PT Evaluation Complexity)  low complexity  -TOMAS     Overall Complexity (PT Evaluation Complexity)  low complexity  -TOMAS     Row Name 09/13/21 1402          Therapy Plan Review/Discharge Plan (PT)    Therapy Plan Review (PT)  evaluation/treatment results reviewed  -TOMAS       User Key  (r) = Recorded By, (t) = Taken By, (c) = Cosigned By    Initials Name Provider Type    Thi Neely RN Registered Nurse    Shanna Morales RN Registered Nurse    Javy Britt, PT Physical Therapist        Physical Therapy Education                 Title: PT OT SLP Therapies (In Progress)     Topic: Physical Therapy (Done)     Point: Mobility training (Done)     Learning Progress Summary           Patient Acceptance, E,TB, VU by TOMAS at 9/13/2021 1414                   Point: Home exercise program (Done)     Learning Progress Summary           Patient Acceptance, E,TB, VU by TOMAS at 9/13/2021 1414                   Point: Body mechanics (Done)     Learning Progress Summary           Patient Acceptance, E,TB, VU by TOMAS at 9/13/2021 1414                   Point: Precautions (Done)     Learning Progress Summary           Patient Acceptance, E,TB, VU by TOMAS at 9/13/2021 1414                               User Key     Initials Effective Dates Name Provider Type Discipline     TOMAS 06/03/21 -  Javy Spring PT Physical Therapist PT              PT Recommendation and Plan  Anticipated Discharge Disposition (PT): inpatient rehabilitation facility, skilled nursing facility  Planned Therapy Interventions (PT): balance training, bed mobility training, gait training, strengthening, transfer training  Therapy Frequency (PT): daily  Plan of Care Reviewed With: patient  Outcome Summary: Patient presents with decreased strength, transfers and ambulation.  Skilled physical therapy services will be required to address these mobility deficits.  Outcome Measures     Row Name 09/13/21 1413             How much help from another person do you currently need...    Turning from your back to your side while in flat bed without using bedrails?  3  -TOMAS      Moving from lying on back to sitting on the side of a flat bed without bedrails?  3  -TOMAS      Moving to and from a bed to a chair (including a wheelchair)?  3  -TOMAS      Standing up from a chair using your arms (e.g., wheelchair, bedside chair)?  3  -TOMAS      Climbing 3-5 steps with a railing?  3  -TOMAS      To walk in hospital room?  3  -TOMAS      AM-PAC 6 Clicks Score (PT)  18  -TOMAS         Functional Assessment    Outcome Measure Options  AM-PAC 6 Clicks Basic Mobility (PT)  -TOMAS        User Key  (r) = Recorded By, (t) = Taken By, (c) = Cosigned By    Initials Name Provider Type    Javy Britt PT Physical Therapist           Time Calculation:   PT Charges     Row Name 09/13/21 1403             Time Calculation    PT Received On  09/13/21  -TOMAS      PT Goal Re-Cert Due Date  09/23/21  -TOMAS         Untimed Charges    PT Eval/Re-eval Minutes  20  -TOMAS         Total Minutes    Untimed Charges Total Minutes  20  -TOMAS       Total Minutes  20  -TOMAS        User Key  (r) = Recorded By, (t) = Taken By, (c) = Cosigned By    Initials Name Provider Type    Javy Britt PT Physical Therapist        Therapy Charges for Today     Code Description Service Date  Service Provider Modifiers Qty    [id number] HC PT EVAL LOW COMPLEXITY 2 9/13/2021 Javy Spring, PT GP 1          PT G-Codes  Outcome Measure Options: AM-PAC 6 Clicks Basic Mobility (PT)  AM-PAC 6 Clicks Score (PT): 18  AM-PAC 6 Clicks Score (OT): 21    Javy Spring, RODY  9/13/2021

## 2021-09-13 NOTE — CONSULTS
Nutrition Services    Patient Name:  Conrad Guerrier  YOB: 1960  MRN: [id number]  Admit Date:  9/4/2021    Patient with improving PO intake. %. Receiving Ensure Enlive TID. Noted sodium 131. No updated weight since admission. Recommend obtaining new weight. RD will continue to follow per protocol.     Electronically signed by:  Lashaun Escalera RD  09/13/21 12:33 EDT

## 2021-09-13 NOTE — PROGRESS NOTES
[facility]   Hospitalist Progress Note  Date: 2021  Patient Name: Conrad Guerrier  : 1960  MRN: [id number]  Date of admission: 2021  Consultants:   -Pulmonology/Critical Care: Dr. Laz Uribe, Dr. Herbert Oliva    Subjective   Subjective     Chief Complaint: Shortness of breath    Summary:   Conrad Guerrier is a 61 y.o. male with past medical history significant for neuropathy, alcohol abuse who presented to ED with altered mental status.  Evaluation in the ED significant for patient requiring submental O2 to maintain O2 sats greater than 90%, chest x-ray obtained and demonstrated bilateral infiltrates consistent with multifocal pneumonia.  COVID-19 testing obtained and returned positive.  Patient had worsening hypoxia on morning of 2021 and required NIPPV.  Pulmonology consulted to assist in care.  Patient's respiratory status improved and patient's O2 requirement weaned as tolerated.  Treatment with Decadron, remdesivir, bronchodilators and cefepime continued during admission.  Patient went into SVT with heart rates in the 190s and started agonal breathing.  RRT was called and the patient was intubated and moved to the ICU on .  Patient able to be successfully extubated on 2021.  Patient did require Levophed but this was able to be weaned off successfully.  Patient's mentation slowly improved and patient supplemental O2 requirement also weaned.  Patient completed 7 days of antibiotic therapy with cefepime.    Interval Followup:   No acute events overnight.  Now down to 2 L nasal cannula of oxygen.  Mentation appears to improve back to his baseline, he has been calm and pleasant.  States he is feeling well today.  Denies much in the way of chest pain or shortness of air.  States that he just wants to go home at discharge does not want rehab.    Review of Systems   Denies nausea or vomiting    Objective   Objective     Vitals:   Temp:  [97.3 °F (36.3 °C)-98.7 °F (37.1 °C)] 97.6 °F  (36.4 °C)  Heart Rate:  [] 98  Resp:  [18] 18  BP: ()/(58-83) 90/58  Flow (L/min):  [2] 2  Physical Exam   Gen: No acute distress, sitting up in bedside chair, pleasant  HEENT: MMM, diffuse bruising appreciated over patient's nose and forehead, improving  Neck: Supple, Trachea midline  Resp: Good aeration, diminished breath sounds bilaterally, equal chest rise bilaterally, no increase in work of breathing  Card: RRR, No m/r/g  Abd: Soft, Nontender, Nondistended, + bowel sounds  Ext: No cyanosis, No clubbing  Neuro: CN II-XII grossly intact, No focal deficits appreciated, AAO x 3    Result Review    Result Review:  I have personally reviewed the results from the time of this admission to 9/13/2021 16:10 EDT and agree with these findings:  [x]  Laboratory:   [x]  Microbiology:   []  Radiology:   [x]  EKG/Telemetry:   []  Cardiology/Vascular:    []  Pathology:  []  Old records:  []  Other:  Telemetry reviewed showed normal sinus rhythm    Assessment/Plan   Assessment / Plan     Assessment:  COVID-19 pneumonia  Acute hypoxic respiratory failure requiring intubation on 9/8  Viral pneumonia  Toxic/metabolic encephalopathy  Therapeutic drug level monitoring, remdesivir  Sinus tachycardia  Alcohol abuse  Hypomagnesemia  Hypokalemia  Alcohol withdrawal  Mechanical fall    Pulmonology following, appreciate assistance and recommendations in the care of this patient.  Continue supplemental O2 to maintain sats greater than 90%, wean as tolerated  Mild transaminitis, continue to trend LFTs daily  Continue dexamethasone and plan to treat for total of 10 days (Day 9/10)  Continue with IV Lasix 40 mg twice daily, strict I's and O's  Continue IV cefepime, treat for total of 7 days   Continue nebulizers, bronchopulmonary hygiene protocol  Continue thiamine, multivitamin and folic acid  Monitor electrolytes and renal function with CMP and magnesium level in the AM  Monitor WBC and Hgb with CBC in the AM  Clinical course  will dictate further management     DVT Prophylaxis: Lovenox  GI Prophylaxis: Pepcid  Diet: Regular  Dispo: PT/OT consulted  Code Status: Full code     Personally reviewed patients labs and imaging, discussed with patient and nurse at bedside. Discussed case with the following consultants: Pulmonology/Critical Care.    DVT prophylaxis:  Medical DVT prophylaxis orders are present.    CODE STATUS:   Level Of Support Discussed With: Patient  Code Status: CPR  Medical Interventions (Level of Support Prior to Arrest): Full      Electronically signed by Tang Newsome MD, 09/13/21, 4:10 PM EDT.

## 2021-09-13 NOTE — THERAPY EVALUATION
Patient Name: Conrad Guerrier  : 1960    MRN: [id number]                              Today's Date: 2021       Admit Date: 2021    Visit Dx:     ICD-10-CM ICD-9-CM   1. Suspected COVID-19 virus infection  Z20.822 V01.79   2. Pneumonia due to infectious organism, unspecified laterality, unspecified part of lung  J18.9 486   3. Acute respiratory failure with hypoxia (CMS/[facility])  J96.01 518.81   4. Alcohol dependence with unspecified alcohol-induced disorder (CMS/[facility])  F10.29 303.90     291.9   5. Hypomagnesemia  E83.42 275.2   6. Decreased activities of daily living (ADL)  Z78.9 V49.89   7. Difficulty in walking  R26.2 719.7     Patient Active Problem List   Diagnosis   • Suspected COVID-19 virus infection     History reviewed. No pertinent past medical history.  Past Surgical History:   Procedure Laterality Date   • ABDOMINAL SURGERY       General Information     Row Name 21 1354          OT Time and Intention    Document Type  evaluation  -     Mode of Treatment  individual therapy;occupational therapy  -     Row Name 21 [facility]3          General Information    Patient Profile Reviewed  yes  -     Prior Level of Function  -- Independent with ADLs, ambulated without a device per patient report, uses a STC per EMR, has a step over tub where he stands to shower, standard commode, stands to groom, and no home oxygen in place.  -     Existing Precautions/Restrictions  fall;seizures  -     Barriers to Rehab  none identified  -     Row Name 21 1354          Occupational Profile    Reason for Services/Referral (Occupational Profile)  Occupational therapy consulted due to recent decline in ADLs/functional transfers. No previous occupational therapy services for current condition.  -     Row Name 21 1358          Living Environment    Lives With  alone  -     Row Name 21 1357          Cognition    Orientation Status (Cognition)  oriented x 3 mildly confused at times  -      Row Name 09/13/21 [facility]4          Safety Issues, Functional Mobility    Safety Issues Affecting Function (Mobility)  awareness of need for assistance;insight into deficits/self-awareness  -     Impairments Affecting Function (Mobility)  balance;cognition;endurance/activity tolerance  -     Comment, Safety Issues/Impairments (Mobility)  Therapeutic exercises to address endurance.  -       User Key  (r) = Recorded By, (t) = Taken By, (c) = Cosigned By    Initials Name Provider Type     Jessica Gerber OT Occupational Therapist          Mobility/ADL's     [facility] Name 09/13/21 [facility]          Bed Mobility    Bed Mobility  bed mobility (all) activities  -     All Activities, [place] (Bed Mobility)  standby assist  -     Assistive Device (Bed Mobility)  bed rails;draw sheet  -[facility] Name 09/13/21 [facility]6          Transfers    Transfers  bed-chair transfer;sit-stand transfer  -     Bed-Chair [place] (Transfers)  contact guard  -     Assistive Device (Bed-Chair Transfers)  walker, front-wheeled  -     Sit-Stand [place] (Transfers)  contact guard  -[facility] Name 09/13/21 [facility]          Sit-Stand Transfer    Assistive Device (Sit-Stand Transfers)  walker, front-wheeled  -LF     Row Name 09/13/21 [facility]          Functional Mobility    Functional Mobility- Ind. Level  contact guard assist  -     Functional Mobility- Device  rolling walker  -     Functional Mobility- Safety Issues  supplemental O2  -     Functional Mobility- Comment  Patient took 4-5 steps from bed to recliner with contact-guard assist using rolling walker.  -[facility] Name 09/13/21 [facility]6          Activities of Daily Living    BADL Assessment/Intervention  bathing;upper body dressing;lower body dressing;grooming;feeding;toileting  -[facility] Name 09/13/21 [facility]          Bathing Assessment/Intervention    [place] Level (Bathing)  bathing skills;upper body;standby assist;lower body;minimum assist (75% patient effort)  -      [facility] Name 09/13/21 1356          Upper Body Dressing Assessment/Training    [place] Level (Upper Body Dressing)  upper body dressing skills;standby assist  -LF     Row Name 09/13/21 1356          Lower Body Dressing Assessment/Training    [place] Level (Lower Body Dressing)  lower body dressing skills;minimum assist (75% patient effort)  -LF     Row Name 09/13/21 1356          Grooming Assessment/Training    [place] Level (Grooming)  grooming skills;set up  -LF     Row Name 09/13/21 [facility]6          Self-Feeding Assessment/Training    [place] Level (Feeding)  feeding skills;set up  -LF     Row Name 09/13/21 [facility]          Toileting Assessment/Training    [place] Level (Toileting)  toileting skills;dependent (less than 25% patient effort)  -     Comment (Toileting)  Male purewick currently in place.  -       User Key  (r) = Recorded By, (t) = Taken By, (c) = Cosigned By    Initials Name Provider Type     Jessica Gerber OT Occupational Therapist        Obj/Interventions     [facility] Name 09/13/21 [facility]9          Sensory Assessment (Somatosensory)    Sensory Assessment (Somatosensory)  UE sensation intact  -LF     Row Name 09/13/21 1359          Vision Assessment/Intervention    Visual Impairment/Limitations  WFL;corrective lenses for reading  -LF     Row Name 09/13/21 1359          Range of Motion Comprehensive    General Range of Motion  no range of motion deficits identified  -LF     Row Name 09/13/21 1359          Strength Comprehensive (MMT)    Comment, General Manual Muscle Testing (MMT) Assessment  4/5 bilateral upper extremities  -LF     Row Name 09/13/21 1359          Motor Skills    Motor Skills  coordination;functional endurance  -LF     Coordination  WFL Right hand dominant  -LF     Functional Endurance  Fair-  -LF     Row Name 09/13/21 1359          Balance    Balance Assessment  sitting dynamic balance;standing dynamic balance  -     Dynamic Sitting Balance   WFL;unsupported;sitting in chair;sitting, edge of bed  -LF     Dynamic Standing Balance  mild impairment;supported;standing  -LF       User Key  (r) = Recorded By, (t) = Taken By, (c) = Cosigned By    Initials Name Provider Type    LF Jessica Gerber, OT Occupational Therapist        Goals/Plan     Row Name 09/13/21 1401          Bed Mobility Goal 1 (OT)    Activity/Assistive Device (Bed Mobility Goal 1, OT)  bed mobility activities, all  -LF     [place] Level/Cues Needed (Bed Mobility Goal 1, OT)  modified independence  -LF     Time Frame (Bed Mobility Goal 1, OT)  long term goal (LTG);10 days  -     Row Name 09/13/21 1401          Transfer Goal 1 (OT)    Activity/Assistive Device (Transfer Goal 1, OT)  transfers, all;walker, rolling  -LF     [place] Level/Cues Needed (Transfer Goal 1, OT)  supervision required  -LF     Time Frame (Transfer Goal 1, OT)  long term goal (LTG);10 days  -     Row Name 09/13/21 1401          Bathing Goal 1 (OT)    Activity/Device (Bathing Goal 1, OT)  bathing skills, all  -LF     [place] Level/Cues Needed (Bathing Goal 1, OT)  supervision required  -LF     Time Frame (Bathing Goal 1, OT)  long term goal (LTG);10 days  -     Row Name 09/13/21 1401          Dressing Goal 1 (OT)    Activity/Device (Dressing Goal 1, OT)  dressing skills, all  -LF     [place]/Cues Needed (Dressing Goal 1, OT)  supervision required  -LF     Time Frame (Dressing Goal 1, OT)  long term goal (LTG);10 days  -     Row Name 09/13/21 1401          Toileting Goal 1 (OT)    Activity/Device (Toileting Goal 1, OT)  toileting skills, all  -LF     [place] Level/Cues Needed (Toileting Goal 1, OT)  supervision required  -LF     Time Frame (Toileting Goal 1, OT)  long term goal (LTG);10 days  -     Row Name 09/13/21 1401          Grooming Goal 1 (OT)    Activity/Device (Grooming Goal 1, OT)  grooming skills, all  -LF     [place] (Grooming Goal 1, OT)  supervision required  -LF      Time Frame (Grooming Goal 1, OT)  long term goal (LTG);10 days  -     Row Name 09/13/21 1401          Therapy Assessment/Plan (OT)    Planned Therapy Interventions (OT)  activity tolerance training;patient/caregiver education/training;BADL retraining;functional balance retraining;occupation/activity based interventions;strengthening exercise;transfer/mobility retraining  -       User Key  (r) = Recorded By, (t) = Taken By, (c) = Cosigned By    Initials Name Provider Type     Jessica Gerber OT Occupational Therapist        Clinical Impression     [facility] Name 09/13/21 1401          Pain Assessment    Additional Documentation  Pain Scale: FACES Pre/Post-Treatment (Group)  -[facility] Name 09/13/21 1401          Pain Scale: FACES Pre/Post-Treatment    Pain: FACES Scale, Pretreatment  0-->no hurt  -     Posttreatment Pain Rating  0-->no hurt  -[facility] Name 09/13/21 1401          Plan of Care Review    Plan of Care Reviewed With  patient  -     Progress  no change  -     Outcome Summary  Patient presents with limitations in self-care, functional transfers, balance, and endurance. He would benefit from continued skilled occupational therapy services to maximize ADL performance and return home safely and independently.  -     Row Name 09/13/21 1401          Therapy Assessment/Plan (OT)    Patient/Family Therapy Goal Statement (OT)  To maximize independence.  -     Rehab Potential (OT)  good, to achieve stated therapy goals  -     Criteria for Skilled Therapeutic Interventions Met (OT)  yes;meets criteria;skilled treatment is necessary  -     Therapy Frequency (OT)  5 times/wk  -     Row Name 09/13/21 1401          Therapy Plan Review/Discharge Plan (OT)    Equipment Needs Upon Discharge (OT)  walker, rolling;commode chair  -     Anticipated Discharge Disposition (OT)  inpatient rehabilitation facility;skilled nursing facility  -     Row Name 09/13/21 1401          Vital Signs    O2 Delivery Pre  Treatment  nasal cannula  -LF     O2 Delivery Intra Treatment  nasal cannula  -LF     O2 Delivery Post Treatment  nasal cannula  -LF       User Key  (r) = Recorded By, (t) = Taken By, (c) = Cosigned By    Initials Name Provider Type    Jessica Rushing OT Occupational Therapist        Outcome Measures     Row Name 09/13/21 1403          How much help from another is currently needed...    Putting on and taking off regular lower body clothing?  3  -LF     Bathing (including washing, rinsing, and drying)  3  -LF     Toileting (which includes using toilet bed pan or urinal)  3  -LF     Putting on and taking off regular upper body clothing  4  -LF     Taking care of personal grooming (such as brushing teeth)  4  -LF     Eating meals  4  -LF     AM-PAC 6 Clicks Score (OT)  21  -LF     Row Name 09/13/21 1403          Functional Assessment    Outcome Measure Options  AM-PAC 6 Clicks Daily Activity (OT);Optimal Instrument  -LF     Row Name 09/13/21 1403          Optimal Instrument    Optimal Instrument  Optimal - 3  -LF     Bending/Stooping  2  -LF     Standing  2  -LF     Reaching  1  -LF     From the list, choose the 3 activities you would most like to be able to do without any difficulty  Bending/stooping;Standing;Reaching  -LF     Total Score Optimal - 3  5  -LF       User Key  (r) = Recorded By, (t) = Taken By, (c) = Cosigned By    Initials Name Provider Type    Jessica Rushing OT Occupational Therapist          Occupational Therapy Education                 Title: PT OT SLP Therapies (In Progress)     Topic: Occupational Therapy (In Progress)     Point: ADL training (In Progress)     Description:   Instruct learner(s) on proper safety adaptation and remediation techniques during self care or transfers.   Instruct in proper use of assistive devices.              Learning Progress Summary           Patient Acceptance, E,TB, NR by  at 9/13/2021 1404                   Point: Home exercise program (Not Started)      Description:   Instruct learner(s) on appropriate technique for monitoring, assisting and/or progressing therapeutic exercises/activities.              Learner Progress:  Not documented in this visit.          Point: Precautions (In Progress)     Description:   Instruct learner(s) on prescribed precautions during self-care and functional transfers.              Learning Progress Summary           Patient Acceptance, E,TB, NR by  at 9/13/2021 1404                   Point: Body mechanics (In Progress)     Description:   Instruct learner(s) on proper positioning and spine alignment during self-care, functional mobility activities and/or exercises.              Learning Progress Summary           Patient Acceptance, E,TB, NR by  at 9/13/2021 1404                               User Key     Initials Effective Dates Name Provider Type Discipline     06/16/21 -  Jessica Gerber OT Occupational Therapist OT              OT Recommendation and Plan  Planned Therapy Interventions (OT): activity tolerance training, patient/caregiver education/training, BADL retraining, functional balance retraining, occupation/activity based interventions, strengthening exercise, transfer/mobility retraining  Therapy Frequency (OT): 5 times/wk  Plan of Care Review  Plan of Care Reviewed With: patient  Progress: no change  Outcome Summary: Patient presents with limitations in self-care, functional transfers, balance, and endurance. He would benefit from continued skilled occupational therapy services to maximize ADL performance and return home safely and independently.     Time Calculation:   Time Calculation- OT     Row Name 09/13/21 1404             Time Calculation- OT    OT Received On  09/13/21  -LF      OT Goal Re-Cert Due Date  09/22/21  -         Untimed Charges    OT Eval/Re-eval Minutes  50  -LF         Total Minutes    Untimed Charges Total Minutes  50  -LF       Total Minutes  50  -LF        User Key  (r) = Recorded By,  (t) = Taken By, (c) = Cosigned By    Initials Name Provider Type     Jessica Gerber, SHARAD Occupational Therapist        Therapy Charges for Today     Code Description Service Date Service Provider Modifiers Qty    [id number]  OT EVAL LOW COMPLEXITY 4 9/13/2021 Jessica Gerber OT GO 1               Jessica Gerber OT  9/13/2021

## 2021-09-13 NOTE — PLAN OF CARE
Goal Outcome Evaluation:           Progress: improving  Outcome Summary: Pt alert and oriented this shift. Pt continues to be on O2 at 2L. No new issues.

## 2021-09-13 NOTE — PLAN OF CARE
Goal Outcome Evaluation:  Plan of Care Reviewed With: patient        Progress: no change  Outcome Summary: Pt on 2L NC. Pt still intermittenly confused to time, place and situation. Upgraded to soft diet, no issues. Purewick removed. No acute events. VSS.

## 2021-09-14 LAB
ALBUMIN SERPL-MCNC: 3 G/DL (ref 3.5–5.2)
ALBUMIN/GLOB SERPL: 1 G/DL
ALP SERPL-CCNC: 357 U/L (ref 39–117)
ALT SERPL W P-5'-P-CCNC: 94 U/L (ref 1–41)
ANION GAP SERPL CALCULATED.3IONS-SCNC: 8.4 MMOL/L (ref 5–15)
AST SERPL-CCNC: 83 U/L (ref 1–40)
BASOPHILS # BLD AUTO: 0.03 10*3/MM3 (ref 0–0.2)
BASOPHILS NFR BLD AUTO: 0.2 % (ref 0–1.5)
BILIRUB SERPL-MCNC: 1 MG/DL (ref 0–1.2)
BUN SERPL-MCNC: 27 MG/DL (ref 8–23)
BUN/CREAT SERPL: 39.7 (ref 7–25)
CALCIUM SPEC-SCNC: 9.1 MG/DL (ref 8.6–10.5)
CHLORIDE SERPL-SCNC: 92 MMOL/L (ref 98–107)
CO2 SERPL-SCNC: 29.6 MMOL/L (ref 22–29)
CREAT SERPL-MCNC: 0.68 MG/DL (ref 0.76–1.27)
DEPRECATED RDW RBC AUTO: 53.4 FL (ref 37–54)
EOSINOPHIL # BLD AUTO: 0.02 10*3/MM3 (ref 0–0.4)
EOSINOPHIL NFR BLD AUTO: 0.1 % (ref 0.3–6.2)
ERYTHROCYTE [DISTWIDTH] IN BLOOD BY AUTOMATED COUNT: 15.7 % (ref 12.3–15.4)
GFR SERPL CREATININE-BSD FRML MDRD: 119 ML/MIN/1.73
GLOBULIN UR ELPH-MCNC: 3.1 GM/DL
GLUCOSE SERPL-MCNC: 185 MG/DL (ref 65–99)
HCT VFR BLD AUTO: 32.7 % (ref 37.5–51)
HGB BLD-MCNC: 11.6 G/DL (ref 13–17.7)
IMM GRANULOCYTES # BLD AUTO: 0.71 10*3/MM3 (ref 0–0.05)
IMM GRANULOCYTES NFR BLD AUTO: 4.3 % (ref 0–0.5)
LYMPHOCYTES # BLD AUTO: 1.95 10*3/MM3 (ref 0.7–3.1)
LYMPHOCYTES NFR BLD AUTO: 11.9 % (ref 19.6–45.3)
MAGNESIUM SERPL-MCNC: 1.8 MG/DL (ref 1.6–2.4)
MCH RBC QN AUTO: 33.2 PG (ref 26.6–33)
MCHC RBC AUTO-ENTMCNC: 35.5 G/DL (ref 31.5–35.7)
MCV RBC AUTO: 93.7 FL (ref 79–97)
MONOCYTES # BLD AUTO: 1.08 10*3/MM3 (ref 0.1–0.9)
MONOCYTES NFR BLD AUTO: 6.6 % (ref 5–12)
NEUTROPHILS NFR BLD AUTO: 12.63 10*3/MM3 (ref 1.7–7)
NEUTROPHILS NFR BLD AUTO: 76.9 % (ref 42.7–76)
NRBC BLD AUTO-RTO: 0 /100 WBC (ref 0–0.2)
PHOSPHATE SERPL-MCNC: 3.5 MG/DL (ref 2.5–4.5)
PLAT MORPH BLD: NORMAL
PLATELET # BLD AUTO: 261 10*3/MM3 (ref 140–450)
PMV BLD AUTO: 12.8 FL (ref 6–12)
POTASSIUM SERPL-SCNC: 4.3 MMOL/L (ref 3.5–5.2)
PROT SERPL-MCNC: 6.1 G/DL (ref 6–8.5)
RBC # BLD AUTO: 3.49 10*6/MM3 (ref 4.14–5.8)
RBC MORPH BLD: NORMAL
SODIUM SERPL-SCNC: 130 MMOL/L (ref 136–145)
WBC # BLD AUTO: 16.42 10*3/MM3 (ref 3.4–10.8)
WBC MORPH BLD: NORMAL

## 2021-09-14 PROCEDURE — 99233 SBSQ HOSP IP/OBS HIGH 50: CPT | Performed by: INTERNAL MEDICINE

## 2021-09-14 PROCEDURE — 94799 UNLISTED PULMONARY SVC/PX: CPT

## 2021-09-14 PROCEDURE — 25010000002 DEXAMETHASONE PER 1 MG: Performed by: INTERNAL MEDICINE

## 2021-09-14 PROCEDURE — 80053 COMPREHEN METABOLIC PANEL: CPT | Performed by: INTERNAL MEDICINE

## 2021-09-14 PROCEDURE — 83735 ASSAY OF MAGNESIUM: CPT | Performed by: INTERNAL MEDICINE

## 2021-09-14 PROCEDURE — 25010000002 CEFEPIME PER 500 MG: Performed by: INTERNAL MEDICINE

## 2021-09-14 PROCEDURE — 84100 ASSAY OF PHOSPHORUS: CPT | Performed by: INTERNAL MEDICINE

## 2021-09-14 PROCEDURE — 25010000002 FUROSEMIDE PER 20 MG: Performed by: INTERNAL MEDICINE

## 2021-09-14 PROCEDURE — 85025 COMPLETE CBC W/AUTO DIFF WBC: CPT | Performed by: INTERNAL MEDICINE

## 2021-09-14 PROCEDURE — 25010000002 ENOXAPARIN PER 10 MG: Performed by: INTERNAL MEDICINE

## 2021-09-14 PROCEDURE — 85007 BL SMEAR W/DIFF WBC COUNT: CPT | Performed by: INTERNAL MEDICINE

## 2021-09-14 RX ORDER — CHOLECALCIFEROL (VITAMIN D3) 125 MCG
10 CAPSULE ORAL NIGHTLY PRN
Status: DISCONTINUED | OUTPATIENT
Start: 2021-09-14 | End: 2021-09-16 | Stop reason: HOSPADM

## 2021-09-14 RX ADMIN — FUROSEMIDE 40 MG: 10 INJECTION, SOLUTION INTRAMUSCULAR; INTRAVENOUS at 10:09

## 2021-09-14 RX ADMIN — BUDESONIDE 0.5 MG: 0.5 INHALANT ORAL at 08:41

## 2021-09-14 RX ADMIN — BUDESONIDE 0.5 MG: 0.5 INHALANT ORAL at 19:57

## 2021-09-14 RX ADMIN — NICOTINE 1 PATCH: 21 PATCH, EXTENDED RELEASE TRANSDERMAL at 10:09

## 2021-09-14 RX ADMIN — Medication 10 MG: at 20:19

## 2021-09-14 RX ADMIN — ENOXAPARIN SODIUM 40 MG: 40 INJECTION SUBCUTANEOUS at 10:09

## 2021-09-14 RX ADMIN — MAGNESIUM OXIDE TAB 400 MG (241.3 MG ELEMENTAL MG) 400 MG: 400 (241.3 MG) TAB at 10:07

## 2021-09-14 RX ADMIN — CEFEPIME HYDROCHLORIDE 2 G: 2 INJECTION, POWDER, FOR SOLUTION INTRAVENOUS at 00:41

## 2021-09-14 RX ADMIN — FAMOTIDINE 20 MG: 10 INJECTION INTRAVENOUS at 20:19

## 2021-09-14 RX ADMIN — MIDODRINE HYDROCHLORIDE 5 MG: 2.5 TABLET ORAL at 07:44

## 2021-09-14 RX ADMIN — MIDODRINE HYDROCHLORIDE 5 MG: 2.5 TABLET ORAL at 17:45

## 2021-09-14 RX ADMIN — MAGNESIUM OXIDE TAB 400 MG (241.3 MG ELEMENTAL MG) 400 MG: 400 (241.3 MG) TAB at 20:19

## 2021-09-14 RX ADMIN — ARFORMOTEROL TARTRATE 15 MCG: 15 SOLUTION RESPIRATORY (INHALATION) at 08:40

## 2021-09-14 RX ADMIN — FUROSEMIDE 40 MG: 10 INJECTION, SOLUTION INTRAMUSCULAR; INTRAVENOUS at 17:45

## 2021-09-14 RX ADMIN — CEFEPIME HYDROCHLORIDE 2 G: 2 INJECTION, POWDER, FOR SOLUTION INTRAVENOUS at 10:10

## 2021-09-14 RX ADMIN — FAMOTIDINE 20 MG: 10 INJECTION INTRAVENOUS at 10:08

## 2021-09-14 RX ADMIN — DEXAMETHASONE SODIUM PHOSPHATE 10 MG: 10 INJECTION INTRAMUSCULAR; INTRAVENOUS at 10:12

## 2021-09-14 RX ADMIN — ACETAMINOPHEN 650 MG: 325 TABLET ORAL at 20:20

## 2021-09-14 RX ADMIN — CEFEPIME HYDROCHLORIDE 2 G: 2 INJECTION, POWDER, FOR SOLUTION INTRAVENOUS at 17:45

## 2021-09-14 RX ADMIN — MIDODRINE HYDROCHLORIDE 5 MG: 2.5 TABLET ORAL at 12:28

## 2021-09-14 RX ADMIN — ARFORMOTEROL TARTRATE 15 MCG: 15 SOLUTION RESPIRATORY (INHALATION) at 19:57

## 2021-09-14 NOTE — PLAN OF CARE
Goal Outcome Evaluation:         Pt stable this shift, no c/o of pain, shortness of air or nausea. Pt has remained on 2L NC. Continue current care. Case management and SW working on discharge planning. Per MD, likely discharge tomorrow.

## 2021-09-14 NOTE — PROGRESS NOTES
[facility]   Hospitalist Progress Note  Date: 2021  Patient Name: Conrad Guerrier  : 1960  MRN: [id number]  Date of admission: 2021  Consultants:   -Pulmonology/Critical Care: Dr. Laz Uribe, Dr. Herbert Oliva    Subjective   Subjective     Chief Complaint: Shortness of breath    Summary:   Conrad Guerrier is a 61 y.o. male with past medical history significant for neuropathy, alcohol abuse who presented to ED with altered mental status.  Evaluation in the ED significant for patient requiring submental O2 to maintain O2 sats greater than 90%, chest x-ray obtained and demonstrated bilateral infiltrates consistent with multifocal pneumonia.  COVID-19 testing obtained and returned positive.  Patient had worsening hypoxia on morning of 2021 and required NIPPV.  Pulmonology consulted to assist in care.  Patient's respiratory status improved and patient's O2 requirement weaned as tolerated.  Treatment with Decadron, remdesivir, bronchodilators and cefepime continued during admission.  Patient went into SVT with heart rates in the 190s and started agonal breathing.  RRT was called and the patient was intubated and moved to the ICU on .  Patient able to be successfully extubated on 2021.  Patient did require Levophed but this was able to be weaned off successfully.  Patient's mentation slowly improved and patient supplemental O2 requirement also weaned.  Patient completed 7 days of antibiotic therapy with cefepime.    Interval Followup:   No acute events overnight.  Remains on 2 L nasal cannula with adequate saturations.  Patient admits he is still intermittently confused.  Still forgetful about why he was in the hospital in the first place but was reminded he had Covid and respiratory failure.  Appetite improved this morning, otherwise no new complaints.  Is unsure if he wants to go to rehab or home.    Review of Systems   Denies nausea or vomiting    Objective   Objective     Vitals:    Temp:  [97.5 °F (36.4 °C)-98 °F (36.7 °C)] 97.9 °F (36.6 °C)  Heart Rate:  [86-99] 91  Resp:  [18-20] 18  BP: ()/(61-73) 105/73  Flow (L/min):  [2] 2  Physical Exam   Gen: No acute distress, resting in bed eating breakfast  HEENT: MMM, diffuse bruising appreciated over patient's nose and forehead, improving  Neck: Supple, Trachea midline  Resp: CTAB, no w/r/r, no increase in work of breathing  Card: RRR, No m/r/g  Abd: Soft, Nontender, Nondistended, + bowel sounds  Ext: No cyanosis, No clubbing  Neuro: CN II-XII grossly intact, No focal deficits appreciated, AAO x 3    Result Review    Result Review:  I have personally reviewed the results from the time of this admission to 9/14/2021 15:50 EDT and agree with these findings:  [x]  Laboratory:   [x]  Microbiology:   []  Radiology:   [x]  EKG/Telemetry:   []  Cardiology/Vascular:    []  Pathology:  []  Old records:  []  Other:  Telemetry reviewed showed normal sinus rhythm    Assessment/Plan   Assessment / Plan     Assessment:  COVID-19 pneumonia  Acute hypoxic respiratory failure requiring intubation on 9/8  Viral pneumonia  Toxic/metabolic encephalopathy  Therapeutic drug level monitoring, remdesivir  Sinus tachycardia  Alcohol abuse  Hypomagnesemia  Hypokalemia  Alcohol withdrawal  Mechanical fall    Continue supplemental O2 to maintain sats greater than 90%, wean as tolerated  Mild transaminitis, continue to trend LFTs daily  Continue dexamethasone and plan to treat for total of 10 days (Day 10/10), will transition to prednisone tomorrow and taper at discharge  Continue with IV Lasix 40 mg twice daily, strict I's and O's  Continue IV cefepime, treat for total of 7 days   Continue nebulizers, bronchopulmonary hygiene protocol  Continue thiamine, multivitamin and folic acid  Discussed with social work regarding disposition.  PT/OT recommend rehab, patient is unsure if he wants rehab.  Social work plans to discuss with the patient and his family as he is  likely able to be discharged in the next 24-48 hours  Monitor electrolytes and renal function with CMP and magnesium level in the AM  Monitor WBC and Hgb with CBC in the AM  Clinical course will dictate further management     DVT Prophylaxis: Lovenox  GI Prophylaxis: Pepcid  Diet: Regular  Dispo: PT/OT consulted  Code Status: Full code      discussed case with: Bedside RN    DVT prophylaxis:  Medical DVT prophylaxis orders are present.    CODE STATUS:   Level Of Support Discussed With: Patient  Code Status: CPR  Medical Interventions (Level of Support Prior to Arrest): Full      Electronically signed by Tang Newsome MD, 09/14/21, 3:50 PM EDT.

## 2021-09-14 NOTE — NURSING NOTE
Patient follow up for dry friction and scabbing to bilateral buttocks.  Pt now resolved of scabbing.  Skin is intact with pink blanchable tissue.  Recommend continuing skin prevention measures with use of barrier cream.  Jean,rn,wcc

## 2021-09-14 NOTE — PLAN OF CARE
Goal Outcome Evaluation:           Progress: improving  Outcome Summary: Pt on 2L NC, no SOA noted, pt resting in bed, pt intermittenly confused.

## 2021-09-14 NOTE — DISCHARGE PLACEMENT REQUEST
"Conrad Guerrier (61 y.o. Male)     Date of Birth Social Security Number Address Home Phone MRN    1960  [id number]  [place] [address] [phone] [id number]    Mormonism Marital Status          Pentecostal        Admission Date Admission Type Admitting Provider Attending Provider Department, Room/Bed    9/4/21 Emergency Tang Richards MD Klimchak, Nicholas, MD [facility] 4TH FLOOR MEDICAL TELEMETRY UNIT, 426/1    Discharge Date Discharge Disposition Discharge Destination                       Attending Provider: Tang Richards MD    Allergies: No Known Allergies    Isolation: Contact Air   Infection: COVID (confirmed) (09/05/21)   Code Status: CPR    Ht: 177.8 cm (70\")   Wt: 75.1 kg (165 lb 9.1 oz)    Admission Cmt: None   Principal Problem: None                Active Insurance as of 9/4/2021     Primary Coverage     Payor Plan Insurance Group Employer/Plan Group    [facility]      Payor Plan Address Payor Plan Phone Number Payor Plan Fax Number Effective Dates    [address] [phone]  8/9/2021 - None Entered    [facility] [id number]       Subscriber Name Subscriber Birth Date Member ID       CONRAD GUERRIER 1960 [id number]                 Emergency Contacts      (Rel.) Home Phone Work Phone Mobile Phone    Ciaran Guerrier (Son) [phone] -- --              "

## 2021-09-15 ENCOUNTER — APPOINTMENT (OUTPATIENT)
Dept: GENERAL RADIOLOGY | Facility: HOSPITAL | Age: 61
End: 2021-09-15

## 2021-09-15 LAB
ALBUMIN SERPL-MCNC: 3 G/DL (ref 3.5–5.2)
ALBUMIN/GLOB SERPL: 1.1 G/DL
ALP SERPL-CCNC: 371 U/L (ref 39–117)
ALT SERPL W P-5'-P-CCNC: 123 U/L (ref 1–41)
ANION GAP SERPL CALCULATED.3IONS-SCNC: 10.2 MMOL/L (ref 5–15)
AST SERPL-CCNC: 95 U/L (ref 1–40)
BASOPHILS # BLD AUTO: 0.09 10*3/MM3 (ref 0–0.2)
BASOPHILS NFR BLD AUTO: 0.5 % (ref 0–1.5)
BILIRUB SERPL-MCNC: 0.8 MG/DL (ref 0–1.2)
BILIRUB UR QL STRIP: NEGATIVE
BUN SERPL-MCNC: 27 MG/DL (ref 8–23)
BUN/CREAT SERPL: 45.8 (ref 7–25)
CALCIUM SPEC-SCNC: 9.2 MG/DL (ref 8.6–10.5)
CHLORIDE SERPL-SCNC: 94 MMOL/L (ref 98–107)
CLARITY UR: CLEAR
CO2 SERPL-SCNC: 26.8 MMOL/L (ref 22–29)
COLOR UR: YELLOW
CREAT SERPL-MCNC: 0.59 MG/DL (ref 0.76–1.27)
DEPRECATED RDW RBC AUTO: 50.8 FL (ref 37–54)
EOSINOPHIL # BLD AUTO: 0.01 10*3/MM3 (ref 0–0.4)
EOSINOPHIL NFR BLD AUTO: 0.1 % (ref 0.3–6.2)
ERYTHROCYTE [DISTWIDTH] IN BLOOD BY AUTOMATED COUNT: 15.2 % (ref 12.3–15.4)
GFR SERPL CREATININE-BSD FRML MDRD: 140 ML/MIN/1.73
GLOBULIN UR ELPH-MCNC: 2.8 GM/DL
GLUCOSE SERPL-MCNC: 184 MG/DL (ref 65–99)
GLUCOSE UR STRIP-MCNC: ABNORMAL MG/DL
HCT VFR BLD AUTO: 30 % (ref 37.5–51)
HGB BLD-MCNC: 10.9 G/DL (ref 13–17.7)
HGB UR QL STRIP.AUTO: NEGATIVE
IMM GRANULOCYTES # BLD AUTO: 0.72 10*3/MM3 (ref 0–0.05)
IMM GRANULOCYTES NFR BLD AUTO: 3.6 % (ref 0–0.5)
KETONES UR QL STRIP: ABNORMAL
LEUKOCYTE ESTERASE UR QL STRIP.AUTO: NEGATIVE
LYMPHOCYTES # BLD AUTO: 1.98 10*3/MM3 (ref 0.7–3.1)
LYMPHOCYTES NFR BLD AUTO: 9.9 % (ref 19.6–45.3)
MAGNESIUM SERPL-MCNC: 1.4 MG/DL (ref 1.6–2.4)
MCH RBC QN AUTO: 33.5 PG (ref 26.6–33)
MCHC RBC AUTO-ENTMCNC: 36.3 G/DL (ref 31.5–35.7)
MCV RBC AUTO: 92.3 FL (ref 79–97)
MONOCYTES # BLD AUTO: 1.43 10*3/MM3 (ref 0.1–0.9)
MONOCYTES NFR BLD AUTO: 7.2 % (ref 5–12)
NEUTROPHILS NFR BLD AUTO: 15.69 10*3/MM3 (ref 1.7–7)
NEUTROPHILS NFR BLD AUTO: 78.7 % (ref 42.7–76)
NITRITE UR QL STRIP: NEGATIVE
NRBC BLD AUTO-RTO: 0 /100 WBC (ref 0–0.2)
PH UR STRIP.AUTO: 6 [PH] (ref 5–8)
PLAT MORPH BLD: NORMAL
PLATELET # BLD AUTO: 261 10*3/MM3 (ref 140–450)
PMV BLD AUTO: 13.3 FL (ref 6–12)
POTASSIUM SERPL-SCNC: 3.9 MMOL/L (ref 3.5–5.2)
PROT SERPL-MCNC: 5.8 G/DL (ref 6–8.5)
PROT UR QL STRIP: ABNORMAL
RBC # BLD AUTO: 3.25 10*6/MM3 (ref 4.14–5.8)
RBC MORPH BLD: NORMAL
SODIUM SERPL-SCNC: 131 MMOL/L (ref 136–145)
SP GR UR STRIP: 1.02 (ref 1–1.03)
UROBILINOGEN UR QL STRIP: ABNORMAL
WBC # BLD AUTO: 19.92 10*3/MM3 (ref 3.4–10.8)
WBC MORPH BLD: NORMAL

## 2021-09-15 PROCEDURE — 85025 COMPLETE CBC W/AUTO DIFF WBC: CPT | Performed by: INTERNAL MEDICINE

## 2021-09-15 PROCEDURE — 94799 UNLISTED PULMONARY SVC/PX: CPT

## 2021-09-15 PROCEDURE — 25010000002 FUROSEMIDE PER 20 MG: Performed by: INTERNAL MEDICINE

## 2021-09-15 PROCEDURE — 25010000003 MAGNESIUM SULFATE 4 GM/100ML SOLUTION: Performed by: INTERNAL MEDICINE

## 2021-09-15 PROCEDURE — 99233 SBSQ HOSP IP/OBS HIGH 50: CPT | Performed by: INTERNAL MEDICINE

## 2021-09-15 PROCEDURE — 25010000002 ENOXAPARIN PER 10 MG: Performed by: INTERNAL MEDICINE

## 2021-09-15 PROCEDURE — 25010000002 CEFEPIME PER 500 MG: Performed by: INTERNAL MEDICINE

## 2021-09-15 PROCEDURE — 71045 X-RAY EXAM CHEST 1 VIEW: CPT

## 2021-09-15 PROCEDURE — 85007 BL SMEAR W/DIFF WBC COUNT: CPT | Performed by: INTERNAL MEDICINE

## 2021-09-15 PROCEDURE — 81003 URINALYSIS AUTO W/O SCOPE: CPT | Performed by: INTERNAL MEDICINE

## 2021-09-15 PROCEDURE — 83735 ASSAY OF MAGNESIUM: CPT | Performed by: INTERNAL MEDICINE

## 2021-09-15 PROCEDURE — 63710000001 PREDNISONE PER 1 MG: Performed by: INTERNAL MEDICINE

## 2021-09-15 PROCEDURE — 80053 COMPREHEN METABOLIC PANEL: CPT | Performed by: INTERNAL MEDICINE

## 2021-09-15 RX ORDER — PREDNISONE 20 MG/1
20 TABLET ORAL
Status: DISCONTINUED | OUTPATIENT
Start: 2021-09-15 | End: 2021-09-16 | Stop reason: HOSPADM

## 2021-09-15 RX ORDER — MAGNESIUM SULFATE HEPTAHYDRATE 40 MG/ML
4 INJECTION, SOLUTION INTRAVENOUS ONCE
Status: COMPLETED | OUTPATIENT
Start: 2021-09-15 | End: 2021-09-15

## 2021-09-15 RX ADMIN — Medication 10 MG: at 20:32

## 2021-09-15 RX ADMIN — BUDESONIDE 0.5 MG: 0.5 INHALANT ORAL at 07:35

## 2021-09-15 RX ADMIN — CEFEPIME HYDROCHLORIDE 2 G: 2 INJECTION, POWDER, FOR SOLUTION INTRAVENOUS at 09:43

## 2021-09-15 RX ADMIN — MIDODRINE HYDROCHLORIDE 5 MG: 2.5 TABLET ORAL at 12:49

## 2021-09-15 RX ADMIN — ENOXAPARIN SODIUM 40 MG: 40 INJECTION SUBCUTANEOUS at 09:42

## 2021-09-15 RX ADMIN — NICOTINE 1 PATCH: 21 PATCH, EXTENDED RELEASE TRANSDERMAL at 09:45

## 2021-09-15 RX ADMIN — ARFORMOTEROL TARTRATE 15 MCG: 15 SOLUTION RESPIRATORY (INHALATION) at 19:37

## 2021-09-15 RX ADMIN — ARFORMOTEROL TARTRATE 15 MCG: 15 SOLUTION RESPIRATORY (INHALATION) at 07:35

## 2021-09-15 RX ADMIN — MIDODRINE HYDROCHLORIDE 5 MG: 2.5 TABLET ORAL at 09:43

## 2021-09-15 RX ADMIN — ACETAMINOPHEN 650 MG: 325 TABLET ORAL at 20:31

## 2021-09-15 RX ADMIN — FAMOTIDINE 20 MG: 10 INJECTION INTRAVENOUS at 09:44

## 2021-09-15 RX ADMIN — FUROSEMIDE 40 MG: 10 INJECTION, SOLUTION INTRAMUSCULAR; INTRAVENOUS at 09:43

## 2021-09-15 RX ADMIN — MAGNESIUM OXIDE TAB 400 MG (241.3 MG ELEMENTAL MG) 400 MG: 400 (241.3 MG) TAB at 09:43

## 2021-09-15 RX ADMIN — MAGNESIUM SULFATE 4 G: 4 INJECTION INTRAVENOUS at 09:44

## 2021-09-15 RX ADMIN — PREDNISONE 20 MG: 20 TABLET ORAL at 09:43

## 2021-09-15 RX ADMIN — BUDESONIDE 0.5 MG: 0.5 INHALANT ORAL at 19:37

## 2021-09-15 RX ADMIN — MAGNESIUM OXIDE TAB 400 MG (241.3 MG ELEMENTAL MG) 400 MG: 400 (241.3 MG) TAB at 20:32

## 2021-09-15 RX ADMIN — CEFEPIME HYDROCHLORIDE 2 G: 2 INJECTION, POWDER, FOR SOLUTION INTRAVENOUS at 01:07

## 2021-09-15 RX ADMIN — FAMOTIDINE 20 MG: 10 INJECTION INTRAVENOUS at 20:32

## 2021-09-15 NOTE — PLAN OF CARE
Problem: Adult Inpatient Plan of Care  Goal: Plan of Care Review  Outcome: Ongoing, Progressing  Flowsheets (Taken 9/15/2021 0254)  Progress: no change  Plan of Care Reviewed With: patient  Outcome Summary: Pt is on 1.5L NC, no SOA noted. Pt is agitated this shift, verbally aggressive. Redirection used, pt expressing desire to go home. Will continue to monitor. AM labs ordered.  Goal: Patient-Specific Goal (Individualized)  Outcome: Ongoing, Progressing  Goal: Absence of Hospital-Acquired Illness or Injury  Outcome: Ongoing, Progressing  Intervention: Identify and Manage Fall Risk  Recent Flowsheet Documentation  Taken 9/15/2021 0200 by Thorn, Erinne, RN  Safety Promotion/Fall Prevention: safety round/check completed  Taken 9/15/2021 0000 by Thorn, Erinne, RN  Safety Promotion/Fall Prevention: safety round/check completed  Taken 9/14/2021 2200 by Thorn, Erinne, RN  Safety Promotion/Fall Prevention: safety round/check completed  Taken 9/14/2021 2000 by Thorn, Erinne, RN  Safety Promotion/Fall Prevention: safety round/check completed  Intervention: Prevent Skin Injury  Recent Flowsheet Documentation  Taken 9/14/2021 2020 by Thorn, Erinne, RN  Body Position:   position changed independently   turned  Skin Protection: skin sealant/moisture barrier applied  Intervention: Prevent and Manage VTE (venous thromboembolism) Risk  Recent Flowsheet Documentation  Taken 9/14/2021 2020 by Thorn, Erinne, RN  VTE Prevention/Management: (see MAR) other (see comments)  Intervention: Prevent Infection  Recent Flowsheet Documentation  Taken 9/15/2021 0200 by Thorn, Erinne, RN  Infection Prevention:   single patient room provided   rest/sleep promoted   hand hygiene promoted  Taken 9/15/2021 0000 by Thorn, Erinne, RN  Infection Prevention:   single patient room provided   rest/sleep promoted   hand hygiene promoted  Taken 9/14/2021 2200 by Thorn, Erinne, RN  Infection Prevention:   single patient room provided   rest/sleep promoted    hand hygiene promoted  Taken 9/14/2021 2000 by Thorn, Erinne, RN  Infection Prevention:   single patient room provided   rest/sleep promoted   hand hygiene promoted  Goal: Optimal Comfort and Wellbeing  Outcome: Ongoing, Progressing  Goal: Readiness for Transition of Care  Outcome: Ongoing, Progressing     Problem: Fluid Imbalance (Pneumonia)  Goal: Fluid Balance  Outcome: Ongoing, Progressing  Intervention: Monitor and Manage Fluid Balance  Recent Flowsheet Documentation  Taken 9/14/2021 2020 by Thorn, Erinne, RN  Fluid/Electrolyte Management: fluids provided     Problem: Infection (Pneumonia)  Goal: Resolution of Infection Signs and Symptoms  Outcome: Ongoing, Progressing  Intervention: Prevent Infection Progression  Recent Flowsheet Documentation  Taken 9/15/2021 0200 by Thorn, Erinne, RN  Isolation Precautions: airborne precautions maintained  Taken 9/15/2021 0000 by Thorn, Erinne, RN  Isolation Precautions: airborne precautions maintained  Taken 9/14/2021 2200 by Thorn, Erinne, RN  Isolation Precautions: airborne precautions maintained  Taken 9/14/2021 2020 by Thorn, Erinne, RN  Infection Management: aseptic technique maintained  Taken 9/14/2021 2000 by Thorn, Erinne, RN  Isolation Precautions: airborne precautions maintained     Problem: Respiratory Compromise (Pneumonia)  Goal: Effective Oxygenation and Ventilation  Outcome: Ongoing, Progressing  Intervention: Promote Airway Secretion Clearance  Recent Flowsheet Documentation  Taken 9/14/2021 2020 by Thorn, Erinne, RN  Breathing Techniques/Airway Clearance: deep/controlled cough encouraged  Cough And Deep Breathing: done independently per patient  Intervention: Optimize Oxygenation and Ventilation  Recent Flowsheet Documentation  Taken 9/14/2021 2020 by Thorn, Erinne, RN  Head of Bed (HOB): HOB elevated  Airway/Ventilation Management: airway patency maintained     Problem: Skin Injury Risk Increased  Goal: Skin Health and Integrity  Outcome: Ongoing,  Progressing  Intervention: Optimize Skin Protection  Recent Flowsheet Documentation  Taken 9/14/2021 2020 by Thorn, Erinne, RN  Pressure Reduction Techniques:   heels elevated off bed   frequent weight shift encouraged  Head of Bed (HOB): HOB elevated  Pressure Reduction Devices: positioning supports utilized  Skin Protection: skin sealant/moisture barrier applied     Problem: Fall Injury Risk  Goal: Absence of Fall and Fall-Related Injury  Outcome: Ongoing, Progressing  Intervention: Identify and Manage Contributors to Fall Injury Risk  Recent Flowsheet Documentation  Taken 9/15/2021 0200 by Thorn, Erinne, RN  Medication Review/Management: medications reviewed  Taken 9/15/2021 0000 by Thorn, Erinne, RN  Medication Review/Management: medications reviewed  Taken 9/14/2021 2200 by Thorn, Erinne, RN  Medication Review/Management: medications reviewed  Taken 9/14/2021 2020 by Thorn, Erinne, RN  Self-Care Promotion: independence encouraged  Taken 9/14/2021 2000 by Thorn, Erinne, RN  Medication Review/Management: medications reviewed  Intervention: Promote Injury-Free Environment  Recent Flowsheet Documentation  Taken 9/15/2021 0200 by Thorn, Erinne, RN  Safety Promotion/Fall Prevention: safety round/check completed  Taken 9/15/2021 0000 by Thorn, Erinne, RN  Safety Promotion/Fall Prevention: safety round/check completed  Taken 9/14/2021 2200 by Thorn, Erinne, RN  Safety Promotion/Fall Prevention: safety round/check completed  Taken 9/14/2021 2000 by Thorn, Erinne, RN  Safety Promotion/Fall Prevention: safety round/check completed     Problem: Noninvasive Ventilation Acute  Goal: Effective Unassisted Ventilation and Oxygenation  Outcome: Ongoing, Progressing  Intervention: Monitor and Manage Noninvasive Ventilation  Recent Flowsheet Documentation  Taken 9/14/2021 2020 by Thorn, Erinne, RN  Airway/Ventilation Management: airway patency maintained     Problem: Restraint, Nonbehavioral (Nonviolent)  Goal: Discontinuation  Criteria Achieved  Outcome: Ongoing, Progressing  Intervention: Implement Least-restrictive Safety Strategies  Recent Flowsheet Documentation  Taken 9/14/2021 2020 by Thorn, Erinne, RN  Diversional Activities: television  Goal: Personal Dignity and Safety Maintained  Outcome: Ongoing, Progressing  Intervention: Protect Skin and Joint Integrity  Recent Flowsheet Documentation  Taken 9/14/2021 2020 by Thorn, Erinne, RN  Body Position:   position changed independently   turned  Range of Motion:   ROM (range of motion) performed   active ROM (range of motion) encouraged   Goal Outcome Evaluation:  Plan of Care Reviewed With: patient        Progress: no change  Outcome Summary: Pt is on 1.5L NC, no SOA noted. Pt is agitated this shift, verbally aggressive. Redirection used, pt expressing desire to go home. Will continue to monitor. AM labs ordered.

## 2021-09-15 NOTE — PROGRESS NOTES
[facility]   Hospitalist Progress Note  Date: 9/15/2021  Patient Name: Conrad Guerrier  : 1960  MRN: [id number]  Date of admission: 2021  Consultants:   -Pulmonology/Critical Care: Dr. Laz Uribe, Dr. Herbert Oliva    Subjective   Subjective     Chief Complaint: Shortness of breath    Summary:   Conrad Guerrier is a 61 y.o. male with past medical history significant for neuropathy, alcohol abuse who presented to ED with altered mental status.  Evaluation in the ED significant for patient requiring submental O2 to maintain O2 sats greater than 90%, chest x-ray obtained and demonstrated bilateral infiltrates consistent with multifocal pneumonia.  COVID-19 testing obtained and returned positive.  Patient had worsening hypoxia on morning of 2021 and required NIPPV.  Pulmonology consulted to assist in care.  Patient's respiratory status improved and patient's O2 requirement weaned as tolerated.  Treatment with Decadron, remdesivir, bronchodilators and cefepime continued during admission.  Patient went into SVT with heart rates in the 190s and started agonal breathing.  RRT was called and the patient was intubated and moved to the ICU on .  Patient able to be successfully extubated on 2021.  Patient did require Levophed but this was able to be weaned off successfully.  Patient's mentation slowly improved and patient supplemental O2 requirement also weaned.  Patient completed 7 days of antibiotic therapy with cefepime.    Interval Followup:   No acute events overnight.  Oxygen has been weaned down to 1 L.  Still states he feels tired and intermittently confused.  Is not sure if muscular rehab or just go home at discharge.  He thinks he prefers to go home but does not have transportation.  Otherwise no new complaints.    Review of Systems   Denies chest pain or headache    Objective   Objective     Vitals:   Temp:  [97.3 °F (36.3 °C)-97.5 °F (36.4 °C)] 97.3 °F (36.3 °C)  Heart Rate:  [87-98]  98  Resp:  [18-20] 20  BP: ()/(49-84) 82/60  Flow (L/min):  [1.5] 1.5  Physical Exam   Gen: No acute distress, resting in bed eating breakfast  HEENT: MMM, diffuse bruising appreciated over patient's nose and forehead, improving  Neck: Supple, Trachea midline  Resp: CTAB, no w/r/r, no increase in work of breathing  Card: RRR, No m/r/g  Abd: Soft, Nontender, Nondistended, + bowel sounds  Ext: No cyanosis, No clubbing  Neuro: CN II-XII grossly intact, No focal deficits appreciated, AAO x 3    Result Review    Result Review:  I have personally reviewed the results from the time of this admission to 9/15/2021 16:28 EDT and agree with these findings:  [x]  Laboratory:   [x]  Microbiology:   []  Radiology:   [x]  EKG/Telemetry:   []  Cardiology/Vascular:    []  Pathology:  []  Old records:  []  Other:  Telemetry reviewed showed normal sinus rhythm    Assessment/Plan   Assessment / Plan     Assessment:  COVID-19 pneumonia  Acute hypoxic respiratory failure requiring intubation and mechanical ventilation   Viral pneumonia  Toxic/metabolic encephalopathy  Therapeutic drug level monitoring, remdesivir  Sinus tachycardia  Alcohol abuse  Hypomagnesemia  Hypokalemia  Alcohol withdrawal  Mechanical fall    Continue supplemental O2 to maintain sats greater than 90%, wean as tolerated  Mild transaminitis, continue to trend LFTs daily  Transition to prednisone 20 mg oral daily and taper at discharge   Continue with IV Lasix 40 mg twice daily, strict I's and O's  Continue IV cefepime, treat for total of 7 days   Continue nebulizers, bronchopulmonary hygiene protocol  Continue thiamine, multivitamin and folic acid  Walk test today  Discussed case with social work.  They have reached out to the patient's girlfriend who states she is willing to care for the patient at home.  His insurance does not cover a rehab stay so he would prefer to go home.  Plan is to likely discharge home tomorrow  Monitor electrolytes and renal function  with CMP   Monitor WBC and Hgb with CBC in the AM  Clinical course will dictate further management     DVT Prophylaxis: Lovenox  GI Prophylaxis: Pepcid  Diet: Regular  Dispo: PT/OT consulted  Code Status: Full code      discussed case with: Bedside RN    DVT prophylaxis:  Medical DVT prophylaxis orders are present.    CODE STATUS:   Level Of Support Discussed With: Patient  Code Status: CPR  Medical Interventions (Level of Support Prior to Arrest): Full      Electronically signed by Tang Newsome MD, 09/15/21, 4:28 PM EDT.

## 2021-09-15 NOTE — SIGNIFICANT NOTE
09/15/21 1203   Plan   Final Note RN informed SW that pts Girlfriend contacted RN. SW discussed pts case with Girlfriend-Carolee. Carolee states that pt lives at home with her at [address] [place] [address] A in [place]. Carolee states that she will be home. Pt does not have transportation. SW to provide pt cab voucher. TAWNY also contacted the [facility] and provided pts information. They will be calling pt tomorrow to discuss any potential resources for pt.

## 2021-09-16 ENCOUNTER — READMISSION MANAGEMENT (OUTPATIENT)
Dept: CALL CENTER | Facility: HOSPITAL | Age: 61
End: 2021-09-16

## 2021-09-16 VITALS
HEIGHT: 70 IN | RESPIRATION RATE: 20 BRPM | BODY MASS INDEX: 23.7 KG/M2 | DIASTOLIC BLOOD PRESSURE: 73 MMHG | WEIGHT: 165.57 LBS | SYSTOLIC BLOOD PRESSURE: 109 MMHG | HEART RATE: 104 BPM | TEMPERATURE: 97.4 F | OXYGEN SATURATION: 98 %

## 2021-09-16 PROBLEM — J96.01 ACUTE HYPOXEMIC RESPIRATORY FAILURE DUE TO COVID-19 (HCC): Status: ACTIVE | Noted: 2021-09-16

## 2021-09-16 PROBLEM — U07.1 ACUTE HYPOXEMIC RESPIRATORY FAILURE DUE TO COVID-19 (HCC): Status: ACTIVE | Noted: 2021-09-16

## 2021-09-16 LAB
ANION GAP SERPL CALCULATED.3IONS-SCNC: 9.6 MMOL/L (ref 5–15)
BASOPHILS # BLD AUTO: 0.1 10*3/MM3 (ref 0–0.2)
BASOPHILS NFR BLD AUTO: 0.5 % (ref 0–1.5)
BUN SERPL-MCNC: 25 MG/DL (ref 8–23)
BUN/CREAT SERPL: 46.3 (ref 7–25)
CALCIUM SPEC-SCNC: 9.5 MG/DL (ref 8.6–10.5)
CHLORIDE SERPL-SCNC: 97 MMOL/L (ref 98–107)
CO2 SERPL-SCNC: 29.4 MMOL/L (ref 22–29)
CREAT SERPL-MCNC: 0.54 MG/DL (ref 0.76–1.27)
DEPRECATED RDW RBC AUTO: 57.7 FL (ref 37–54)
EOSINOPHIL # BLD AUTO: 0.08 10*3/MM3 (ref 0–0.4)
EOSINOPHIL NFR BLD AUTO: 0.4 % (ref 0.3–6.2)
ERYTHROCYTE [DISTWIDTH] IN BLOOD BY AUTOMATED COUNT: 15.9 % (ref 12.3–15.4)
GFR SERPL CREATININE-BSD FRML MDRD: >150 ML/MIN/1.73
GLUCOSE SERPL-MCNC: 242 MG/DL (ref 65–99)
HCT VFR BLD AUTO: 32.6 % (ref 37.5–51)
HGB BLD-MCNC: 11.1 G/DL (ref 13–17.7)
IMM GRANULOCYTES # BLD AUTO: 0.67 10*3/MM3 (ref 0–0.05)
IMM GRANULOCYTES NFR BLD AUTO: 3.6 % (ref 0–0.5)
LYMPHOCYTES # BLD AUTO: 2.66 10*3/MM3 (ref 0.7–3.1)
LYMPHOCYTES NFR BLD AUTO: 14.1 % (ref 19.6–45.3)
MAGNESIUM SERPL-MCNC: 1.8 MG/DL (ref 1.6–2.4)
MCH RBC QN AUTO: 33.8 PG (ref 26.6–33)
MCHC RBC AUTO-ENTMCNC: 34 G/DL (ref 31.5–35.7)
MCV RBC AUTO: 99.4 FL (ref 79–97)
MONOCYTES # BLD AUTO: 1.49 10*3/MM3 (ref 0.1–0.9)
MONOCYTES NFR BLD AUTO: 7.9 % (ref 5–12)
NEUTROPHILS NFR BLD AUTO: 13.8 10*3/MM3 (ref 1.7–7)
NEUTROPHILS NFR BLD AUTO: 73.5 % (ref 42.7–76)
NRBC BLD AUTO-RTO: 0 /100 WBC (ref 0–0.2)
PLATELET # BLD AUTO: 182 10*3/MM3 (ref 140–450)
PMV BLD AUTO: 13.2 FL (ref 6–12)
POTASSIUM SERPL-SCNC: 4.6 MMOL/L (ref 3.5–5.2)
RBC # BLD AUTO: 3.28 10*6/MM3 (ref 4.14–5.8)
SODIUM SERPL-SCNC: 136 MMOL/L (ref 136–145)
WBC # BLD AUTO: 18.8 10*3/MM3 (ref 3.4–10.8)

## 2021-09-16 PROCEDURE — 80048 BASIC METABOLIC PNL TOTAL CA: CPT | Performed by: INTERNAL MEDICINE

## 2021-09-16 PROCEDURE — 94799 UNLISTED PULMONARY SVC/PX: CPT

## 2021-09-16 PROCEDURE — 83735 ASSAY OF MAGNESIUM: CPT | Performed by: INTERNAL MEDICINE

## 2021-09-16 PROCEDURE — 99239 HOSP IP/OBS DSCHRG MGMT >30: CPT | Performed by: INTERNAL MEDICINE

## 2021-09-16 PROCEDURE — 25010000002 ENOXAPARIN PER 10 MG: Performed by: INTERNAL MEDICINE

## 2021-09-16 PROCEDURE — 85025 COMPLETE CBC W/AUTO DIFF WBC: CPT | Performed by: INTERNAL MEDICINE

## 2021-09-16 PROCEDURE — 25010000002 FUROSEMIDE PER 20 MG: Performed by: INTERNAL MEDICINE

## 2021-09-16 PROCEDURE — 97110 THERAPEUTIC EXERCISES: CPT

## 2021-09-16 PROCEDURE — 97116 GAIT TRAINING THERAPY: CPT

## 2021-09-16 PROCEDURE — 63710000001 PREDNISONE PER 1 MG: Performed by: INTERNAL MEDICINE

## 2021-09-16 RX ORDER — PREDNISONE 20 MG/1
20 TABLET ORAL
Qty: 7 TABLET | Refills: 0 | Status: SHIPPED | OUTPATIENT
Start: 2021-09-17

## 2021-09-16 RX ORDER — ALBUTEROL SULFATE 90 UG/1
2 AEROSOL, METERED RESPIRATORY (INHALATION) EVERY 4 HOURS PRN
Qty: 8.5 G | Refills: 9 | Status: SHIPPED | OUTPATIENT
Start: 2021-09-16

## 2021-09-16 RX ADMIN — ARFORMOTEROL TARTRATE 15 MCG: 15 SOLUTION RESPIRATORY (INHALATION) at 08:02

## 2021-09-16 RX ADMIN — BUDESONIDE 0.5 MG: 0.5 INHALANT ORAL at 08:02

## 2021-09-16 RX ADMIN — MIDODRINE HYDROCHLORIDE 5 MG: 2.5 TABLET ORAL at 12:18

## 2021-09-16 RX ADMIN — FAMOTIDINE 20 MG: 10 INJECTION INTRAVENOUS at 09:02

## 2021-09-16 RX ADMIN — MIDODRINE HYDROCHLORIDE 5 MG: 2.5 TABLET ORAL at 08:59

## 2021-09-16 RX ADMIN — ENOXAPARIN SODIUM 40 MG: 40 INJECTION SUBCUTANEOUS at 09:00

## 2021-09-16 RX ADMIN — MAGNESIUM OXIDE TAB 400 MG (241.3 MG ELEMENTAL MG) 400 MG: 400 (241.3 MG) TAB at 08:59

## 2021-09-16 RX ADMIN — NICOTINE 1 PATCH: 21 PATCH, EXTENDED RELEASE TRANSDERMAL at 09:03

## 2021-09-16 RX ADMIN — PREDNISONE 20 MG: 20 TABLET ORAL at 08:59

## 2021-09-16 NOTE — DISCHARGE SUMMARY
[facility]         HOSPITALIST  DISCHARGE SUMMARY    Patient Name: Conrad Guerrier  : 1960  MRN: [id number]    Date of Admission: 2021  Date of Discharge: 2021  Primary Care Physician: Mariah Felipe APRN    Consults     Date and Time Order Name Status Description    2021  1:50 PM Inpatient Pulmonology Consult      2021  9:27 AM Inpatient Pulmonology Consult Completed     2021 11:54 PM Hospitalist (on-call MD unless specified) Completed           Active and Resolved Hospital Problems:  Active Hospital Problems    Diagnosis POA   • Acute hypoxemic respiratory failure due to COVID-19 (CMS/[facility]) [U07.1, J96.01] Unknown   • Suspected COVID-19 virus infection [Z20.822] Yes      Resolved Hospital Problems   No resolved problems to display.   COVID-19 pneumonia  Acute hypoxic respiratory failure requiring intubation and mechanical ventilation   Viral pneumonia  Toxic/metabolic encephalopathy  Therapeutic drug level monitoring, remdesivir  Supraventricular tachycardia  Septic shock secondary to Covid pneumonia  Sinus tachycardia  Alcohol abuse  Hypomagnesemia  Hypokalemia  Alcohol withdrawal  Mechanical fall    Hospital Course     Hospital Course:  Conrad Guerrier is a 61 y.o. unvaccinated male with past medical history significant for neuropathy, alcohol abuse who presented to ED on  with altered mental status.  Evaluation in the ED significant for patient requiring submental O2 to maintain O2 sats greater than 90%, chest x-ray obtained and demonstrated bilateral infiltrates consistent with multifocal pneumonia.  COVID-19 testing obtained and returned positive.  Patient had worsening hypoxia on morning of 2021 and required NIPPV.  Pulmonology consulted to assist in care.  Initially, the patient's respiratory status improved and patient's O2 requirement weaned as tolerated.  Treatment with Decadron, remdesivir, bronchodilators and cefepime continued during  admission.  Patient went into SVT with heart rates in the 190s and started agonal breathing.  RRT was called and the patient was intubated and moved to the ICU on 9/8.  Patient able to be successfully extubated on 09/09/2021.  Patient did require Levophed but this was able to be weaned off successfully.  Patient's mentation slowly improved and patient supplemental O2 requirement also weaned.  Patient completed 7 days of antibiotic therapy with cefepime.  He eventually was able to be weaned off oxygen completely.  Disposition proved to be difficult, PT did recommend rehab but the patient refused.  He did still have intermittent confusion which was secondary to Covid infection and prior alcohol abuse.  His girlfriend states that she will care for him at home.  He was discharged home in stable condition on 9/16/2021.  Recommend follow-up with PCP in 1 week.      Day of Discharge     Vital Signs:  Temp:  [97.3 °F (36.3 °C)-97.9 °F (36.6 °C)] 97.8 °F (36.6 °C)  Heart Rate:  [] 93  Resp:  [16-20] 16  BP: ()/(49-80) 102/74  Physical Exam:   Gen: NAD, WDWN  ENT: PERRL, EOMI, healing ecchymosis of left orbit  CV: RRR no MRG  Pulm: CTAB, no w/r/r  GI: Abd soft, NTND, +bs  Neuro: Moving all extremities spontaneously, CN II-XII grossly intact   Psych: A&O*3, normal mood and affect  Skin: No lesions or rashes noted     Discharge Details        Discharge Medications      New Medications      Instructions Start Date   albuterol sulfate  (90 Base) MCG/ACT inhaler  Commonly known as: PROVENTIL HFA;VENTOLIN HFA;PROAIR HFA   2 puffs, Inhalation, Every 4 Hours PRN      predniSONE 20 MG tablet  Commonly known as: DELTASONE   20 mg, Oral, Daily With Breakfast   Start Date: September 17, 2021        Continue These Medications      Instructions Start Date   magnesium oxide 400 MG tablet  Commonly known as: MAG-OX   400 mg, Oral, Daily      multivitamin capsule   1 capsule, Oral, Daily             No Known  Allergies    Discharge Disposition:  Home or Self Care    Diet:  Hospital:  Diet Order   Procedures   • Diet Soft Texture; [employer]       Discharge Activity:   Activity Instructions     Activity as Tolerated            CODE STATUS:  Code Status and Medical Interventions:   Ordered at: 09/05/21 0836     Level Of Support Discussed With:    Patient     Code Status:    CPR     Medical Interventions (Level of Support Prior to Arrest):    Full         No future appointments.    Additional Instructions for the Follow-ups that You Need to Schedule     Discharge Follow-up with PCP   As directed       Currently Documented PCP:    Mariah Felipe APRN    PCP Phone Number:    [phone]     Follow Up Details: 3-5 days               Pertinent  and/or Most Recent Results     PROCEDURES:   None    LAB RESULTS:      Lab 09/16/21  0517 09/15/21  0353 09/14/21  0500 09/13/21  0422 09/12/21  0515 09/11/21  0537 09/10/21  0534   WBC 18.80* 19.92* 16.42* 13.49* 11.66*   < > 15.17*   HEMOGLOBIN 11.1* 10.9* 11.6* 11.4* 12.3*   < > 12.6*   HEMATOCRIT 32.6* 30.0* 32.7* 31.8* 33.8*   < > 34.1*   PLATELETS 182 261 261 272 282   < > 252   NEUTROS ABS 13.80* 15.69* 12.63* 8.77* 8.00*  --  11.26*   IMMATURE GRANS (ABS) 0.67* 0.72* 0.71*  --  0.55*  --  0.57*   LYMPHS ABS 2.66 1.98 1.95  --  1.93  --  2.02   MONOS ABS 1.49* 1.43* 1.08*  --  1.07*  --  1.28*   EOS ABS 0.08 0.01 0.02  --  0.04  --  0.01   MCV 99.4* 92.3 93.7 93.5 91.1   < > 93.4   CRP  --   --   --   --  3.54*  --   --     < > = values in this interval not displayed.         Lab 09/16/21  0517 09/15/21  0353 09/14/21  0500 09/13/21  0422 09/12/21  0515 09/11/21  0537 09/11/21  0537 09/10/21  0534 09/10/21  0534   SODIUM  --  131* 130* 131* 133*  --  136   < > 138   POTASSIUM  --  3.9 4.3 4.4 4.6  --  4.2   < > 3.5   CHLORIDE  --  94* 92* 95* 96*  --  98   < > 101   CO2  --  26.8 29.6* 26.3 28.2  --  26.2   < > 27.0   ANION GAP  --  10.2 8.4 9.7 8.8  --  11.8   < > 10.0   BUN   --  27* 27* 22 26*  --  23   < > 17   CREATININE  --  0.59* 0.68* 0.51* 0.48*  --  0.52*   < > 0.55*   GLUCOSE  --  184* 185* 114* 153*  --  179*   < > 152*   CALCIUM  --  9.2 9.1 8.8 9.0  --  7.9*   < > 7.8*   MAGNESIUM 1.8 1.4* 1.8 1.6 1.5*   < > 1.7   < > 1.6   PHOSPHORUS  --   --  3.5  --   --   --   --   --  1.7*    < > = values in this interval not displayed.         Lab 09/15/21  0353 09/14/21  0500 09/13/21  0422 09/12/21  0515 09/11/21  0537   TOTAL PROTEIN 5.8* 6.1 5.4* 5.8* 5.4*   ALBUMIN 3.00* 3.00* 2.60* 2.80* 2.50*   GLOBULIN 2.8 3.1 2.8 3.0 2.9   ALT (SGPT) 123* 94* 66* 44* 36   AST (SGOT) 95* 83* 84* 65* 68*   BILIRUBIN 0.8 1.0 0.9 1.1 1.0   ALK PHOS 371* 357* 361* 394* 386*         Lab 09/13/21  0422   PROBNP 1,156.0*             Lab 09/12/21  0515   FERRITIN 2,513.00*         Brief Urine Lab Results  (Last result in the past 365 days)      Color   Clarity   Blood   Leuk Est   Nitrite   Protein   CREAT   Urine HCG        09/15/21 2032 Yellow Clear Negative Negative Negative Trace             Microbiology Results (last 10 days)     Procedure Component Value - Date/Time    Legionella Antigen, Urine - Urine, Urine, Clean Catch [[id number]]  (Normal) Collected: 09/10/21 2331    Lab Status: Final result Specimen: Urine, Clean Catch Updated: 09/11/21 0750     LEGIONELLA ANTIGEN, URINE Negative    S. Pneumo Ag Urine or CSF - Urine, Urine, Clean Catch [[id number]]  (Normal) Collected: 09/10/21 2331    Lab Status: Final result Specimen: Urine, Clean Catch Updated: 09/11/21 0750     Strep Pneumo Ag Negative    MRSA Screen, PCR (Inpatient) - Swab, Nares [[id number]]  (Normal) Collected: 09/08/21 2230    Lab Status: Final result Specimen: Swab from Nares Updated: 09/09/21 0204     MRSA PCR No MRSA Detected                       Results for orders placed during the hospital encounter of 09/04/21    Adult Transthoracic Echo Limited W/ Cont if Necessary Per Protocol    Interpretation Summary  Normal left ventricular  systolic function.  No significant valvular abnormalities noted.  Technical limited study.     CT Head Without Contrast    Result Date: 9/5/2021  Narrative: PROCEDURE: CT HEAD WO CONTRAST  COMPARISON: 12/20/2019.  INDICATIONS: BRUISING TO LEFT EYE AFTER FALL TODAY.  PROTOCOL:   Standard imaging protocol performed    RADIATION:   MA and/or KV was adjusted to minimize radiation dose.    TECHNIQUE: After obtaining the patient's consent, 218 CT images were obtained without non-ionic intravenous contrast material.  FINDINGS: Considerable motion artifact obscures detail, despite repeating several images.  There are acute bilateral nasal bone fractures.  These fractures are comminuted and displaced with overlying acute contusion and subcutaneous emphysema.  An acute displaced fracture involves the anterior bony nasal septum.  Acute maxillary (antral) fractures may be present.  Air-fluid levels are seen in the bilateral maxillary antra.  In the differential diagnosis would be acute sinusitis.  There are opacified bilateral ethmoid air cells.  Air-fluid levels and mucosal thickening involve the bilateral sphenoid paranasal sinuses.  An air-fluid level involves the left frontal paranasal sinus.  There is opacification of the left middle ear cleft including the left middle ear cavity in the left mastoid air cells, which may represent age-indeterminate congestive and/or inflammatory change.  No gross nonenhanced CT evidence of an acute fracture in this region.  There is an incidental 5 mm osteoma in the anterior left ethmoid air cells, seen previously, and unchanged.  The optic globes appear intact.  No acute retrobulbar hemorrhage is seen.   Grossly, no acute intracranial hemorrhage is suggested.  Grossly, no acute infarct is seen.  No definite midline shift or acute intracranial herniation syndrome.  The ventricular size and configuration are probably within normal limits for the patient's age, similar to the prior study.   Mild diffuse prominence of the extra-axial spaces is seen and suggests central atrophy.  There may be mild chronic small vessel ischemia/infarction.  Arterial calcifications are present.  Acute left-sided periorbital contusion is suggested.   CONCLUSION:  1. The study is very limited due to patient motion artifact.  2. Grossly, no acute brain abnormality is appreciated.  No definite acute intracranial hemorrhage.  No definite acute infarct.  No definite acute skull fracture.  3. There are acute bilateral displaced nasal bone fractures.  There is an acute anterior nasal septal fracture.  4. Air-fluid levels involve the paranasal sinuses without a definite acute fracture appreciated. 5. There is acute left periorbital contusion.  6. Consider repeating this head CT study and performing a maxillofacial CT examination when the patient is able to remain motionless for the study.  Consider conscious sedation if clinically indicated.  7. Please see above comments for further detail.     FELICIA GHOSH JR, MD       Electronically Signed and Approved By: FELICIA GHOSH JR, MD on 9/05/2021 at 2:47             XR Chest 1 View    Result Date: 9/15/2021  Narrative: PROCEDURE: XR CHEST 1 VW  COMPARISON: [facility], , XR CHEST 1 VW, 9/09/2021, 7:15.  INDICATIONS: increasing wbc  FINDINGS:  Heart size is within normal limits.  Interval extubation and removal of the left subclavian catheter.  No dense consolidation.  No visible pleural fluid or pneumothorax.  Partially imaged irregularity of the left humeral neck, similar to the prior.  CONCLUSION: Interval extubation and removal of the left subclavian catheter.  Otherwise no change from 9/9/2021       ALBA NUNEZ MD       Electronically Signed and Approved By: ALBA NUNEZ MD on 9/15/2021 at 9:39             XR Chest 1 View    Result Date: 9/9/2021  Narrative: PROCEDURE: XR CHEST 1 VW  COMPARISON: [facility], CR, XR CHEST 1 VW, 9/08/2021, 13:28.   INDICATIONS: COVID-19  FINDINGS:  Heart size unchanged.  ET tube and left subclavian catheter are unchanged.  Bilateral pulmonary opacities, slightly improved in the left upper lung zone.  No new dense consolidation.  No pneumothorax.  CONCLUSION: No significant change from yesterday       ALBA NUNEZ MD       Electronically Signed and Approved By: ALBA NUNEZ MD on 9/09/2021 at 8:57             XR Chest 1 View    Result Date: 9/8/2021  Narrative: PROCEDURE: XR CHEST 1 VW  COMPARISON: [facility], CR, XR CHEST 1 VW, 9/05/2021, 9:57.  [facility], CR, XR CHEST 1 VW, 9/04/2021, 22:48.  [facility], CR, XR CHEST 1 VW, 9/06/2021, 4:53.  INDICATIONS: line placement,  FINDINGS:   New endotracheal tube in good position with the tip in the midtrachea.  Left subclavian central line tip is in the mid SVC.  No evidence of pneumothorax.  The heart size is within normal limits.  There is increasing patchy airspace opacity in the lung fields.  There is an old proximal left humerus fracture.  IMPRESSION: 1. Increasing patchy airspace opacity in the lung fields possibly secondary to multifocal pneumonia or pulmonary edema.  2. Endotracheal tube and left subclavian central line are in good position.  No pneumothorax.   SUKHDEV MONTALVO MD       Electronically Signed and Approved By: SUKHDEV MONTALVO MD on 9/08/2021 at 13:41             XR Chest 1 View    Result Date: 9/6/2021  Narrative: PROCEDURE: XR CHEST 1 VW  COMPARISON: [facility], CR, XR CHEST 1 VW, 9/05/2021, 9:57.  INDICATIONS: hypoxic respiratory failure  FINDINGS: A single AP upright portable chest radiograph reveals decreased bilateral infiltrates since 9/5/2021at 0957 hours.  Improved visualization of the diaphragm is seen.  No pneumothorax or pneumomediastinum is appreciated.  External artifacts obscure detail.  There is an old healed proximal left humeral fracture, better seen on the current study.  There may be  borderline cardiac enlargement.  There is slight chronic asymmetric elevation of the right diaphragm, seen previously.  Degenerative changes involve the imaged spine.  CONCLUSION: Favorable progression is suggested radiographically with decreased bilateral infiltrates since 9/5/2021 at 0957 hours.    FELICIA GHOSH JR, MD       Electronically Signed and Approved By: FELICIA GHOSH JR, MD on 9/06/2021 at 6:05             XR Chest 1 View    Result Date: 9/5/2021  Narrative: PROCEDURE: XR CHEST 1 VW  COMPARISON: [facility], , XR CHEST 1 VW, 9/04/2021, 22:48.  INDICATIONS: resp failure  FINDINGS:  Heart size and pulmonary vessels are within normal limits.  There is persistent patchy bilateral airspace disease which has worsened slightly in both lung bases.  No pleural effusion is seen.  There is no evidence of pneumothorax.  There postoperative changes of the left lung apex.  Bony thorax is unremarkable.  CONCLUSION:  1. Patchy bilateral airspace disease with interval worsening in basilar opacities.  Findings would be compatible with multifocal pneumonia.       VALARIE NUNEZ MD       Electronically Signed and Approved By: VALARIE NUNEZ MD on 9/05/2021 at 10:19             XR Chest 1 View    Result Date: 9/4/2021  Narrative: PROCEDURE: XR CHEST 1 VW  COMPARISON: [facility], CR, XR CHEST 1 VW, 8/09/2021, 8:44.  INDICATIONS: HYPOXIA.  FINDINGS: A single AP upright portable chest radiograph is provided for review.  New bilateral infiltrates are seen, greater on the left.  The findings may represent infectious multifocal pneumonia.  The patient is rotated to the left.  There is suspected chronic asymmetric elevation of the right diaphragm.  Probably no cardiac enlargement is present.  The thoracic aorta is atherosclerotic and ectatic.  There is an old healed proximal left humeral fracture.  No pneumothorax or pneumomediastinum.  There are postoperative changes of the upper to mid left thorax,  as before.  CONCLUSION: New bilateral infiltrates are seen.  The findings suggest infectious multifocal pneumonia.      FELICIA GHOSH JR, MD       Electronically Signed and Approved By: FELICIA GHOSH JR, MD on 9/04/2021 at 23:44             Adult Transthoracic Echo Limited W/ Cont if Necessary Per Protocol    Result Date: 9/10/2021  Narrative: Normal left ventricular systolic function. No significant valvular abnormalities noted. Technical limited study.          Time spent on Discharge including face to face service:  32 minutes    Electronically signed by Tang Newsome MD, 09/16/21, 1:15 PM EDT.

## 2021-09-16 NOTE — SIGNIFICANT NOTE
09/16/21 1354   Plan   Final Note Pt does not have prescription coverage. Meds were sent to [facility]. SW provided pt with a provision of meds. SW faxed to pharmacy. Pt will need to stop by pharmacy to get medications before discharge.

## 2021-09-16 NOTE — SIGNIFICANT NOTE
09/16/21 0943   Plan   Final Discharge Disposition Code 01 - home or self-care   Final Note Pt discharging home today. TAWNY placed cab voucher in pts chart. TAWNY placed call to pts Girlfriend-Carolee. Carolee states she will be home when pt arrives.

## 2021-09-16 NOTE — SIGNIFICANT NOTE
09/16/21 1115   Coping/Psychosocial   Observed Emotional State calm;cooperative   Verbalized Emotional State hopefulness   Trust Relationship/Rapport empathic listening provided   Involvement in Care interacting with patient   Spiritual Care   Spiritual Care Visit Type initial   Spiritual Care Source  initiative   Receptivity to Spiritual Care visit welcomed   Spiritual Care Interventions supportive conversation provided   Response to Spiritual Care receptive of support;thanks expressed   Use of Spiritual Resources non-Sikhism use of spiritual care   Spiritual Care Follow-Up follow-up, none required as presently assessed

## 2021-09-16 NOTE — THERAPY TREATMENT NOTE
Acute Care - Physical Therapy Treatment Note  [facility]     Patient Name: Conrad Guerrier  : 1960  MRN: [id number]  Today's Date: 2021      Visit Dx:     ICD-10-CM ICD-9-CM   1. Suspected COVID-19 virus infection  Z20.822 V01.79   2. Pneumonia due to infectious organism, unspecified laterality, unspecified part of lung  J18.9 486   3. Acute respiratory failure with hypoxia (CMS/HCC)  J96.01 518.81   4. Alcohol dependence with unspecified alcohol-induced disorder (CMS/HCC)  F10.29 303.90     291.9   5. Hypomagnesemia  E83.42 275.2   6. Decreased activities of daily living (ADL)  Z78.9 V49.89   7. Difficulty in walking  R26.2 719.7     Patient Active Problem List   Diagnosis   • Suspected COVID-19 virus infection   • Acute hypoxemic respiratory failure due to COVID-19 (CMS/HCC)     History reviewed. No pertinent past medical history.  Past Surgical History:   Procedure Laterality Date   • ABDOMINAL SURGERY          PT Assessment (last 12 hours)      PT Evaluation and Treatment     Row Name 21 1300          Physical Therapy Time and Intention    Subjective Information  no complaints  -DP     Document Type  therapy note (daily note)  -DP     Mode of Treatment  individual therapy;physical therapy  -DP     Patient Effort  good  -DP     Row Name 21 1300          Bed Mobility    Bed Mobility  bed mobility (all) activities;supine-sit  -DP     All Activities, [place] (Bed Mobility)  supervision  -DP     Supine-Sit [place] (Bed Mobility)  supervision  -DP     Row Name 21 1300          Transfers    Transfers  sit-stand transfer  -DP     Sit-Stand [place] (Transfers)  supervision  -DP     Row Name 21 1300          Gait/Stairs (Locomotion)    Gait/Stairs Locomotion  gait/ambulation assistive device  -DP     [place] Level (Gait)  standby assist  -DP     Assistive Device (Gait)  -- no AD  -DP     Distance in Feet (Gait)  20  -DP     Row Name 21 1300          Progress  Summary (PT)    Progress Toward Functional Goals (PT)  progress toward functional goals is good  -DP       User Key  (r) = Recorded By, (t) = Taken By, (c) = Cosigned By    Initials Name Provider Type    DP Rose Pinedo, PT Physical Therapist        Physical Therapy Education                 Title: PT OT SLP Therapies (Done)     Topic: Physical Therapy (Done)     Point: Mobility training (Done)     Learning Progress Summary           Patient Acceptance, E,TB,D, NR,DU by NN at 9/15/2021 1909    Acceptance, E,TB, VU by TOMAS at 9/13/2021 1414                   Point: Home exercise program (Done)     Learning Progress Summary           Patient Acceptance, E,TB,D, NR,DU by NN at 9/15/2021 1909    Acceptance, E,TB, VU by TOMAS at 9/13/2021 1414                   Point: Body mechanics (Done)     Learning Progress Summary           Patient Acceptance, E,TB,D, NR,DU by  at 9/15/2021 1909    Acceptance, E,TB, VU by TOMAS at 9/13/2021 1414                   Point: Precautions (Done)     Learning Progress Summary           Patient Acceptance, E,TB,D, NR,DU by  at 9/15/2021 1909    Acceptance, E,TB, VU by TOMAS at 9/13/2021 1414                               User Key     Initials Effective Dates Name Provider Type Discipline    NN 06/16/21 -  Haily Kelly, RN Registered Nurse Nurse    TOMAS 06/03/21 -  Javy Spring PT Physical Therapist PT            Bilateral Knee Ther-ex in sitting  Exercise  Reps  Sets    Long arc Quads   10 2   Short arc Quads  10 2   Heel Slides  10 2   Ankle Pumps  10 2   Quad sets  10 2   Glut sets  10 2   Straight leg raise  10 2          PT Recommendation and Plan     Progress Summary (PT)  Progress Toward Functional Goals (PT): progress toward functional goals is good  Outcome Measures     Row Name 09/16/21 1300 09/13/21 1413          How much help from another person do you currently need...    Turning from your back to your side while in flat bed without using bedrails?  4  -DP  3  -TOMAS     Moving from  lying on back to sitting on the side of a flat bed without bedrails?  4  -DP  3  -TOMAS     Moving to and from a bed to a chair (including a wheelchair)?  4  -DP  3  -TOMAS     Standing up from a chair using your arms (e.g., wheelchair, bedside chair)?  4  -DP  3  -TOMAS     Climbing 3-5 steps with a railing?  4  -DP  3  -TOMAS     To walk in hospital room?  4  -DP  3  -TOMAS     AM-PAC 6 Clicks Score (PT)  24  -DP  18  -TOMAS        Functional Assessment    Outcome Measure Options  AM-PAC 6 Clicks Basic Mobility (PT)  -DP  AM-PAC 6 Clicks Basic Mobility (PT)  -TOMAS       User Key  (r) = Recorded By, (t) = Taken By, (c) = Cosigned By    Initials Name Provider Type    Rose Flaherty, PT Physical Therapist    Javy Britt, PT Physical Therapist           Time Calculation:   PT Charges     Row Name 09/16/21 1313             Time Calculation    PT Received On  09/16/21  -DP         Timed Charges    31016 - PT Therapeutic Exercise Minutes  12  -DP      85170 - Gait Training Minutes   12  -DP         Total Minutes    Timed Charges Total Minutes  24  -DP       Total Minutes  24  -DP        User Key  (r) = Recorded By, (t) = Taken By, (c) = Cosigned By    Initials Name Provider Type    Rose Flaherty, PT Physical Therapist        Therapy Charges for Today     Code Description Service Date Service Provider Modifiers Qty    24051734571 HC PT THER PROC EA 15 MIN 9/16/2021 Rose Pinedo, PT GP 1    44699784850 HC GAIT TRAINING EA 15 MIN 9/16/2021 Rose Pinedo, PT GP 1          PT G-Codes  Outcome Measure Options: AM-PAC 6 Clicks Basic Mobility (PT)  AM-PAC 6 Clicks Score (PT): 24  AM-PAC 6 Clicks Score (OT): 21    Rose Pinedo PT  9/16/2021

## 2021-09-16 NOTE — PLAN OF CARE
Problem: Adult Inpatient Plan of Care  Goal: Plan of Care Review  Outcome: Ongoing, Progressing  Flowsheets (Taken 9/16/2021 0522)  Progress: improving  Plan of Care Reviewed With: patient  Outcome Summary: Pt is on RA, mood is calmer then previous evening. No acute changes this shift. Will continue to monitor.  Goal: Patient-Specific Goal (Individualized)  Outcome: Ongoing, Progressing  Goal: Absence of Hospital-Acquired Illness or Injury  Outcome: Ongoing, Progressing  Intervention: Identify and Manage Fall Risk  Recent Flowsheet Documentation  Taken 9/16/2021 0400 by Thorn, Erinne, RN  Safety Promotion/Fall Prevention: safety round/check completed  Taken 9/16/2021 0200 by Thorn, Erinne, RN  Safety Promotion/Fall Prevention: safety round/check completed  Taken 9/16/2021 0000 by Thorn, Erinne, RN  Safety Promotion/Fall Prevention: safety round/check completed  Taken 9/15/2021 2200 by Thorn, Erinne, RN  Safety Promotion/Fall Prevention: safety round/check completed  Taken 9/15/2021 2000 by Thorn, Erinne, RN  Safety Promotion/Fall Prevention: safety round/check completed  Intervention: Prevent Skin Injury  Recent Flowsheet Documentation  Taken 9/15/2021 1945 by Thorn, Erinne, RN  Body Position: position changed independently  Intervention: Prevent and Manage VTE (venous thromboembolism) Risk  Recent Flowsheet Documentation  Taken 9/15/2021 1945 by Thorn, Erinne, RN  VTE Prevention/Management: other (see comments)  Intervention: Prevent Infection  Recent Flowsheet Documentation  Taken 9/16/2021 0400 by Thorn, Erinne, RN  Infection Prevention: single patient room provided  Taken 9/16/2021 0200 by Thorn, Erinne, RN  Infection Prevention: single patient room provided  Taken 9/16/2021 0000 by Thorn, Erinne, RN  Infection Prevention: single patient room provided  Taken 9/15/2021 2200 by Thorn, Erinne, RN  Infection Prevention:   single patient room provided   rest/sleep promoted   hand hygiene promoted  Taken 9/15/2021  2000 by Thorn, Erinne, RN  Infection Prevention:   single patient room provided   rest/sleep promoted   hand hygiene promoted  Goal: Optimal Comfort and Wellbeing  Outcome: Ongoing, Progressing  Intervention: Provide Person-Centered Care  Recent Flowsheet Documentation  Taken 9/15/2021 1945 by Thorn, Erinne, RN  Trust Relationship/Rapport: care explained  Goal: Readiness for Transition of Care  Outcome: Ongoing, Progressing     Problem: Fluid Imbalance (Pneumonia)  Goal: Fluid Balance  Outcome: Ongoing, Progressing  Intervention: Monitor and Manage Fluid Balance  Recent Flowsheet Documentation  Taken 9/15/2021 1945 by Thorn, Erinne, RN  Fluid/Electrolyte Management: fluids provided     Problem: Infection (Pneumonia)  Goal: Resolution of Infection Signs and Symptoms  Outcome: Ongoing, Progressing  Intervention: Prevent Infection Progression  Recent Flowsheet Documentation  Taken 9/16/2021 0400 by Thorn, Erinne, RN  Isolation Precautions: airborne precautions maintained  Taken 9/16/2021 0200 by Thorn, Erinne, RN  Isolation Precautions: airborne precautions maintained  Taken 9/16/2021 0000 by Thorn, Erinne, RN  Isolation Precautions: airborne precautions maintained  Taken 9/15/2021 2200 by Thorn, Erinne, RN  Isolation Precautions: airborne precautions maintained  Taken 9/15/2021 2000 by Thorn, Erinne, RN  Isolation Precautions: airborne precautions maintained  Taken 9/15/2021 1945 by Thorn, Erinne, RN  Infection Management: aseptic technique maintained     Problem: Respiratory Compromise (Pneumonia)  Goal: Effective Oxygenation and Ventilation  Outcome: Ongoing, Progressing  Intervention: Promote Airway Secretion Clearance  Recent Flowsheet Documentation  Taken 9/15/2021 1945 by Thorn, Erinne, RN  Breathing Techniques/Airway Clearance: deep/controlled cough encouraged  Cough And Deep Breathing: done independently per patient  Intervention: Optimize Oxygenation and Ventilation  Recent Flowsheet Documentation  Taken  9/15/2021 1945 by Thorn, Erinne, RN  Head of Bed (HOB): HOB elevated  Airway/Ventilation Management: airway patency maintained     Problem: Skin Injury Risk Increased  Goal: Skin Health and Integrity  Outcome: Ongoing, Progressing  Intervention: Optimize Skin Protection  Recent Flowsheet Documentation  Taken 9/15/2021 1945 by Thorn, Erinne, RN  Pressure Reduction Techniques: frequent weight shift encouraged  Head of Bed (HOB): [facility] elevated  Pressure Reduction Devices: positioning supports utilized     Problem: Fall Injury Risk  Goal: Absence of Fall and Fall-Related Injury  Outcome: Ongoing, Progressing  Intervention: Identify and Manage Contributors to Fall Injury Risk  Recent Flowsheet Documentation  Taken 9/16/2021 0400 by Thorn, Erinne, RN  Medication Review/Management: medications reviewed  Taken 9/16/2021 0200 by Thorn, Erinne, RN  Medication Review/Management: medications reviewed  Taken 9/16/2021 0000 by Thorn, Erinne, RN  Medication Review/Management: medications reviewed  Taken 9/15/2021 2200 by Thorn, Erinne, RN  Medication Review/Management: medications reviewed  Taken 9/15/2021 2000 by Thorn, Erinne, RN  Medication Review/Management: medications reviewed  Taken 9/15/2021 1945 by Thorn, Erinne, RN  Self-Care Promotion: independence encouraged  Intervention: Promote Injury-Free Environment  Recent Flowsheet Documentation  Taken 9/16/2021 0400 by Thorn, Erinne, RN  Safety Promotion/Fall Prevention: safety round/check completed  Taken 9/16/2021 0200 by Thorn, Erinne, RN  Safety Promotion/Fall Prevention: safety round/check completed  Taken 9/16/2021 0000 by Thorn, Erinne, RN  Safety Promotion/Fall Prevention: safety round/check completed  Taken 9/15/2021 2200 by Thorn, Erinne, RN  Safety Promotion/Fall Prevention: safety round/check completed  Taken 9/15/2021 2000 by Thorn, Erinne, RN  Safety Promotion/Fall Prevention: safety round/check completed     Problem: Noninvasive Ventilation Acute  Goal: Effective  Unassisted Ventilation and Oxygenation  Outcome: Ongoing, Progressing  Intervention: Monitor and Manage Noninvasive Ventilation  Recent Flowsheet Documentation  Taken 9/15/2021 1945 by Thorn, Erinne, RN  Airway/Ventilation Management: airway patency maintained     Problem: Restraint, Nonbehavioral (Nonviolent)  Goal: Discontinuation Criteria Achieved  Outcome: Ongoing, Progressing  Intervention: Implement Least-restrictive Safety Strategies  Recent Flowsheet Documentation  Taken 9/15/2021 1945 by Thorn, Erinne, RN  Diversional Activities: television  Goal: Personal Dignity and Safety Maintained  Outcome: Ongoing, Progressing  Intervention: Protect Dignity, Rights, and Personal Wellbeing  Recent Flowsheet Documentation  Taken 9/15/2021 1945 by Thorn, Erinne, RN  Trust Relationship/Rapport: care explained  Intervention: Protect Skin and Joint Integrity  Recent Flowsheet Documentation  Taken 9/15/2021 1945 by Thorn, Erinne, RN  Body Position: position changed independently  Range of Motion:   active ROM (range of motion) encouraged   ROM (range of motion) performed   Goal Outcome Evaluation:  Plan of Care Reviewed With: patient        Progress: improving  Outcome Summary: Pt is on RA, mood is calmer then previous evening. No acute changes this shift. Will continue to monitor.

## 2021-09-17 ENCOUNTER — READMISSION MANAGEMENT (OUTPATIENT)
Dept: CALL CENTER | Facility: HOSPITAL | Age: 61
End: 2021-09-17

## 2021-09-17 NOTE — OUTREACH NOTE
Prep Survey      Responses   [facility]  [facility]   Is LACE score < 7 ?  No   Emergency Room discharge w/ pulse ox?  No   Eligibility  Readm Mgmt   Discharge diagnosis  resp failure, Covid PNA   Does the patient have one of the following disease processes/diagnoses(primary or secondary)?  COVID-19   Does the patient have Home health ordered?  No   Is there a DME ordered?  No   Prep survey completed?  Yes          Shania Pike RN

## 2021-09-17 NOTE — OUTREACH NOTE
COVID-19 Week 1 Survey      Responses   [facility] patient discharged from?  [facility]   Does the patient have one of the following disease processes/diagnoses(primary or secondary)?  COVID-19   COVID-19 underlying condition?  None   Call Number  Call 1   Week 1 Call successful?  Yes   Call start time  1058   Call end time  1101   Discharge diagnosis  resp failure, Covid PNA   Is patient permission given to speak with other caregiver?  Yes   List who call center can speak with  Carolee- SO   Person spoke with today (if not patient) and relationship  SO   Meds reviewed with patient/caregiver?  Yes   Is the patient having any side effects they believe may be caused by any medication additions or changes?  No   Does the patient have all medications ordered at discharge?  Yes   Is the patient taking all medications as directed (includes completed medication regime)?  Yes   Does the patient have a primary care provider?   Yes   Does the patient have an appointment with their PCP or specialist within 7 days of discharge?  No   What is preventing the patient from scheduling follow up appointments within 7 days of discharge?  Haven't had time   Nursing Interventions  Advised patient to make appointment   Has the patient kept scheduled appointments due by today?  N/A   Did the patient receive a copy of their discharge instructions?  Yes   Did the patient receive a copy of COVID-19 specific instructions?  Yes   Nursing interventions  Reviewed instructions with patient   What is the patient's perception of their health status since discharge?  Returned to baseline/stable   Does the patient have any of the following symptoms?  None   Nursing Interventions  Nurse provided patient education   Pulse Ox monitoring  Intermittent   Pulse Ox device source  Patient   O2 Sat comments  has not checked pulse ox since DC   O2 Sat: education provided  Sat levels, Monitoring frequency, When to seek care   Is the patient/caregiver able to  teach back steps to recovery at home?  Set small, achievable goals for return to baseline health, Rest and rebuild strength, gradually increase activity   If the patient is a current smoker, are they able to teach back resources for cessation?  [id number]   Is the patient/caregiver able to teach back the hierarchy of who to call/visit for symptoms/problems? PCP, Specialist, Home health nurse, Urgent Care, ED, 911  Yes   COVID-19 call completed?  Yes          Unique Bolden RN

## 2021-09-18 ENCOUNTER — READMISSION MANAGEMENT (OUTPATIENT)
Dept: CALL CENTER | Facility: HOSPITAL | Age: 61
End: 2021-09-18

## 2021-09-18 NOTE — OUTREACH NOTE
COVID-19 Week 1 Survey      Responses   [facility] patient discharged from?  [facility]   Does the patient have one of the following disease processes/diagnoses(primary or secondary)?  COVID-19   COVID-19 underlying condition?  None   Call Number  Call 2   Week 1 Call successful?  No   Discharge diagnosis  resp failure, Covid PNA          Bridget Yancey RN

## 2021-09-19 ENCOUNTER — READMISSION MANAGEMENT (OUTPATIENT)
Dept: CALL CENTER | Facility: HOSPITAL | Age: 61
End: 2021-09-19

## 2021-09-19 NOTE — OUTREACH NOTE
COVID-19 Week 1 Survey      Responses   [facility] patient discharged from?  [facility]   Does the patient have one of the following disease processes/diagnoses(primary or secondary)?  COVID-19   COVID-19 underlying condition?  None   Call Number  Call 3   Week 1 Call successful?  No [PATIENT'S NUMBER ANSWERED WITH A MACHINE STATING 'THIS IS A NON-WORKING NUMBER' DEMOGRAPHICS UPDATED.  ATTEMPTED TO CALL EMERGENCY CONTACT NUMBERS, BOTH ANSWERED WITH RECORDINGS, NO MESSAGES LEFT.]   Discharge diagnosis  resp failure, Covid PNA          Areli Aggarwal RN

## 2021-09-23 ENCOUNTER — READMISSION MANAGEMENT (OUTPATIENT)
Dept: CALL CENTER | Facility: HOSPITAL | Age: 61
End: 2021-09-23

## 2021-09-23 NOTE — OUTREACH NOTE
COVID-19 Week 2 Survey      Responses   [facility] [place] patient discharged from?  [facility]   Does the patient have one of the following disease processes/diagnoses(primary or secondary)?  COVID-19   COVID-19 underlying condition?  None   Call Number  Call 1   COVID-19 Week 2: Call 1 attempt successful?  Yes   Call start time  0907   Call end time  0913   Discharge diagnosis  resp failure, Covid PNA   Is patient permission given to speak with other caregiver?  Yes   List who call center can speak with  Carolee- SO   Person spoke with today (if not patient) and relationship  SO   Meds reviewed with patient/caregiver?  Yes   Does the patient have all medications ordered at discharge?  Yes   Is the patient taking all medications as directed (includes completed medication regime)?  Yes   Does the patient have a primary care provider?   Yes   [facility] Girlfriend states that they need to call and make appt.    Does the patient have an appointment with their PCP or specialist within 7 days of discharge?  No   Nursing Interventions  Educated patient on importance of making appointment, Advised patient to make appointment   Has the patient kept scheduled appointments due by today?  N/A   Has home health visited the patient within 72 hours of discharge?  N/A   Did the patient receive a copy of their discharge instructions?  Yes   Nursing interventions  Reviewed instructions with patient   What is the patient's perception of their health status since discharge?  Returned to baseline/stable [Girlfriend reports that patient is back to himself. ]   Does the patient have any of the following symptoms?  Cough   Nursing Interventions  Nurse provided patient education   Pulse Ox monitoring  Intermittent   Pulse Ox device source  Hospital   O2 Sat comments  98% on room air.    O2 Sat: education provided  Sat levels, Monitoring frequency, When to seek care   Is the patient/caregiver able to teach back steps to  recovery at home?  Set small, achievable goals for return to baseline health, Eat a well-balance diet   If the patient is a current smoker, are they able to teach back resources for cessation?  -- [Girlfriend reports that patient is not going to quit smoking. ]   Is the patient/caregiver able to teach back the hierarchy of who to call/visit for symptoms/problems? PCP, Specialist, Home health nurse, Urgent Care, ED, 911  Yes   COVID-19 call completed?  Yes          Catherine Gomez RN

## 2021-09-30 ENCOUNTER — READMISSION MANAGEMENT (OUTPATIENT)
Dept: CALL CENTER | Facility: HOSPITAL | Age: 61
End: 2021-09-30

## 2021-09-30 NOTE — OUTREACH NOTE
COVID-19 Week 3 Survey      Responses   [facility] patient discharged from?  [facility]   Does the patient have one of the following disease processes/diagnoses(primary or secondary)?  COVID-19   COVID-19 underlying condition?  None   Call Number  Call 1   COVID-19 Week 3: Call 1 attempt successful?  Yes   Call start time  1502   Call end time  1507   Discharge diagnosis  resp failure, Covid PNA   Is patient permission given to speak with other caregiver?  Yes   List who call center can speak with  Carolee- SO   Person spoke with today (if not patient) and relationship  SO   Is the patient taking all medications as directed (includes completed medication regime)?  Yes   Comments  have not made an appt for f/u--advised SO to do this   Psychosocial issues?  No   What is the patient's perception of their health status since discharge?  Returned to baseline/stable   Nursing Interventions  Nurse provided patient education   Pulse Ox monitoring  Intermittent   Pulse Ox device source  Hospital   O2 Sat comments  98% on room air.    O2 Sat: education provided  Sat levels, When to seek care   Is the patient/caregiver able to teach back steps to recovery at home?  Rest and rebuild strength, gradually increase activity   If the patient is a current smoker, are they able to teach back resources for cessation?  Smoking cessation medications [SO states he will not stop smoking]   Is the patient/caregiver able to teach back the hierarchy of who to call/visit for symptoms/problems? PCP, Specialist, Home health nurse, Urgent Care, ED, 911  Yes   COVID-19 call completed?  Yes   Revoked  No further contact(revokes)-requires comment   Is the patient interested in additional calls from an ambulatory ?  NOTE:  applies to high risk patients requiring additional follow-up.  No   Graduated/Revoked comments  Per SO, pt doing well and feeling well. Strongly enc return to PCP for f/u appt   Wrap up additional comments  SO states he  is doing better.          Ella Suresh RN